# Patient Record
Sex: FEMALE | Race: BLACK OR AFRICAN AMERICAN | Employment: OTHER | URBAN - METROPOLITAN AREA
[De-identification: names, ages, dates, MRNs, and addresses within clinical notes are randomized per-mention and may not be internally consistent; named-entity substitution may affect disease eponyms.]

---

## 2017-08-03 PROBLEM — R01.1 HEART MURMUR: Status: ACTIVE | Noted: 2017-08-03

## 2017-08-03 PROBLEM — E66.3 OVER WEIGHT: Status: ACTIVE | Noted: 2017-08-03

## 2018-06-08 ENCOUNTER — HOSPITAL ENCOUNTER (OUTPATIENT)
Age: 73
Discharge: HOME | End: 2018-06-08
Payer: MEDICARE

## 2018-06-08 LAB
ANION GAP SERPL CALCULATED.3IONS-SCNC: 11 MMOL/L (ref 7–16)
BASOPHILS ABSOLUTE: 0.03 E9/L (ref 0–0.2)
BASOPHILS RELATIVE PERCENT: 0.5 % (ref 0–2)
BUN BLDV-MCNC: 18 MG/DL (ref 8–23)
CALCIUM SERPL-MCNC: 9.3 MG/DL (ref 8.6–10.2)
CHLORIDE BLD-SCNC: 102 MMOL/L (ref 98–107)
CO2: 29 MMOL/L (ref 22–29)
CREAT SERPL-MCNC: 1.2 MG/DL (ref 0.5–1)
EOSINOPHILS ABSOLUTE: 0.1 E9/L (ref 0.05–0.5)
EOSINOPHILS RELATIVE PERCENT: 1.7 % (ref 0–6)
GFR AFRICAN AMERICAN: 53
GFR NON-AFRICAN AMERICAN: 53 ML/MIN/1.73
GLUCOSE BLD-MCNC: 98 MG/DL (ref 74–109)
HCT VFR BLD CALC: 36.3 % (ref 34–48)
HEMOGLOBIN: 11.9 G/DL (ref 11.5–15.5)
IMMATURE GRANULOCYTES #: 0.02 E9/L
IMMATURE GRANULOCYTES %: 0.3 % (ref 0–5)
LYMPHOCYTES ABSOLUTE: 3.01 E9/L (ref 1.5–4)
LYMPHOCYTES RELATIVE PERCENT: 52.3 % (ref 20–42)
MCH RBC QN AUTO: 26.4 PG (ref 26–35)
MCHC RBC AUTO-ENTMCNC: 32.8 % (ref 32–34.5)
MCV RBC AUTO: 80.7 FL (ref 80–99.9)
MONOCYTES ABSOLUTE: 0.39 E9/L (ref 0.1–0.95)
MONOCYTES RELATIVE PERCENT: 6.8 % (ref 2–12)
NEUTROPHILS ABSOLUTE: 2.2 E9/L (ref 1.8–7.3)
NEUTROPHILS RELATIVE PERCENT: 38.4 % (ref 43–80)
PDW BLD-RTO: 13.5 FL (ref 11.5–15)
PHOSPHORUS: 3.4 MG/DL (ref 2.5–4.5)
PLATELET # BLD: 164 E9/L (ref 130–450)
PMV BLD AUTO: 9.6 FL (ref 7–12)
POTASSIUM SERPL-SCNC: 3.8 MMOL/L (ref 3.5–5)
RBC # BLD: 4.5 E12/L (ref 3.5–5.5)
SODIUM BLD-SCNC: 142 MMOL/L (ref 132–146)
WBC # BLD: 5.8 E9/L (ref 4.5–11.5)

## 2018-06-08 PROCEDURE — 84100 ASSAY OF PHOSPHORUS: CPT

## 2018-06-08 PROCEDURE — 36415 COLL VENOUS BLD VENIPUNCTURE: CPT

## 2018-06-08 PROCEDURE — 85025 COMPLETE CBC W/AUTO DIFF WBC: CPT

## 2018-06-08 PROCEDURE — 80048 BASIC METABOLIC PNL TOTAL CA: CPT

## 2018-06-08 PROCEDURE — 82306 VITAMIN D 25 HYDROXY: CPT

## 2018-06-08 PROCEDURE — 83970 ASSAY OF PARATHORMONE: CPT

## 2018-06-09 LAB
PARATHYROID HORMONE INTACT: 50 PG/ML (ref 15–65)
VITAMIN D 25-HYDROXY: 76 NG/ML (ref 30–100)

## 2018-11-07 ENCOUNTER — APPOINTMENT (OUTPATIENT)
Dept: GENERAL RADIOLOGY | Age: 73
End: 2018-11-07
Payer: MEDICARE

## 2018-11-07 ENCOUNTER — HOSPITAL ENCOUNTER (EMERGENCY)
Age: 73
Discharge: HOME | End: 2018-11-08
Attending: EMERGENCY MEDICINE
Payer: MEDICARE

## 2018-11-07 VITALS
RESPIRATION RATE: 16 BRPM | TEMPERATURE: 98.9 F | HEART RATE: 74 BPM | DIASTOLIC BLOOD PRESSURE: 95 MMHG | HEIGHT: 65 IN | BODY MASS INDEX: 27.66 KG/M2 | OXYGEN SATURATION: 99 % | WEIGHT: 166 LBS | SYSTOLIC BLOOD PRESSURE: 178 MMHG

## 2018-11-07 DIAGNOSIS — M87.052 AVASCULAR NECROSIS OF BONE OF LEFT HIP (HCC): ICD-10-CM

## 2018-11-07 DIAGNOSIS — M87.052 AVASCULAR NECROSIS OF BONE OF HIP, LEFT (HCC): ICD-10-CM

## 2018-11-07 DIAGNOSIS — S82.142A CLOSED FRACTURE OF LEFT TIBIAL PLATEAU, INITIAL ENCOUNTER: Primary | ICD-10-CM

## 2018-11-07 PROCEDURE — 73562 X-RAY EXAM OF KNEE 3: CPT

## 2018-11-07 PROCEDURE — 72170 X-RAY EXAM OF PELVIS: CPT

## 2018-11-07 PROCEDURE — 71045 X-RAY EXAM CHEST 1 VIEW: CPT

## 2018-11-07 PROCEDURE — 99284 EMERGENCY DEPT VISIT MOD MDM: CPT

## 2018-11-07 PROCEDURE — 73030 X-RAY EXAM OF SHOULDER: CPT

## 2018-11-07 PROCEDURE — 6360000002 HC RX W HCPCS: Performed by: EMERGENCY MEDICINE

## 2018-11-07 PROCEDURE — 6370000000 HC RX 637 (ALT 250 FOR IP): Performed by: EMERGENCY MEDICINE

## 2018-11-07 RX ORDER — OXYCODONE HYDROCHLORIDE AND ACETAMINOPHEN 5; 325 MG/1; MG/1
2 TABLET ORAL ONCE
Status: COMPLETED | OUTPATIENT
Start: 2018-11-07 | End: 2018-11-07

## 2018-11-07 RX ORDER — TRAMADOL HYDROCHLORIDE 50 MG/1
50 TABLET ORAL ONCE
Status: COMPLETED | OUTPATIENT
Start: 2018-11-07 | End: 2018-11-07

## 2018-11-07 RX ORDER — ONDANSETRON 4 MG/1
8 TABLET, ORALLY DISINTEGRATING ORAL ONCE
Status: COMPLETED | OUTPATIENT
Start: 2018-11-07 | End: 2018-11-07

## 2018-11-07 RX ADMIN — TRAMADOL HYDROCHLORIDE 50 MG: 50 TABLET, FILM COATED ORAL at 20:24

## 2018-11-07 RX ADMIN — OXYCODONE AND ACETAMINOPHEN 2 TABLET: 5; 325 TABLET ORAL at 23:37

## 2018-11-07 RX ADMIN — ONDANSETRON 8 MG: 4 TABLET, ORALLY DISINTEGRATING ORAL at 20:24

## 2018-11-07 ASSESSMENT — PAIN DESCRIPTION - LOCATION: LOCATION: BACK;SHOULDER

## 2018-11-07 ASSESSMENT — PAIN DESCRIPTION - PAIN TYPE: TYPE: ACUTE PAIN

## 2018-11-07 ASSESSMENT — PAIN SCALES - GENERAL
PAINLEVEL_OUTOF10: 5

## 2018-11-07 ASSESSMENT — PAIN DESCRIPTION - ORIENTATION: ORIENTATION: RIGHT

## 2018-11-08 ASSESSMENT — PAIN DESCRIPTION - PAIN TYPE: TYPE: ACUTE PAIN

## 2018-11-08 ASSESSMENT — PAIN SCALES - GENERAL: PAINLEVEL_OUTOF10: 3

## 2018-11-08 ASSESSMENT — PAIN DESCRIPTION - ORIENTATION: ORIENTATION: LEFT

## 2018-11-08 ASSESSMENT — PAIN DESCRIPTION - LOCATION: LOCATION: FACE
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
## 2019-02-27 ENCOUNTER — HOSPITAL ENCOUNTER (OUTPATIENT)
Age: 74
Discharge: HOME | End: 2019-02-27
Payer: MEDICARE

## 2019-02-27 LAB
ALBUMIN SERPL-MCNC: 4.1 G/DL (ref 3.5–5.2)
ALP BLD-CCNC: 83 U/L (ref 35–104)
ALT SERPL-CCNC: 13 U/L (ref 0–32)
ANION GAP SERPL CALCULATED.3IONS-SCNC: 10 MMOL/L (ref 7–16)
AST SERPL-CCNC: 15 U/L (ref 0–31)
BASOPHILS ABSOLUTE: 0.03 E9/L (ref 0–0.2)
BASOPHILS RELATIVE PERCENT: 0.6 % (ref 0–2)
BILIRUB SERPL-MCNC: 0.4 MG/DL (ref 0–1.2)
BUN BLDV-MCNC: 18 MG/DL (ref 8–23)
CALCIUM SERPL-MCNC: 9.7 MG/DL (ref 8.6–10.2)
CHLORIDE BLD-SCNC: 102 MMOL/L (ref 98–107)
CO2: 30 MMOL/L (ref 22–29)
CREAT SERPL-MCNC: 1.4 MG/DL (ref 0.5–1)
EOSINOPHILS ABSOLUTE: 0.1 E9/L (ref 0.05–0.5)
EOSINOPHILS RELATIVE PERCENT: 1.9 % (ref 0–6)
GFR AFRICAN AMERICAN: 45
GFR NON-AFRICAN AMERICAN: 45 ML/MIN/1.73
GLUCOSE BLD-MCNC: 121 MG/DL (ref 74–99)
HCT VFR BLD CALC: 38.2 % (ref 34–48)
HEMOGLOBIN: 12.1 G/DL (ref 11.5–15.5)
IMMATURE GRANULOCYTES #: 0.01 E9/L
IMMATURE GRANULOCYTES %: 0.2 % (ref 0–5)
LYMPHOCYTES ABSOLUTE: 2.85 E9/L (ref 1.5–4)
LYMPHOCYTES RELATIVE PERCENT: 54.7 % (ref 20–42)
MAGNESIUM: 2 MG/DL (ref 1.6–2.6)
MCH RBC QN AUTO: 26.2 PG (ref 26–35)
MCHC RBC AUTO-ENTMCNC: 31.7 % (ref 32–34.5)
MCV RBC AUTO: 82.9 FL (ref 80–99.9)
MONOCYTES ABSOLUTE: 0.31 E9/L (ref 0.1–0.95)
MONOCYTES RELATIVE PERCENT: 6 % (ref 2–12)
NEUTROPHILS ABSOLUTE: 1.91 E9/L (ref 1.8–7.3)
NEUTROPHILS RELATIVE PERCENT: 36.6 % (ref 43–80)
PARATHYROID HORMONE INTACT: 43 PG/ML (ref 15–65)
PDW BLD-RTO: 13.4 FL (ref 11.5–15)
PHOSPHORUS: 4.3 MG/DL (ref 2.5–4.5)
PLATELET # BLD: 168 E9/L (ref 130–450)
PMV BLD AUTO: 9.2 FL (ref 7–12)
POTASSIUM SERPL-SCNC: 3.9 MMOL/L (ref 3.5–5)
RBC # BLD: 4.61 E12/L (ref 3.5–5.5)
SODIUM BLD-SCNC: 142 MMOL/L (ref 132–146)
TOTAL PROTEIN: 6.8 G/DL (ref 6.4–8.3)
VITAMIN D 25-HYDROXY: 80 NG/ML (ref 30–100)
WBC # BLD: 5.2 E9/L (ref 4.5–11.5)

## 2019-02-27 PROCEDURE — 36415 COLL VENOUS BLD VENIPUNCTURE: CPT

## 2019-02-27 PROCEDURE — 83970 ASSAY OF PARATHORMONE: CPT

## 2019-02-27 PROCEDURE — 80053 COMPREHEN METABOLIC PANEL: CPT

## 2019-02-27 PROCEDURE — 83735 ASSAY OF MAGNESIUM: CPT

## 2019-02-27 PROCEDURE — 84100 ASSAY OF PHOSPHORUS: CPT

## 2019-02-27 PROCEDURE — 82306 VITAMIN D 25 HYDROXY: CPT

## 2019-02-27 PROCEDURE — 85025 COMPLETE CBC W/AUTO DIFF WBC: CPT

## 2019-04-03 ENCOUNTER — HOSPITAL ENCOUNTER (OUTPATIENT)
Age: 74
Discharge: HOME | End: 2019-04-03
Payer: MEDICARE

## 2019-04-03 LAB
ANION GAP SERPL CALCULATED.3IONS-SCNC: 13 MMOL/L (ref 7–16)
BASOPHILS ABSOLUTE: 0.02 E9/L (ref 0–0.2)
BASOPHILS RELATIVE PERCENT: 0.4 % (ref 0–2)
BUN BLDV-MCNC: 33 MG/DL (ref 8–23)
CALCIUM SERPL-MCNC: 9.1 MG/DL (ref 8.6–10.2)
CHLORIDE BLD-SCNC: 103 MMOL/L (ref 98–107)
CO2: 28 MMOL/L (ref 22–29)
CREAT SERPL-MCNC: 1.7 MG/DL (ref 0.5–1)
EOSINOPHILS ABSOLUTE: 0.11 E9/L (ref 0.05–0.5)
EOSINOPHILS RELATIVE PERCENT: 2.1 % (ref 0–6)
GFR AFRICAN AMERICAN: 36
GFR NON-AFRICAN AMERICAN: 36 ML/MIN/1.73
GLUCOSE BLD-MCNC: 109 MG/DL (ref 74–99)
HCT VFR BLD CALC: 36 % (ref 34–48)
HEMOGLOBIN: 11.3 G/DL (ref 11.5–15.5)
IMMATURE GRANULOCYTES #: 0.01 E9/L
IMMATURE GRANULOCYTES %: 0.2 % (ref 0–5)
LYMPHOCYTES ABSOLUTE: 2.41 E9/L (ref 1.5–4)
LYMPHOCYTES RELATIVE PERCENT: 46.9 % (ref 20–42)
MCH RBC QN AUTO: 26 PG (ref 26–35)
MCHC RBC AUTO-ENTMCNC: 31.4 % (ref 32–34.5)
MCV RBC AUTO: 82.8 FL (ref 80–99.9)
MONOCYTES ABSOLUTE: 0.37 E9/L (ref 0.1–0.95)
MONOCYTES RELATIVE PERCENT: 7.2 % (ref 2–12)
NEUTROPHILS ABSOLUTE: 2.22 E9/L (ref 1.8–7.3)
NEUTROPHILS RELATIVE PERCENT: 43.2 % (ref 43–80)
PDW BLD-RTO: 13.5 FL (ref 11.5–15)
PHOSPHORUS: 3.9 MG/DL (ref 2.5–4.5)
PLATELET # BLD: 163 E9/L (ref 130–450)
PMV BLD AUTO: 9.6 FL (ref 7–12)
POTASSIUM SERPL-SCNC: 3.9 MMOL/L (ref 3.5–5)
RBC # BLD: 4.35 E12/L (ref 3.5–5.5)
SODIUM BLD-SCNC: 144 MMOL/L (ref 132–146)
WBC # BLD: 5.1 E9/L (ref 4.5–11.5)

## 2019-04-03 PROCEDURE — 84100 ASSAY OF PHOSPHORUS: CPT

## 2019-04-03 PROCEDURE — 85025 COMPLETE CBC W/AUTO DIFF WBC: CPT

## 2019-04-03 PROCEDURE — 80048 BASIC METABOLIC PNL TOTAL CA: CPT

## 2019-04-03 PROCEDURE — 36415 COLL VENOUS BLD VENIPUNCTURE: CPT

## 2019-05-02 ENCOUNTER — HOSPITAL ENCOUNTER (OUTPATIENT)
Age: 74
Discharge: HOME | End: 2019-05-02
Payer: MEDICARE

## 2019-05-02 LAB
ANION GAP SERPL CALCULATED.3IONS-SCNC: 12 MMOL/L (ref 7–16)
BASOPHILS ABSOLUTE: 0.02 E9/L (ref 0–0.2)
BASOPHILS RELATIVE PERCENT: 0.4 % (ref 0–2)
BUN BLDV-MCNC: 19 MG/DL (ref 8–23)
CALCIUM SERPL-MCNC: 9.3 MG/DL (ref 8.6–10.2)
CHLORIDE BLD-SCNC: 105 MMOL/L (ref 98–107)
CO2: 29 MMOL/L (ref 22–29)
CREAT SERPL-MCNC: 1.5 MG/DL (ref 0.5–1)
EOSINOPHILS ABSOLUTE: 0.14 E9/L (ref 0.05–0.5)
EOSINOPHILS RELATIVE PERCENT: 2.6 % (ref 0–6)
GFR AFRICAN AMERICAN: 41
GFR NON-AFRICAN AMERICAN: 41 ML/MIN/1.73
GLUCOSE BLD-MCNC: 109 MG/DL (ref 74–99)
HCT VFR BLD CALC: 35.9 % (ref 34–48)
HEMOGLOBIN: 11.4 G/DL (ref 11.5–15.5)
IMMATURE GRANULOCYTES #: 0.01 E9/L
IMMATURE GRANULOCYTES %: 0.2 % (ref 0–5)
LYMPHOCYTES ABSOLUTE: 2.22 E9/L (ref 1.5–4)
LYMPHOCYTES RELATIVE PERCENT: 41 % (ref 20–42)
MCH RBC QN AUTO: 26.4 PG (ref 26–35)
MCHC RBC AUTO-ENTMCNC: 31.8 % (ref 32–34.5)
MCV RBC AUTO: 83.1 FL (ref 80–99.9)
MONOCYTES ABSOLUTE: 0.44 E9/L (ref 0.1–0.95)
MONOCYTES RELATIVE PERCENT: 8.1 % (ref 2–12)
NEUTROPHILS ABSOLUTE: 2.59 E9/L (ref 1.8–7.3)
NEUTROPHILS RELATIVE PERCENT: 47.7 % (ref 43–80)
PDW BLD-RTO: 13.9 FL (ref 11.5–15)
PHOSPHORUS: 3.2 MG/DL (ref 2.5–4.5)
PLATELET # BLD: 153 E9/L (ref 130–450)
PMV BLD AUTO: 9.7 FL (ref 7–12)
POTASSIUM SERPL-SCNC: 4.1 MMOL/L (ref 3.5–5)
RBC # BLD: 4.32 E12/L (ref 3.5–5.5)
SODIUM BLD-SCNC: 146 MMOL/L (ref 132–146)
WBC # BLD: 5.4 E9/L (ref 4.5–11.5)

## 2019-05-02 PROCEDURE — 80048 BASIC METABOLIC PNL TOTAL CA: CPT

## 2019-05-02 PROCEDURE — 85025 COMPLETE CBC W/AUTO DIFF WBC: CPT

## 2019-05-02 PROCEDURE — 84100 ASSAY OF PHOSPHORUS: CPT

## 2019-05-02 PROCEDURE — 36415 COLL VENOUS BLD VENIPUNCTURE: CPT

## 2019-06-10 ENCOUNTER — HOSPITAL ENCOUNTER (OUTPATIENT)
Age: 74
Discharge: HOME | End: 2019-06-10
Payer: MEDICARE

## 2019-06-10 LAB
ANION GAP SERPL CALCULATED.3IONS-SCNC: 10 MMOL/L (ref 7–16)
BASOPHILS ABSOLUTE: 0.03 E9/L (ref 0–0.2)
BASOPHILS RELATIVE PERCENT: 0.6 % (ref 0–2)
BUN BLDV-MCNC: 28 MG/DL (ref 8–23)
CALCIUM SERPL-MCNC: 9 MG/DL (ref 8.6–10.2)
CHLORIDE BLD-SCNC: 104 MMOL/L (ref 98–107)
CO2: 29 MMOL/L (ref 22–29)
CREAT SERPL-MCNC: 1.3 MG/DL (ref 0.5–1)
EOSINOPHILS ABSOLUTE: 0.13 E9/L (ref 0.05–0.5)
EOSINOPHILS RELATIVE PERCENT: 2.8 % (ref 0–6)
GFR AFRICAN AMERICAN: 49
GFR NON-AFRICAN AMERICAN: 49 ML/MIN/1.73
GLUCOSE BLD-MCNC: 139 MG/DL (ref 74–99)
HCT VFR BLD CALC: 35.8 % (ref 34–48)
HEMOGLOBIN: 11.4 G/DL (ref 11.5–15.5)
IMMATURE GRANULOCYTES #: 0.01 E9/L
IMMATURE GRANULOCYTES %: 0.2 % (ref 0–5)
LYMPHOCYTES ABSOLUTE: 2.33 E9/L (ref 1.5–4)
LYMPHOCYTES RELATIVE PERCENT: 49.5 % (ref 20–42)
MCH RBC QN AUTO: 26.2 PG (ref 26–35)
MCHC RBC AUTO-ENTMCNC: 31.8 % (ref 32–34.5)
MCV RBC AUTO: 82.3 FL (ref 80–99.9)
MONOCYTES ABSOLUTE: 0.35 E9/L (ref 0.1–0.95)
MONOCYTES RELATIVE PERCENT: 7.4 % (ref 2–12)
NEUTROPHILS ABSOLUTE: 1.86 E9/L (ref 1.8–7.3)
NEUTROPHILS RELATIVE PERCENT: 39.5 % (ref 43–80)
PDW BLD-RTO: 14.2 FL (ref 11.5–15)
PHOSPHORUS: 3 MG/DL (ref 2.5–4.5)
PLATELET # BLD: 157 E9/L (ref 130–450)
PMV BLD AUTO: 9.6 FL (ref 7–12)
POTASSIUM SERPL-SCNC: 3.9 MMOL/L (ref 3.5–5)
RBC # BLD: 4.35 E12/L (ref 3.5–5.5)
SODIUM BLD-SCNC: 143 MMOL/L (ref 132–146)
WBC # BLD: 4.7 E9/L (ref 4.5–11.5)

## 2019-06-10 PROCEDURE — 85025 COMPLETE CBC W/AUTO DIFF WBC: CPT

## 2019-06-10 PROCEDURE — 80048 BASIC METABOLIC PNL TOTAL CA: CPT

## 2019-06-10 PROCEDURE — 36415 COLL VENOUS BLD VENIPUNCTURE: CPT

## 2019-06-10 PROCEDURE — 84100 ASSAY OF PHOSPHORUS: CPT

## 2019-07-08 ENCOUNTER — HOSPITAL ENCOUNTER (OUTPATIENT)
Age: 74
Discharge: HOME | End: 2019-07-08
Payer: MEDICARE

## 2019-07-08 LAB
ALBUMIN SERPL-MCNC: 4.3 G/DL (ref 3.5–5.2)
ALP BLD-CCNC: 85 U/L (ref 35–104)
ALT SERPL-CCNC: 12 U/L (ref 0–32)
ANION GAP SERPL CALCULATED.3IONS-SCNC: 11 MMOL/L (ref 7–16)
AST SERPL-CCNC: 15 U/L (ref 0–31)
BASOPHILS ABSOLUTE: 0.02 E9/L (ref 0–0.2)
BASOPHILS RELATIVE PERCENT: 0.4 % (ref 0–2)
BILIRUB SERPL-MCNC: 0.3 MG/DL (ref 0–1.2)
BUN BLDV-MCNC: 16 MG/DL (ref 8–23)
CALCIUM SERPL-MCNC: 9.2 MG/DL (ref 8.6–10.2)
CHLORIDE BLD-SCNC: 103 MMOL/L (ref 98–107)
CHOLESTEROL, TOTAL: 192 MG/DL (ref 0–199)
CO2: 30 MMOL/L (ref 22–29)
CREAT SERPL-MCNC: 1.4 MG/DL (ref 0.5–1)
EOSINOPHILS ABSOLUTE: 0.16 E9/L (ref 0.05–0.5)
EOSINOPHILS RELATIVE PERCENT: 3.3 % (ref 0–6)
GFR AFRICAN AMERICAN: 45
GFR NON-AFRICAN AMERICAN: 45 ML/MIN/1.73
GLUCOSE BLD-MCNC: 130 MG/DL (ref 74–99)
HCT VFR BLD CALC: 36.8 % (ref 34–48)
HDLC SERPL-MCNC: 34 MG/DL
HEMOGLOBIN: 11.7 G/DL (ref 11.5–15.5)
IMMATURE GRANULOCYTES #: 0.02 E9/L
IMMATURE GRANULOCYTES %: 0.4 % (ref 0–5)
LDL CHOLESTEROL CALCULATED: 124 MG/DL (ref 0–99)
LYMPHOCYTES ABSOLUTE: 1.8 E9/L (ref 1.5–4)
LYMPHOCYTES RELATIVE PERCENT: 37 % (ref 20–42)
MCH RBC QN AUTO: 26.5 PG (ref 26–35)
MCHC RBC AUTO-ENTMCNC: 31.8 % (ref 32–34.5)
MCV RBC AUTO: 83.3 FL (ref 80–99.9)
MONOCYTES ABSOLUTE: 0.34 E9/L (ref 0.1–0.95)
MONOCYTES RELATIVE PERCENT: 7 % (ref 2–12)
NEUTROPHILS ABSOLUTE: 2.53 E9/L (ref 1.8–7.3)
NEUTROPHILS RELATIVE PERCENT: 51.9 % (ref 43–80)
PDW BLD-RTO: 13.5 FL (ref 11.5–15)
PHOSPHORUS: 3.3 MG/DL (ref 2.5–4.5)
PLATELET # BLD: 157 E9/L (ref 130–450)
PMV BLD AUTO: 9.2 FL (ref 7–12)
POTASSIUM SERPL-SCNC: 4.1 MMOL/L (ref 3.5–5)
RBC # BLD: 4.42 E12/L (ref 3.5–5.5)
SODIUM BLD-SCNC: 144 MMOL/L (ref 132–146)
TOTAL PROTEIN: 6.9 G/DL (ref 6.4–8.3)
TRIGL SERPL-MCNC: 168 MG/DL (ref 0–149)
VLDLC SERPL CALC-MCNC: 34 MG/DL
WBC # BLD: 4.9 E9/L (ref 4.5–11.5)

## 2019-07-08 PROCEDURE — 85025 COMPLETE CBC W/AUTO DIFF WBC: CPT

## 2019-07-08 PROCEDURE — 36415 COLL VENOUS BLD VENIPUNCTURE: CPT

## 2019-07-08 PROCEDURE — 84100 ASSAY OF PHOSPHORUS: CPT

## 2019-07-08 PROCEDURE — 80061 LIPID PANEL: CPT

## 2019-07-08 PROCEDURE — 80053 COMPREHEN METABOLIC PANEL: CPT

## 2019-09-05 ENCOUNTER — HOSPITAL ENCOUNTER (OUTPATIENT)
Age: 74
Discharge: HOME | End: 2019-09-05
Payer: MEDICARE

## 2019-09-05 LAB
ANION GAP SERPL CALCULATED.3IONS-SCNC: 12 MMOL/L (ref 7–16)
BASOPHILS ABSOLUTE: 0.03 E9/L (ref 0–0.2)
BASOPHILS RELATIVE PERCENT: 0.5 % (ref 0–2)
BUN BLDV-MCNC: 25 MG/DL (ref 8–23)
CALCIUM SERPL-MCNC: 9.8 MG/DL (ref 8.6–10.2)
CHLORIDE BLD-SCNC: 102 MMOL/L (ref 98–107)
CO2: 30 MMOL/L (ref 22–29)
CREAT SERPL-MCNC: 1.5 MG/DL (ref 0.5–1)
EOSINOPHILS ABSOLUTE: 0.15 E9/L (ref 0.05–0.5)
EOSINOPHILS RELATIVE PERCENT: 2.6 % (ref 0–6)
GFR AFRICAN AMERICAN: 41
GFR NON-AFRICAN AMERICAN: 41 ML/MIN/1.73
GLUCOSE BLD-MCNC: 117 MG/DL (ref 74–99)
HCT VFR BLD CALC: 40.1 % (ref 34–48)
HEMOGLOBIN: 12.7 G/DL (ref 11.5–15.5)
IMMATURE GRANULOCYTES #: 0.01 E9/L
IMMATURE GRANULOCYTES %: 0.2 % (ref 0–5)
LYMPHOCYTES ABSOLUTE: 2.69 E9/L (ref 1.5–4)
LYMPHOCYTES RELATIVE PERCENT: 47.3 % (ref 20–42)
MCH RBC QN AUTO: 26.1 PG (ref 26–35)
MCHC RBC AUTO-ENTMCNC: 31.7 % (ref 32–34.5)
MCV RBC AUTO: 82.5 FL (ref 80–99.9)
MONOCYTES ABSOLUTE: 0.39 E9/L (ref 0.1–0.95)
MONOCYTES RELATIVE PERCENT: 6.9 % (ref 2–12)
NEUTROPHILS ABSOLUTE: 2.42 E9/L (ref 1.8–7.3)
NEUTROPHILS RELATIVE PERCENT: 42.5 % (ref 43–80)
PDW BLD-RTO: 13.3 FL (ref 11.5–15)
PHOSPHORUS: 3.3 MG/DL (ref 2.5–4.5)
PLATELET # BLD: 164 E9/L (ref 130–450)
PMV BLD AUTO: 9.3 FL (ref 7–12)
POTASSIUM SERPL-SCNC: 3.7 MMOL/L (ref 3.5–5)
RBC # BLD: 4.86 E12/L (ref 3.5–5.5)
SODIUM BLD-SCNC: 144 MMOL/L (ref 132–146)
WBC # BLD: 5.7 E9/L (ref 4.5–11.5)

## 2019-09-05 PROCEDURE — 80048 BASIC METABOLIC PNL TOTAL CA: CPT

## 2019-09-05 PROCEDURE — 84100 ASSAY OF PHOSPHORUS: CPT

## 2019-09-05 PROCEDURE — 36415 COLL VENOUS BLD VENIPUNCTURE: CPT

## 2019-09-05 PROCEDURE — 85025 COMPLETE CBC W/AUTO DIFF WBC: CPT

## 2019-11-15 ENCOUNTER — HOSPITAL ENCOUNTER (OUTPATIENT)
Age: 74
Discharge: HOME | End: 2019-11-15
Payer: MEDICARE

## 2019-11-15 LAB
ANION GAP SERPL CALCULATED.3IONS-SCNC: 8 MMOL/L (ref 7–16)
BASOPHILS ABSOLUTE: 0.02 E9/L (ref 0–0.2)
BASOPHILS RELATIVE PERCENT: 0.3 % (ref 0–2)
BUN BLDV-MCNC: 20 MG/DL (ref 8–23)
CALCIUM SERPL-MCNC: 9.2 MG/DL (ref 8.6–10.2)
CHLORIDE BLD-SCNC: 102 MMOL/L (ref 98–107)
CO2: 31 MMOL/L (ref 22–29)
CREAT SERPL-MCNC: 1.4 MG/DL (ref 0.5–1)
EOSINOPHILS ABSOLUTE: 0.16 E9/L (ref 0.05–0.5)
EOSINOPHILS RELATIVE PERCENT: 2.5 % (ref 0–6)
GFR AFRICAN AMERICAN: 44
GFR NON-AFRICAN AMERICAN: 44 ML/MIN/1.73
GLUCOSE BLD-MCNC: 106 MG/DL (ref 74–99)
HCT VFR BLD CALC: 38.6 % (ref 34–48)
HEMOGLOBIN: 12.2 G/DL (ref 11.5–15.5)
IMMATURE GRANULOCYTES #: 0.02 E9/L
IMMATURE GRANULOCYTES %: 0.3 % (ref 0–5)
LYMPHOCYTES ABSOLUTE: 2.83 E9/L (ref 1.5–4)
LYMPHOCYTES RELATIVE PERCENT: 45.1 % (ref 20–42)
MCH RBC QN AUTO: 26.2 PG (ref 26–35)
MCHC RBC AUTO-ENTMCNC: 31.6 % (ref 32–34.5)
MCV RBC AUTO: 82.8 FL (ref 80–99.9)
MONOCYTES ABSOLUTE: 0.42 E9/L (ref 0.1–0.95)
MONOCYTES RELATIVE PERCENT: 6.7 % (ref 2–12)
NEUTROPHILS ABSOLUTE: 2.83 E9/L (ref 1.8–7.3)
NEUTROPHILS RELATIVE PERCENT: 45.1 % (ref 43–80)
PDW BLD-RTO: 14.8 FL (ref 11.5–15)
PHOSPHORUS: 3 MG/DL (ref 2.5–4.5)
PLATELET # BLD: 187 E9/L (ref 130–450)
PMV BLD AUTO: 9.5 FL (ref 7–12)
POTASSIUM SERPL-SCNC: 4.4 MMOL/L (ref 3.5–5)
RBC # BLD: 4.66 E12/L (ref 3.5–5.5)
SODIUM BLD-SCNC: 141 MMOL/L (ref 132–146)
WBC # BLD: 6.3 E9/L (ref 4.5–11.5)

## 2019-11-15 PROCEDURE — 36415 COLL VENOUS BLD VENIPUNCTURE: CPT

## 2019-11-15 PROCEDURE — 80048 BASIC METABOLIC PNL TOTAL CA: CPT

## 2019-11-15 PROCEDURE — 84100 ASSAY OF PHOSPHORUS: CPT

## 2019-11-15 PROCEDURE — 85025 COMPLETE CBC W/AUTO DIFF WBC: CPT

## 2020-01-30 ENCOUNTER — HOSPITAL ENCOUNTER (OUTPATIENT)
Age: 75
Discharge: HOME | End: 2020-01-30
Payer: MEDICARE

## 2020-01-30 LAB
ANION GAP SERPL CALCULATED.3IONS-SCNC: 12 MMOL/L (ref 7–16)
BASOPHILS ABSOLUTE: 0.03 E9/L (ref 0–0.2)
BASOPHILS RELATIVE PERCENT: 0.6 % (ref 0–2)
BUN BLDV-MCNC: 23 MG/DL (ref 8–23)
CALCIUM SERPL-MCNC: 9.5 MG/DL (ref 8.6–10.2)
CHLORIDE BLD-SCNC: 104 MMOL/L (ref 98–107)
CO2: 29 MMOL/L (ref 22–29)
CREAT SERPL-MCNC: 1.4 MG/DL (ref 0.5–1)
EOSINOPHILS ABSOLUTE: 0.13 E9/L (ref 0.05–0.5)
EOSINOPHILS RELATIVE PERCENT: 2.7 % (ref 0–6)
GFR AFRICAN AMERICAN: 44
GFR NON-AFRICAN AMERICAN: 44 ML/MIN/1.73
GLUCOSE BLD-MCNC: 123 MG/DL (ref 74–99)
HCT VFR BLD CALC: 38.7 % (ref 34–48)
HEMOGLOBIN: 12.4 G/DL (ref 11.5–15.5)
IMMATURE GRANULOCYTES #: 0.01 E9/L
IMMATURE GRANULOCYTES %: 0.2 % (ref 0–5)
LYMPHOCYTES ABSOLUTE: 2.48 E9/L (ref 1.5–4)
LYMPHOCYTES RELATIVE PERCENT: 51.5 % (ref 20–42)
MCH RBC QN AUTO: 26.6 PG (ref 26–35)
MCHC RBC AUTO-ENTMCNC: 32 % (ref 32–34.5)
MCV RBC AUTO: 82.9 FL (ref 80–99.9)
MONOCYTES ABSOLUTE: 0.37 E9/L (ref 0.1–0.95)
MONOCYTES RELATIVE PERCENT: 7.7 % (ref 2–12)
NEUTROPHILS ABSOLUTE: 1.8 E9/L (ref 1.8–7.3)
NEUTROPHILS RELATIVE PERCENT: 37.3 % (ref 43–80)
PDW BLD-RTO: 13.7 FL (ref 11.5–15)
PHOSPHORUS: 2.7 MG/DL (ref 2.5–4.5)
PLATELET # BLD: 166 E9/L (ref 130–450)
PMV BLD AUTO: 9.4 FL (ref 7–12)
POTASSIUM SERPL-SCNC: 3.7 MMOL/L (ref 3.5–5)
RBC # BLD: 4.67 E12/L (ref 3.5–5.5)
SODIUM BLD-SCNC: 145 MMOL/L (ref 132–146)
WBC # BLD: 4.8 E9/L (ref 4.5–11.5)

## 2020-01-30 PROCEDURE — 84100 ASSAY OF PHOSPHORUS: CPT

## 2020-01-30 PROCEDURE — 36415 COLL VENOUS BLD VENIPUNCTURE: CPT

## 2020-01-30 PROCEDURE — 80048 BASIC METABOLIC PNL TOTAL CA: CPT

## 2020-01-30 PROCEDURE — 85025 COMPLETE CBC W/AUTO DIFF WBC: CPT

## 2020-06-09 ENCOUNTER — HOSPITAL ENCOUNTER (OUTPATIENT)
Age: 75
Discharge: HOME | End: 2020-06-09
Payer: MEDICARE

## 2020-06-09 LAB
ANION GAP SERPL CALCULATED.3IONS-SCNC: 14 MMOL/L (ref 7–16)
BASOPHILS ABSOLUTE: 0.03 E9/L (ref 0–0.2)
BASOPHILS RELATIVE PERCENT: 0.4 % (ref 0–2)
BUN BLDV-MCNC: 31 MG/DL (ref 8–23)
CALCIUM SERPL-MCNC: 9.6 MG/DL (ref 8.6–10.2)
CHLORIDE BLD-SCNC: 101 MMOL/L (ref 98–107)
CHOLESTEROL, TOTAL: 199 MG/DL (ref 0–199)
CO2: 27 MMOL/L (ref 22–29)
CREAT SERPL-MCNC: 1.5 MG/DL (ref 0.5–1)
EOSINOPHILS ABSOLUTE: 0.07 E9/L (ref 0.05–0.5)
EOSINOPHILS RELATIVE PERCENT: 0.8 % (ref 0–6)
GFR AFRICAN AMERICAN: 41
GFR NON-AFRICAN AMERICAN: 41 ML/MIN/1.73
GLUCOSE BLD-MCNC: 134 MG/DL (ref 74–99)
HCT VFR BLD CALC: 40.6 % (ref 34–48)
HDLC SERPL-MCNC: 45 MG/DL
HEMOGLOBIN: 13.4 G/DL (ref 11.5–15.5)
IMMATURE GRANULOCYTES #: 0.05 E9/L
IMMATURE GRANULOCYTES %: 0.6 % (ref 0–5)
LDL CHOLESTEROL CALCULATED: 134 MG/DL (ref 0–99)
LYMPHOCYTES ABSOLUTE: 2.5 E9/L (ref 1.5–4)
LYMPHOCYTES RELATIVE PERCENT: 29.4 % (ref 20–42)
MCH RBC QN AUTO: 27 PG (ref 26–35)
MCHC RBC AUTO-ENTMCNC: 33 % (ref 32–34.5)
MCV RBC AUTO: 81.7 FL (ref 80–99.9)
MONOCYTES ABSOLUTE: 0.64 E9/L (ref 0.1–0.95)
MONOCYTES RELATIVE PERCENT: 7.5 % (ref 2–12)
NEUTROPHILS ABSOLUTE: 5.2 E9/L (ref 1.8–7.3)
NEUTROPHILS RELATIVE PERCENT: 61.3 % (ref 43–80)
PDW BLD-RTO: 14.4 FL (ref 11.5–15)
PHOSPHORUS: 3.4 MG/DL (ref 2.5–4.5)
PLATELET # BLD: 184 E9/L (ref 130–450)
PMV BLD AUTO: 9.9 FL (ref 7–12)
POTASSIUM SERPL-SCNC: 4 MMOL/L (ref 3.5–5)
RBC # BLD: 4.97 E12/L (ref 3.5–5.5)
SODIUM BLD-SCNC: 142 MMOL/L (ref 132–146)
TRIGL SERPL-MCNC: 98 MG/DL (ref 0–149)
VLDLC SERPL CALC-MCNC: 20 MG/DL
WBC # BLD: 8.5 E9/L (ref 4.5–11.5)

## 2020-06-09 PROCEDURE — 83970 ASSAY OF PARATHORMONE: CPT

## 2020-06-09 PROCEDURE — 85025 COMPLETE CBC W/AUTO DIFF WBC: CPT

## 2020-06-09 PROCEDURE — 82306 VITAMIN D 25 HYDROXY: CPT

## 2020-06-09 PROCEDURE — 80048 BASIC METABOLIC PNL TOTAL CA: CPT

## 2020-06-09 PROCEDURE — 36415 COLL VENOUS BLD VENIPUNCTURE: CPT

## 2020-06-09 PROCEDURE — 80061 LIPID PANEL: CPT

## 2020-06-09 PROCEDURE — 84100 ASSAY OF PHOSPHORUS: CPT

## 2020-06-10 LAB
PARATHYROID HORMONE INTACT: 75 PG/ML (ref 15–65)
VITAMIN D 25-HYDROXY: 87 NG/ML (ref 30–100)

## 2020-07-16 ENCOUNTER — HOSPITAL ENCOUNTER (OUTPATIENT)
Age: 75
Discharge: HOME | End: 2020-07-16
Payer: MEDICARE

## 2020-07-16 LAB — HBA1C MFR BLD: 6.4 % (ref 4–5.6)

## 2020-07-16 PROCEDURE — 83036 HEMOGLOBIN GLYCOSYLATED A1C: CPT

## 2020-08-03 ENCOUNTER — APPOINTMENT (OUTPATIENT)
Dept: CT IMAGING | Age: 75
End: 2020-08-03
Payer: MEDICARE

## 2020-08-03 ENCOUNTER — HOSPITAL ENCOUNTER (EMERGENCY)
Age: 75
Discharge: ANOTHER HOSPITAL | End: 2020-08-03
Attending: EMERGENCY MEDICINE
Payer: MEDICARE

## 2020-08-03 VITALS
DIASTOLIC BLOOD PRESSURE: 64 MMHG | OXYGEN SATURATION: 97 % | HEIGHT: 65 IN | SYSTOLIC BLOOD PRESSURE: 114 MMHG | WEIGHT: 162 LBS | TEMPERATURE: 97.5 F | RESPIRATION RATE: 16 BRPM | HEART RATE: 66 BPM | BODY MASS INDEX: 26.99 KG/M2

## 2020-08-03 PROBLEM — N17.9 ACUTE RENAL FAILURE (HCC): Status: ACTIVE | Noted: 2020-08-03

## 2020-08-03 LAB
ALBUMIN SERPL-MCNC: 3.9 G/DL (ref 3.5–5.2)
ALP BLD-CCNC: 60 U/L (ref 35–104)
ALT SERPL-CCNC: 29 U/L (ref 0–32)
ANION GAP SERPL CALCULATED.3IONS-SCNC: 21 MMOL/L (ref 7–16)
AST SERPL-CCNC: 50 U/L (ref 0–31)
BACTERIA: NORMAL /HPF
BASOPHILS ABSOLUTE: 0.01 E9/L (ref 0–0.2)
BASOPHILS RELATIVE PERCENT: 0.2 % (ref 0–2)
BILIRUB SERPL-MCNC: 0.7 MG/DL (ref 0–1.2)
BILIRUBIN URINE: NEGATIVE
BLOOD, URINE: NEGATIVE
BUN BLDV-MCNC: 49 MG/DL (ref 8–23)
CALCIUM SERPL-MCNC: 8.8 MG/DL (ref 8.6–10.2)
CHLORIDE BLD-SCNC: 93 MMOL/L (ref 98–107)
CLARITY: CLEAR
CO2: 26 MMOL/L (ref 22–29)
COLOR: YELLOW
CREAT SERPL-MCNC: 5.4 MG/DL (ref 0.5–1)
EKG ATRIAL RATE: 59 BPM
EKG P AXIS: 38 DEGREES
EKG P-R INTERVAL: 158 MS
EKG Q-T INTERVAL: 452 MS
EKG QRS DURATION: 90 MS
EKG QTC CALCULATION (BAZETT): 447 MS
EKG R AXIS: 11 DEGREES
EKG T AXIS: 79 DEGREES
EKG VENTRICULAR RATE: 59 BPM
EOSINOPHILS ABSOLUTE: 0.07 E9/L (ref 0.05–0.5)
EOSINOPHILS RELATIVE PERCENT: 1.3 % (ref 0–6)
GFR AFRICAN AMERICAN: 9
GFR NON-AFRICAN AMERICAN: 9 ML/MIN/1.73
GLUCOSE BLD-MCNC: 126 MG/DL (ref 74–99)
GLUCOSE URINE: NEGATIVE MG/DL
HCT VFR BLD CALC: 37.4 % (ref 34–48)
HEMOGLOBIN: 12.4 G/DL (ref 11.5–15.5)
IMMATURE GRANULOCYTES #: 0.05 E9/L
IMMATURE GRANULOCYTES %: 1 % (ref 0–5)
INR BLD: 1.1
KETONES, URINE: NEGATIVE MG/DL
LACTIC ACID: 2.1 MMOL/L (ref 0.5–2.2)
LEUKOCYTE ESTERASE, URINE: NEGATIVE
LIPASE: 44 U/L (ref 13–60)
LYMPHOCYTES ABSOLUTE: 1.24 E9/L (ref 1.5–4)
LYMPHOCYTES RELATIVE PERCENT: 23.7 % (ref 20–42)
MAGNESIUM: 1.8 MG/DL (ref 1.6–2.6)
MCH RBC QN AUTO: 26.6 PG (ref 26–35)
MCHC RBC AUTO-ENTMCNC: 33.2 % (ref 32–34.5)
MCV RBC AUTO: 80.3 FL (ref 80–99.9)
MONOCYTES ABSOLUTE: 0.23 E9/L (ref 0.1–0.95)
MONOCYTES RELATIVE PERCENT: 4.4 % (ref 2–12)
NEUTROPHILS ABSOLUTE: 3.64 E9/L (ref 1.8–7.3)
NEUTROPHILS RELATIVE PERCENT: 69.4 % (ref 43–80)
NITRITE, URINE: NEGATIVE
PDW BLD-RTO: 13.7 FL (ref 11.5–15)
PH UA: 5 (ref 5–9)
PLATELET # BLD: 214 E9/L (ref 130–450)
PMV BLD AUTO: 10.7 FL (ref 7–12)
POTASSIUM REFLEX MAGNESIUM: 2.9 MMOL/L (ref 3.5–5)
PROTEIN UA: NORMAL MG/DL
PROTHROMBIN TIME: 11.7 SEC (ref 9.3–12.4)
RBC # BLD: 4.66 E12/L (ref 3.5–5.5)
RBC UA: NORMAL /HPF (ref 0–2)
SODIUM BLD-SCNC: 140 MMOL/L (ref 132–146)
SPECIFIC GRAVITY UA: 1.02 (ref 1–1.03)
TOTAL PROTEIN: 7.5 G/DL (ref 6.4–8.3)
UROBILINOGEN, URINE: 0.2 E.U./DL
WBC # BLD: 5.2 E9/L (ref 4.5–11.5)
WBC UA: NORMAL /HPF (ref 0–5)

## 2020-08-03 PROCEDURE — 96366 THER/PROPH/DIAG IV INF ADDON: CPT

## 2020-08-03 PROCEDURE — 74176 CT ABD & PELVIS W/O CONTRAST: CPT

## 2020-08-03 PROCEDURE — 80053 COMPREHEN METABOLIC PANEL: CPT

## 2020-08-03 PROCEDURE — 96368 THER/DIAG CONCURRENT INF: CPT

## 2020-08-03 PROCEDURE — 6360000002 HC RX W HCPCS: Performed by: EMERGENCY MEDICINE

## 2020-08-03 PROCEDURE — 84156 ASSAY OF PROTEIN URINE: CPT

## 2020-08-03 PROCEDURE — 99285 EMERGENCY DEPT VISIT HI MDM: CPT

## 2020-08-03 PROCEDURE — 96365 THER/PROPH/DIAG IV INF INIT: CPT

## 2020-08-03 PROCEDURE — 81001 URINALYSIS AUTO W/SCOPE: CPT

## 2020-08-03 PROCEDURE — 82570 ASSAY OF URINE CREATININE: CPT

## 2020-08-03 PROCEDURE — 83605 ASSAY OF LACTIC ACID: CPT

## 2020-08-03 PROCEDURE — 83690 ASSAY OF LIPASE: CPT

## 2020-08-03 PROCEDURE — 2500000003 HC RX 250 WO HCPCS: Performed by: EMERGENCY MEDICINE

## 2020-08-03 PROCEDURE — 93005 ELECTROCARDIOGRAM TRACING: CPT | Performed by: EMERGENCY MEDICINE

## 2020-08-03 PROCEDURE — 84300 ASSAY OF URINE SODIUM: CPT

## 2020-08-03 PROCEDURE — 2580000003 HC RX 258: Performed by: EMERGENCY MEDICINE

## 2020-08-03 PROCEDURE — 36415 COLL VENOUS BLD VENIPUNCTURE: CPT

## 2020-08-03 PROCEDURE — 82436 ASSAY OF URINE CHLORIDE: CPT

## 2020-08-03 PROCEDURE — 96361 HYDRATE IV INFUSION ADD-ON: CPT

## 2020-08-03 PROCEDURE — 83735 ASSAY OF MAGNESIUM: CPT

## 2020-08-03 PROCEDURE — 85610 PROTHROMBIN TIME: CPT

## 2020-08-03 PROCEDURE — 84133 ASSAY OF URINE POTASSIUM: CPT

## 2020-08-03 PROCEDURE — 85025 COMPLETE CBC W/AUTO DIFF WBC: CPT

## 2020-08-03 RX ORDER — 0.9 % SODIUM CHLORIDE 0.9 %
500 INTRAVENOUS SOLUTION INTRAVENOUS ONCE
Status: COMPLETED | OUTPATIENT
Start: 2020-08-03 | End: 2020-08-03

## 2020-08-03 RX ORDER — LATANOPROST 50 UG/ML
1 SOLUTION/ DROPS OPHTHALMIC NIGHTLY
COMMUNITY

## 2020-08-03 RX ORDER — POTASSIUM CHLORIDE 7.45 MG/ML
10 INJECTION INTRAVENOUS ONCE
Status: COMPLETED | OUTPATIENT
Start: 2020-08-03 | End: 2020-08-03

## 2020-08-03 RX ORDER — SODIUM CHLORIDE 9 MG/ML
1000 INJECTION, SOLUTION INTRAVENOUS CONTINUOUS
Status: DISCONTINUED | OUTPATIENT
Start: 2020-08-03 | End: 2020-08-04 | Stop reason: HOSPADM

## 2020-08-03 RX ADMIN — POTASSIUM CHLORIDE 10 MEQ: 10 INJECTION, SOLUTION INTRAVENOUS at 18:34

## 2020-08-03 RX ADMIN — POTASSIUM CHLORIDE 10 MEQ: 10 INJECTION, SOLUTION INTRAVENOUS at 17:02

## 2020-08-03 RX ADMIN — SODIUM CHLORIDE 1000 ML: 9 INJECTION, SOLUTION INTRAVENOUS at 17:02

## 2020-08-03 RX ADMIN — WATER 1 G: 1 INJECTION INTRAMUSCULAR; INTRAVENOUS; SUBCUTANEOUS at 17:35

## 2020-08-03 RX ADMIN — METRONIDAZOLE 500 MG: 500 INJECTION, SOLUTION INTRAVENOUS at 18:05

## 2020-08-03 RX ADMIN — SODIUM CHLORIDE 500 ML: 9 INJECTION, SOLUTION INTRAVENOUS at 15:17

## 2020-08-03 ASSESSMENT — PAIN SCALES - GENERAL: PAINLEVEL_OUTOF10: 0

## 2020-08-03 ASSESSMENT — ENCOUNTER SYMPTOMS
WHEEZING: 0
SHORTNESS OF BREATH: 0
RHINORRHEA: 0
PHOTOPHOBIA: 0
COLOR CHANGE: 0
BACK PAIN: 0
SINUS PAIN: 0
NAUSEA: 0
DIARRHEA: 1
ABDOMINAL PAIN: 0

## 2020-08-03 NOTE — ED PROVIDER NOTES
Shadia Mares is a 76 y.o. female presenting to the ED for diarrhea, beginning 3-4 days ago. The complaint has been intermittent, moderate in severity, and worsened by nothing. Pt is 77 yo f who notes over the psat 3-4 days has had diarrhea, body aches and just hasnt felt well, she notes decreased appatite. She denies fever or chills, she denies abdominal pain or cramping. She denies cough or cold sx, cp, sob, focal weakness, headache, falls. Pcp: dr Meli Gomes. Review of Systems:   Review of Systems   Constitutional: Negative for chills and fever. HENT: Negative for congestion, rhinorrhea and sinus pain. Eyes: Negative for photophobia and visual disturbance. Respiratory: Negative for shortness of breath and wheezing. Cardiovascular: Negative for chest pain and palpitations. Gastrointestinal: Positive for diarrhea. Negative for abdominal pain and nausea. Endocrine: Negative for polyuria. Genitourinary: Negative for difficulty urinating, dysuria and flank pain. Musculoskeletal: Negative for back pain and neck pain. Skin: Negative for color change and pallor. Allergic/Immunologic: Negative for immunocompromised state. Neurological: Negative for dizziness and headaches. Hematological: Negative for adenopathy. Does not bruise/bleed easily. Psychiatric/Behavioral: Negative for agitation.               --------------------------------------------- PAST HISTORY ---------------------------------------------  Past Medical History:  has a past medical history of Chronic renal disease, stage II, Hypertension, and Rhabdomyolysis. Past Surgical History:  has a past surgical history that includes Hysterectomy; Tubal ligation;  section; back surgery; Rotator cuff repair; knee surgery; and eye surgery. Social History:  reports that she has quit smoking. Her smoking use included cigarettes. She quit after 5.00 years of use.  She has never used smokeless tobacco. She reports current alcohol use. She reports that she does not use drugs. Family History: family history includes Cancer in her mother; Heart Disease in her mother. The patients home medications have been reviewed.     Allergies: Nalbuphine and Vistaril [hydroxyzine hcl]    -------------------------------------------------- RESULTS -------------------------------------------------  All laboratory and radiology results have been personally reviewed by myself   LABS:  Results for orders placed or performed during the hospital encounter of 08/03/20   CBC Auto Differential   Result Value Ref Range    WBC 5.2 4.5 - 11.5 E9/L    RBC 4.66 3.50 - 5.50 E12/L    Hemoglobin 12.4 11.5 - 15.5 g/dL    Hematocrit 37.4 34.0 - 48.0 %    MCV 80.3 80.0 - 99.9 fL    MCH 26.6 26.0 - 35.0 pg    MCHC 33.2 32.0 - 34.5 %    RDW 13.7 11.5 - 15.0 fL    Platelets 475 979 - 344 E9/L    MPV 10.7 7.0 - 12.0 fL    Neutrophils % 69.4 43.0 - 80.0 %    Immature Granulocytes % 1.0 0.0 - 5.0 %    Lymphocytes % 23.7 20.0 - 42.0 %    Monocytes % 4.4 2.0 - 12.0 %    Eosinophils % 1.3 0.0 - 6.0 %    Basophils % 0.2 0.0 - 2.0 %    Neutrophils Absolute 3.64 1.80 - 7.30 E9/L    Immature Granulocytes # 0.05 E9/L    Lymphocytes Absolute 1.24 (L) 1.50 - 4.00 E9/L    Monocytes Absolute 0.23 0.10 - 0.95 E9/L    Eosinophils Absolute 0.07 0.05 - 0.50 E9/L    Basophils Absolute 0.01 0.00 - 0.20 E9/L   Comprehensive Metabolic Panel w/ Reflex to MG   Result Value Ref Range    Sodium 140 132 - 146 mmol/L    Potassium reflex Magnesium 2.9 (L) 3.5 - 5.0 mmol/L    Chloride 93 (L) 98 - 107 mmol/L    CO2 26 22 - 29 mmol/L    Anion Gap 21 (H) 7 - 16 mmol/L    Glucose 126 (H) 74 - 99 mg/dL    BUN 49 (H) 8 - 23 mg/dL    CREATININE 5.4 (H) 0.5 - 1.0 mg/dL    GFR Non-African American 9 >=60 mL/min/1.73    GFR African American 9     Calcium 8.8 8.6 - 10.2 mg/dL    Total Protein 7.5 6.4 - 8.3 g/dL    Alb 3.9 3.5 - 5.2 g/dL    Total Bilirubin 0.7 0.0 - 1.2 mg/dL    Alkaline Phosphatase 60 35 - 104 U/L    ALT 29 0 - 32 U/L    AST 50 (H) 0 - 31 U/L   Lipase   Result Value Ref Range    Lipase 44 13 - 60 U/L   Lactic Acid, Plasma   Result Value Ref Range    Lactic Acid 2.1 0.5 - 2.2 mmol/L   Protime-INR   Result Value Ref Range    Protime 11.7 9.3 - 12.4 sec    INR 1.1    Magnesium   Result Value Ref Range    Magnesium 1.8 1.6 - 2.6 mg/dL   EKG 12 Lead   Result Value Ref Range    Ventricular Rate 59 BPM    Atrial Rate 59 BPM    P-R Interval 158 ms    QRS Duration 90 ms    Q-T Interval 452 ms    QTc Calculation (Bazett) 447 ms    P Axis 38 degrees    R Axis 11 degrees    T Axis 79 degrees       RADIOLOGY:  Interpreted by Radiologist.  CT ABDOMEN PELVIS WO CONTRAST   Final Result         1. No acute abnormality seen in the abdomen or the pelvis. 2. Bilateral renal lesions, including a 1.6 cm hyperdense lesion on   the left. 3. 1.6 and a hypodense lesion in the uncinate process of the pancreas. 4.. Postcontrast enhanced MRI of the abdomen is recommended to   evaluate both the renal and pancreatic lesions.                ------------------------- NURSING NOTES AND VITALS REVIEWED ---------------------------   The nursing notes within the ED encounter and vital signs as below have been reviewed. /68   Pulse 62   Temp 97.8 °F (36.6 °C) (Temporal)   Resp 14   Ht 5' 5\" (1.651 m)   Wt 162 lb (73.5 kg)   SpO2 98%   BMI 26.96 kg/m²   Oxygen Saturation Interpretation: Normal      ---------------------------------------------------PHYSICAL EXAM--------------------------------------    Physical Exam  Vitals signs reviewed. Constitutional:       General: She is not in acute distress. Appearance: Normal appearance. HENT:      Head: Normocephalic and atraumatic. Nose: Nose normal. No congestion. Mouth/Throat:      Mouth: Mucous membranes are moist.      Pharynx: Oropharynx is clear. Eyes:      Extraocular Movements: Extraocular movements intact.       Pupils: Pupils are equal, round, and reactive to light. Neck:      Musculoskeletal: No neck rigidity. Cardiovascular:      Rate and Rhythm: Normal rate and regular rhythm. Pulses: Normal pulses. Heart sounds: No murmur. Pulmonary:      Effort: Pulmonary effort is normal. No respiratory distress. Breath sounds: Normal breath sounds. No stridor. No wheezing, rhonchi or rales. Chest:      Chest wall: No tenderness. Abdominal:      General: Abdomen is flat. Bowel sounds are normal.      Tenderness: There is no abdominal tenderness. Musculoskeletal: Normal range of motion. General: No swelling. Skin:     General: Skin is warm. Capillary Refill: Capillary refill takes less than 2 seconds. Neurological:      General: No focal deficit present. Mental Status: She is alert and oriented to person, place, and time. Motor: No weakness. Psychiatric:         Mood and Affect: Mood normal.                 ------------------------------ ED COURSE/MEDICAL DECISION MAKING----------------------  Medications   0.9 % sodium chloride infusion (1,000 mLs Intravenous New Bag 8/3/20 1702)   potassium chloride 10 mEq/100 mL IVPB (Peripheral Line) (has no administration in time range)   cefTRIAXone (ROCEPHIN) 1 g in sterile water 10 mL IV syringe (has no administration in time range)   metronidazole (FLAGYL) 500 mg in NaCl 100 mL IVPB premix (has no administration in time range)   0.9 % sodium chloride bolus (0 mLs Intravenous Stopped 8/3/20 1613)   potassium chloride 10 mEq/100 mL IVPB (Peripheral Line) (10 mEq Intravenous New Bag 8/3/20 1702)     EKG: This EKG is signed and interpreted by me. Time:1505  Rate: 59  Rhythm: Sinus  Interpretation: nonspecific changes  Comparison: stable as compared to patient's most recent EKG 08/03/17      ED COURSE:       Medical Decision Making:    Pt presents with diarrhea found to have krystyna, renal and pancreatic masses, pt wants to be admitted to [facility].  Pt will be admitted there will need renal and hepatobiliary evals. Pt given ivf, iv kcl and abx  Risks and benefits were discussed with patient for All medications dispensed and given in department as well prescriptions prescribed for home, . The patient elected to take the medicine. Pt instructed on warning signs and precautions for medication side effects. The patient was given warning signs for when to seek medical attention. Counseled regarding todays diagnosis, including possible risks and complications,  especially if left uncontrolled. Counseling: The emergency provider has spoken with the patient and discussed todays results, in addition to providing specific details for the plan of care and counseling regarding the diagnosis and prognosis. Questions are answered at this time and they are agreeable with the plan.      --------------------------------- IMPRESSION AND DISPOSITION ---------------------------------    IMPRESSION  1. SAW (acute kidney injury) ([facility] [address]    2. Dehydration    3. Diarrhea, unspecified type    4. Kidney mass    5. Pancreatic mass        DISPOSITION  Disposition: Transfer to [facility] to [facility]  Patient condition is fair      NOTE: This report was transcribed using voice recognition software.  Every effort was made to ensure accuracy; however, inadvertent computerized transcription errors may be present       Ever DO Luis M  08/03/20 4751

## 2020-08-04 ENCOUNTER — HOSPITAL ENCOUNTER (INPATIENT)
Age: 75
LOS: 7 days | Discharge: HOME | DRG: 683 | End: 2020-08-11
Attending: INTERNAL MEDICINE | Admitting: INTERNAL MEDICINE
Payer: MEDICARE

## 2020-08-04 ENCOUNTER — APPOINTMENT (OUTPATIENT)
Dept: GENERAL RADIOLOGY | Age: 75
DRG: 683 | End: 2020-08-04
Attending: INTERNAL MEDICINE
Payer: MEDICARE

## 2020-08-04 ENCOUNTER — APPOINTMENT (OUTPATIENT)
Dept: ULTRASOUND IMAGING | Age: 75
DRG: 683 | End: 2020-08-04
Attending: INTERNAL MEDICINE
Payer: MEDICARE

## 2020-08-04 PROBLEM — R01.1 HEART MURMUR: Status: RESOLVED | Noted: 2017-08-03 | Resolved: 2020-08-04

## 2020-08-04 PROBLEM — E66.3 OVER WEIGHT: Status: RESOLVED | Noted: 2017-08-03 | Resolved: 2020-08-04

## 2020-08-04 PROBLEM — K52.9 ENTERITIS: Status: ACTIVE | Noted: 2020-08-04

## 2020-08-04 PROBLEM — E86.0 DEHYDRATION: Status: ACTIVE | Noted: 2020-08-04

## 2020-08-04 PROBLEM — M87.052 AVASCULAR NECROSIS OF FEMORAL HEAD, LEFT (HCC): Status: ACTIVE | Noted: 2020-08-04

## 2020-08-04 PROBLEM — N17.9 ACUTE RENAL FAILURE (HCC): Status: RESOLVED | Noted: 2020-08-03 | Resolved: 2020-08-04

## 2020-08-04 PROBLEM — K86.9 PANCREATIC LESION: Status: ACTIVE | Noted: 2020-08-04

## 2020-08-04 PROBLEM — R19.7 DIARRHEA: Status: ACTIVE | Noted: 2020-08-04

## 2020-08-04 LAB
ANISOCYTOSIS: ABNORMAL
APTT: 26.5 SEC (ref 24.5–35.1)
BASOPHILS ABSOLUTE: 0 E9/L (ref 0–0.2)
BASOPHILS RELATIVE PERCENT: 0 % (ref 0–2)
CEA: 2.4 NG/ML (ref 0–5.2)
CHLORIDE URINE RANDOM: 35 MMOL/L
CHOLESTEROL, TOTAL: 193 MG/DL (ref 0–199)
CREATININE URINE: 177 MG/DL (ref 29–226)
EOSINOPHILS ABSOLUTE: 0.04 E9/L (ref 0.05–0.5)
EOSINOPHILS RELATIVE PERCENT: 0.9 % (ref 0–6)
HCT VFR BLD CALC: 32 % (ref 34–48)
HDLC SERPL-MCNC: 16 MG/DL
HEMOGLOBIN: 10.6 G/DL (ref 11.5–15.5)
INR BLD: 1
LDL CHOLESTEROL CALCULATED: 114 MG/DL (ref 0–99)
LYMPHOCYTES ABSOLUTE: 1.23 E9/L (ref 1.5–4)
LYMPHOCYTES RELATIVE PERCENT: 29.5 % (ref 20–42)
MCH RBC QN AUTO: 26.5 PG (ref 26–35)
MCHC RBC AUTO-ENTMCNC: 33.1 % (ref 32–34.5)
MCV RBC AUTO: 80 FL (ref 80–99.9)
MONOCYTES ABSOLUTE: 0.2 E9/L (ref 0.1–0.95)
MONOCYTES RELATIVE PERCENT: 5.3 % (ref 2–12)
NEUTROPHILS ABSOLUTE: 2.62 E9/L (ref 1.8–7.3)
NEUTROPHILS RELATIVE PERCENT: 64.3 % (ref 43–80)
NUCLEATED RED BLOOD CELLS: 0 /100 WBC
OVALOCYTES: ABNORMAL
PDW BLD-RTO: 14 FL (ref 11.5–15)
PLATELET # BLD: 217 E9/L (ref 130–450)
PMV BLD AUTO: 11.1 FL (ref 7–12)
POIKILOCYTES: ABNORMAL
POLYCHROMASIA: ABNORMAL
POTASSIUM, UR: 13.7 MMOL/L
PROTEIN PROTEIN: 25 MG/DL (ref 0–12)
PROTHROMBIN TIME: 11.7 SEC (ref 9.3–12.4)
RBC # BLD: 4 E12/L (ref 3.5–5.5)
SODIUM URINE: 37 MMOL/L
TOXIC GRANULATION: ABNORMAL
TRIGL SERPL-MCNC: 314 MG/DL (ref 0–149)
VLDLC SERPL CALC-MCNC: 63 MG/DL
WBC # BLD: 4.1 E9/L (ref 4.5–11.5)

## 2020-08-04 PROCEDURE — 76770 US EXAM ABDO BACK WALL COMP: CPT

## 2020-08-04 PROCEDURE — 82272 OCCULT BLD FECES 1-3 TESTS: CPT

## 2020-08-04 PROCEDURE — 83520 IMMUNOASSAY QUANT NOS NONAB: CPT

## 2020-08-04 PROCEDURE — 6370000000 HC RX 637 (ALT 250 FOR IP): Performed by: NURSE PRACTITIONER

## 2020-08-04 PROCEDURE — 87045 FECES CULTURE AEROBIC BACT: CPT

## 2020-08-04 PROCEDURE — 85730 THROMBOPLASTIN TIME PARTIAL: CPT

## 2020-08-04 PROCEDURE — 2500000003 HC RX 250 WO HCPCS: Performed by: HOSPITALIST

## 2020-08-04 PROCEDURE — 71045 X-RAY EXAM CHEST 1 VIEW: CPT

## 2020-08-04 PROCEDURE — 87077 CULTURE AEROBIC IDENTIFY: CPT

## 2020-08-04 PROCEDURE — 6360000002 HC RX W HCPCS: Performed by: HOSPITALIST

## 2020-08-04 PROCEDURE — 82378 CARCINOEMBRYONIC ANTIGEN: CPT

## 2020-08-04 PROCEDURE — 6360000002 HC RX W HCPCS: Performed by: NURSE PRACTITIONER

## 2020-08-04 PROCEDURE — 87425 ROTAVIRUS AG IA: CPT

## 2020-08-04 PROCEDURE — 36415 COLL VENOUS BLD VENIPUNCTURE: CPT

## 2020-08-04 PROCEDURE — 82105 ALPHA-FETOPROTEIN SERUM: CPT

## 2020-08-04 PROCEDURE — 1200000000 HC SEMI PRIVATE

## 2020-08-04 PROCEDURE — 83993 ASSAY FOR CALPROTECTIN FECAL: CPT

## 2020-08-04 PROCEDURE — 2580000003 HC RX 258: Performed by: HOSPITALIST

## 2020-08-04 PROCEDURE — 85610 PROTHROMBIN TIME: CPT

## 2020-08-04 PROCEDURE — 80061 LIPID PANEL: CPT

## 2020-08-04 PROCEDURE — 86301 IMMUNOASSAY TUMOR CA 19-9: CPT

## 2020-08-04 PROCEDURE — 87328 CRYPTOSPORIDIUM AG IA: CPT

## 2020-08-04 PROCEDURE — 2580000003 HC RX 258: Performed by: NURSE PRACTITIONER

## 2020-08-04 PROCEDURE — 85025 COMPLETE CBC W/AUTO DIFF WBC: CPT

## 2020-08-04 PROCEDURE — 6360000002 HC RX W HCPCS: Performed by: INTERNAL MEDICINE

## 2020-08-04 PROCEDURE — 87329 GIARDIA AG IA: CPT

## 2020-08-04 PROCEDURE — 6370000000 HC RX 637 (ALT 250 FOR IP): Performed by: INTERNAL MEDICINE

## 2020-08-04 RX ORDER — ATORVASTATIN CALCIUM 10 MG/1
10 TABLET, FILM COATED ORAL NIGHTLY
Status: DISCONTINUED | OUTPATIENT
Start: 2020-08-04 | End: 2020-08-11 | Stop reason: HOSPADM

## 2020-08-04 RX ORDER — BRIMONIDINE TARTRATE 2 MG/ML
1 SOLUTION/ DROPS OPHTHALMIC 2 TIMES DAILY
Status: DISCONTINUED | OUTPATIENT
Start: 2020-08-04 | End: 2020-08-11 | Stop reason: HOSPADM

## 2020-08-04 RX ORDER — M-VIT,TX,IRON,MINS/CALC/FOLIC 27MG-0.4MG
1 TABLET ORAL DAILY
Status: DISCONTINUED | OUTPATIENT
Start: 2020-08-04 | End: 2020-08-11 | Stop reason: HOSPADM

## 2020-08-04 RX ORDER — ONDANSETRON 2 MG/ML
4 INJECTION INTRAMUSCULAR; INTRAVENOUS EVERY 6 HOURS PRN
Status: DISCONTINUED | OUTPATIENT
Start: 2020-08-04 | End: 2020-08-11 | Stop reason: HOSPADM

## 2020-08-04 RX ORDER — VITAMIN B COMPLEX
1000 TABLET ORAL EVERY OTHER DAY
Status: DISCONTINUED | OUTPATIENT
Start: 2020-08-04 | End: 2020-08-11 | Stop reason: HOSPADM

## 2020-08-04 RX ORDER — HEPARIN SODIUM 10000 [USP'U]/ML
5000 INJECTION, SOLUTION INTRAVENOUS; SUBCUTANEOUS EVERY 8 HOURS SCHEDULED
Status: DISCONTINUED | OUTPATIENT
Start: 2020-08-04 | End: 2020-08-11 | Stop reason: HOSPADM

## 2020-08-04 RX ORDER — SODIUM CHLORIDE 0.9 % (FLUSH) 0.9 %
10 SYRINGE (ML) INJECTION PRN
Status: DISCONTINUED | OUTPATIENT
Start: 2020-08-04 | End: 2020-08-11 | Stop reason: HOSPADM

## 2020-08-04 RX ORDER — SACCHAROMYCES BOULARDII 250 MG
250 CAPSULE ORAL 2 TIMES DAILY
Status: DISCONTINUED | OUTPATIENT
Start: 2020-08-04 | End: 2020-08-04 | Stop reason: CLARIF

## 2020-08-04 RX ORDER — SODIUM CHLORIDE 0.9 % (FLUSH) 0.9 %
10 SYRINGE (ML) INJECTION EVERY 12 HOURS SCHEDULED
Status: DISCONTINUED | OUTPATIENT
Start: 2020-08-04 | End: 2020-08-11 | Stop reason: HOSPADM

## 2020-08-04 RX ORDER — PROMETHAZINE HYDROCHLORIDE 25 MG/1
12.5 TABLET ORAL EVERY 6 HOURS PRN
Status: DISCONTINUED | OUTPATIENT
Start: 2020-08-04 | End: 2020-08-11 | Stop reason: HOSPADM

## 2020-08-04 RX ORDER — ACETAMINOPHEN 325 MG/1
650 TABLET ORAL EVERY 6 HOURS PRN
Status: DISCONTINUED | OUTPATIENT
Start: 2020-08-04 | End: 2020-08-11 | Stop reason: HOSPADM

## 2020-08-04 RX ORDER — SODIUM CHLORIDE 9 MG/ML
INJECTION, SOLUTION INTRAVENOUS CONTINUOUS
Status: DISCONTINUED | OUTPATIENT
Start: 2020-08-04 | End: 2020-08-09

## 2020-08-04 RX ORDER — POLYETHYLENE GLYCOL 3350 17 G/17G
17 POWDER, FOR SOLUTION ORAL DAILY PRN
Status: DISCONTINUED | OUTPATIENT
Start: 2020-08-04 | End: 2020-08-11 | Stop reason: HOSPADM

## 2020-08-04 RX ORDER — BRIMONIDINE TARTRATE, TIMOLOL MALEATE 2; 5 MG/ML; MG/ML
1 SOLUTION/ DROPS OPHTHALMIC EVERY 12 HOURS
Status: DISCONTINUED | OUTPATIENT
Start: 2020-08-04 | End: 2020-08-04 | Stop reason: CLARIF

## 2020-08-04 RX ORDER — TIMOLOL MALEATE 5 MG/ML
1 SOLUTION/ DROPS OPHTHALMIC 2 TIMES DAILY
Status: DISCONTINUED | OUTPATIENT
Start: 2020-08-04 | End: 2020-08-11 | Stop reason: HOSPADM

## 2020-08-04 RX ORDER — CLOPIDOGREL BISULFATE 75 MG/1
75 TABLET ORAL DAILY
Status: DISCONTINUED | OUTPATIENT
Start: 2020-08-04 | End: 2020-08-11 | Stop reason: HOSPADM

## 2020-08-04 RX ORDER — ATORVASTATIN CALCIUM 10 MG/1
10 TABLET, FILM COATED ORAL DAILY
Status: DISCONTINUED | OUTPATIENT
Start: 2020-08-05 | End: 2020-08-04

## 2020-08-04 RX ORDER — LACTOBACILLUS RHAMNOSUS GG 10B CELL
1 CAPSULE ORAL DAILY
Status: DISCONTINUED | OUTPATIENT
Start: 2020-08-04 | End: 2020-08-11 | Stop reason: HOSPADM

## 2020-08-04 RX ORDER — ALPRAZOLAM 1 MG/1
1 TABLET ORAL 3 TIMES DAILY PRN
Status: DISCONTINUED | OUTPATIENT
Start: 2020-08-04 | End: 2020-08-11 | Stop reason: HOSPADM

## 2020-08-04 RX ORDER — POTASSIUM CHLORIDE 20 MEQ/1
40 TABLET, EXTENDED RELEASE ORAL
Status: DISPENSED | OUTPATIENT
Start: 2020-08-04 | End: 2020-08-04

## 2020-08-04 RX ORDER — ASCORBIC ACID 500 MG
500 TABLET ORAL DAILY
Status: DISCONTINUED | OUTPATIENT
Start: 2020-08-04 | End: 2020-08-11 | Stop reason: HOSPADM

## 2020-08-04 RX ORDER — ACETAMINOPHEN 650 MG/1
650 SUPPOSITORY RECTAL EVERY 6 HOURS PRN
Status: DISCONTINUED | OUTPATIENT
Start: 2020-08-04 | End: 2020-08-11 | Stop reason: HOSPADM

## 2020-08-04 RX ORDER — LATANOPROST 50 UG/ML
1 SOLUTION/ DROPS OPHTHALMIC NIGHTLY
Status: DISCONTINUED | OUTPATIENT
Start: 2020-08-04 | End: 2020-08-11 | Stop reason: HOSPADM

## 2020-08-04 RX ORDER — MAGNESIUM SULFATE IN WATER 40 MG/ML
2 INJECTION, SOLUTION INTRAVENOUS ONCE
Status: COMPLETED | OUTPATIENT
Start: 2020-08-04 | End: 2020-08-04

## 2020-08-04 RX ORDER — OXYCODONE HCL 10 MG/1
10 TABLET, FILM COATED, EXTENDED RELEASE ORAL EVERY 12 HOURS SCHEDULED
Status: DISCONTINUED | OUTPATIENT
Start: 2020-08-04 | End: 2020-08-11 | Stop reason: HOSPADM

## 2020-08-04 RX ADMIN — TIMOLOL MALEATE 1 DROP: 5 SOLUTION/ DROPS OPHTHALMIC at 01:37

## 2020-08-04 RX ADMIN — MULTIPLE VITAMINS W/ MINERALS TAB 1 TABLET: TAB at 08:26

## 2020-08-04 RX ADMIN — MAGNESIUM SULFATE HEPTAHYDRATE 2 G: 40 INJECTION, SOLUTION INTRAVENOUS at 11:11

## 2020-08-04 RX ADMIN — ATORVASTATIN CALCIUM 10 MG: 10 TABLET, FILM COATED ORAL at 01:37

## 2020-08-04 RX ADMIN — VITAMIN D, TAB 1000IU (100/BT) 1000 UNITS: 25 TAB at 08:26

## 2020-08-04 RX ADMIN — BRIMONIDINE TARTRATE 1 DROP: 2 SOLUTION OPHTHALMIC at 01:37

## 2020-08-04 RX ADMIN — LATANOPROST 1 DROP: 50 SOLUTION OPHTHALMIC at 21:19

## 2020-08-04 RX ADMIN — TIMOLOL MALEATE 1 DROP: 5 SOLUTION/ DROPS OPHTHALMIC at 21:19

## 2020-08-04 RX ADMIN — HEPARIN SODIUM 5000 UNITS: 10000 INJECTION INTRAVENOUS; SUBCUTANEOUS at 05:29

## 2020-08-04 RX ADMIN — HEPARIN SODIUM 5000 UNITS: 10000 INJECTION INTRAVENOUS; SUBCUTANEOUS at 13:08

## 2020-08-04 RX ADMIN — Medication 10 ML: at 08:27

## 2020-08-04 RX ADMIN — Medication 10 ML: at 21:23

## 2020-08-04 RX ADMIN — BRIMONIDINE TARTRATE 1 DROP: 2 SOLUTION OPHTHALMIC at 21:19

## 2020-08-04 RX ADMIN — POTASSIUM CHLORIDE 40 MEQ: 20 TABLET, EXTENDED RELEASE ORAL at 11:11

## 2020-08-04 RX ADMIN — POTASSIUM CHLORIDE 40 MEQ: 20 TABLET, EXTENDED RELEASE ORAL at 13:52

## 2020-08-04 RX ADMIN — TIMOLOL MALEATE 1 DROP: 5 SOLUTION/ DROPS OPHTHALMIC at 08:26

## 2020-08-04 RX ADMIN — HEPARIN SODIUM 5000 UNITS: 10000 INJECTION INTRAVENOUS; SUBCUTANEOUS at 21:23

## 2020-08-04 RX ADMIN — Medication 1 CAPSULE: at 08:26

## 2020-08-04 RX ADMIN — OXYCODONE HYDROCHLORIDE 10 MG: 10 TABLET, FILM COATED, EXTENDED RELEASE ORAL at 13:08

## 2020-08-04 RX ADMIN — Medication 500 MG: at 08:29

## 2020-08-04 RX ADMIN — CLOPIDOGREL 75 MG: 75 TABLET, FILM COATED ORAL at 08:26

## 2020-08-04 RX ADMIN — OXYCODONE HYDROCHLORIDE 10 MG: 10 TABLET, FILM COATED, EXTENDED RELEASE ORAL at 01:37

## 2020-08-04 RX ADMIN — BRIMONIDINE TARTRATE 1 DROP: 2 SOLUTION OPHTHALMIC at 08:26

## 2020-08-04 RX ADMIN — ATORVASTATIN CALCIUM 10 MG: 10 TABLET, FILM COATED ORAL at 21:19

## 2020-08-04 RX ADMIN — ALPRAZOLAM 1 MG: 1 TABLET ORAL at 22:06

## 2020-08-04 RX ADMIN — CEFTRIAXONE 1 G: 1 INJECTION, POWDER, FOR SOLUTION INTRAMUSCULAR; INTRAVENOUS at 21:20

## 2020-08-04 RX ADMIN — LATANOPROST 1 DROP: 50 SOLUTION OPHTHALMIC at 01:37

## 2020-08-04 RX ADMIN — SODIUM CHLORIDE: 9 INJECTION, SOLUTION INTRAVENOUS at 01:37

## 2020-08-04 RX ADMIN — METRONIDAZOLE 500 MG: 500 INJECTION, SOLUTION INTRAVENOUS at 18:12

## 2020-08-04 ASSESSMENT — PAIN DESCRIPTION - ORIENTATION: ORIENTATION: LOWER

## 2020-08-04 ASSESSMENT — PAIN DESCRIPTION - DESCRIPTORS: DESCRIPTORS: DISCOMFORT;CONSTANT

## 2020-08-04 ASSESSMENT — PAIN SCALES - GENERAL
PAINLEVEL_OUTOF10: 0
PAINLEVEL_OUTOF10: 6
PAINLEVEL_OUTOF10: 5

## 2020-08-04 ASSESSMENT — PAIN DESCRIPTION - PAIN TYPE: TYPE: CHRONIC PAIN

## 2020-08-04 ASSESSMENT — PAIN DESCRIPTION - FREQUENCY: FREQUENCY: CONTINUOUS

## 2020-08-04 ASSESSMENT — PAIN DESCRIPTION - PROGRESSION: CLINICAL_PROGRESSION: NOT CHANGED

## 2020-08-04 ASSESSMENT — PAIN - FUNCTIONAL ASSESSMENT: PAIN_FUNCTIONAL_ASSESSMENT: PREVENTS OR INTERFERES SOME ACTIVE ACTIVITIES AND ADLS

## 2020-08-04 ASSESSMENT — PAIN DESCRIPTION - ONSET: ONSET: ON-GOING

## 2020-08-04 ASSESSMENT — PAIN DESCRIPTION - LOCATION: LOCATION: BACK

## 2020-08-04 NOTE — ED NOTES
Pt states she is feeling somewhat stronger than when she came in.      Rommel Mckeon RN  08/03/20 [id number]

## 2020-08-04 NOTE — CONSULTS
[id number] [address]                                  CONSULTATION    PATIENT NAME: Milagro Bell                      :        1945  MED REC NO:   [id number]                            ROOM:       8467  ACCOUNT NO:   [de-identified]                           ADMIT DATE: 2020  PROVIDER:     Andrei Horse    CONSULT DATE:  2020    REASON FOR CONSULTATION:  Right hip pain. CHIEF COMPLAINT:  Right hip pain. REQUESTING PROVIDER:  Dr. Miguel Angel Duncan. PAIN SCORE:  5/10. HISTORY OF PRESENT ILLNESS:  This is a 80-year-old female admitted to  Medicine for diarrhea. She has had longstanding bilateral hip pain. Today, her right side is worse than her left side. She has pain with  activities. She has less pain at rest.  She has some pain with  ambulation. Her pain is the same. Her pain is an ache. She has pain  in her right groin area. PAST MEDICAL HISTORY:  Kidney disease, hypertension, rhabdomyolysis. PAST SURGICAL HISTORY:  Back surgery, , eye surgery, right  shoulder surgery, left knee surgery. MEDICATIONS:  See list.    ALLERGIES:  NALBUPHINE AND VISTARIL. SOCIAL HISTORY:  Quit smoking. Admits to alcohol use. Denies drugs. FAMILY HISTORY:  Cancer in mother. Heart disease in mother. REVIEW OF SYSTEMS:  Denies any chest pain, shortness of breath,  abdominal pain, nausea, vomiting, fever, chills, diaphoresis. PHYSICAL EXAMINATION:  GENERAL:  In no acute distress. Awake, alert, responds to commands. HEAD:  Normocephalic, atraumatic. EYES:  Extraocular movements intact. Sclerae clear. NECK:  Supple. Midline. CARDIOVASCULAR:  No apparent abnormalities. LUNGS:  No acute distress. ABDOMINAL:  Nontender to palpation. EXTREMITIES:  Right lower extremity:  Able to lift the leg up the bed. She has minimal right hip pain.   She has good active and passive range  of

## 2020-08-04 NOTE — CARE COORDINATION
CM/ANDREW met with pt; introduced ourselves as the discharge planning team. Pt states she lives alone, uses cane , but has outside support from son and grandchildren. No ESTELLA or [facility] HX. PCP is ; pt aware her kidney values are abnormal, cites no prior issues with her kidneys. also aware she is in isolation for diarrhea, pening c-diff results. Plan at discharge is for home, no needs. Family can transport. Will monitor and follow for any potential needs should they arise. Rg Russo.

## 2020-08-04 NOTE — ED NOTES
I called [facility] and was told they are sending [facility] and they should be here in about 25\"     Orlando Cruz, RN  08/03/20 2854

## 2020-08-04 NOTE — CONSULTS
[id number] [address]                                  CONSULTATION    PATIENT NAME: Luisa Mullen                      :        1945  MED REC NO:   [id number]                            ROOM:       8507  ACCOUNT NO:   [de-identified]                           ADMIT DATE: 2020  PROVIDER:     Maximino Stovall MD    CONSULT DATE:  2020    CHIEF COMPLAINT:  She is in room 535 with a chief complaint of  questionable kidney lesions. PRESENT ILLNESS:  This 51-year-old female, who denies any hematuria or  difficulty with bladder control, was admitted to the hospital for  evaluation of diarrhea. A CAT scan was performed demonstrating what  appeared to be bilateral lesions. The one lesion was not able to be  clearly identified as a cyst.    PAST MEDICAL HISTORY:  The patient has been bothered with hip pain and  has been seen by Orthopedics who feels she has arthritis. ALLERGIES:  NALBUPHINE AND VISTARIL. SOCIAL HISTORY:  The patient has quit smoking cigarettes. FAMILY HISTORY:  Cancer in mother as well as heart disease. PHYSICAL EXAMINATION:  The patient is alert, oriented, appears to be in  good health. Her abdomen is soft. There are no palpable organs or  masses present. Her urinalysis was negative. Review of her CAT scan: The lesions  appeared to be probably cystic. Ultrasonography will be asked for.   If  this can clearly identify cyst, no further evaluation is necessary;  however, if this does not show a definite diagnosis, then unenhanced  ultrasound will be ordered as an outpatient        Alivia Romero MD    D: 2020 15:06:44       T: 2020 15:12:23     [id number]  Job#: [id number]     Doc#: [id number]    CC:

## 2020-08-04 NOTE — CONSULTS
32 with previous hemoglobin between 12 and 13 this year and in July of last year with previously noted episodes of mild anemia with hemoglobin around 11 g/dL.   Patient reports most recent colonoscopy approximately 3 years ago performed by Dr. Clifford Crawley and she reports that it was unremarkable but she felt very sick after the procedure    Information sources:   -Patient  -medical record  -health care team    PMHx:  Past Medical History:   Diagnosis Date    Chronic renal disease, stage II 2012    Hypertension     Rhabdomyolysis 1/15/2013       PSHx:  Past Surgical History:   Procedure Laterality Date    BACK SURGERY       SECTION      X2    EYE SURGERY      left eye-4 or 5 surgeries, detatched retina x2, has a buckle in her left eye    HYSTERECTOMY      KNEE SURGERY      left knee    ROTATOR CUFF REPAIR      2017 right shoulder    TUBAL LIGATION         Meds:  Current Facility-Administered Medications   Medication Dose Route Frequency Provider Last Rate Last Dose    ALPRAZolam (XANAX) tablet 1 mg  1 mg Oral TID PRN Sol Chester, APRN - CNP        ascorbic acid (VITAMIN C) tablet 500 mg  500 mg Oral Daily Sol Chester, APRN - CNP   500 mg at 20 2340    clopidogrel (PLAVIX) tablet 75 mg  75 mg Oral Daily Sol Chester, APRN - CNP   75 mg at 20 0826    latanoprost (XALATAN) 0.005 % ophthalmic solution 1 drop  1 drop Both Eyes Nightly Sol Chester, APRN - CNP   1 drop at 20 4726    therapeutic multivitamin-minerals 1 tablet  1 tablet Oral Daily Sol Chester, APRN - CNP   1 tablet at 20 0376    vitamin D (CHOLECALCIFEROL) tablet 1,000 Units  1,000 Units Oral Every Other Day Sol Chester, APRN - CNP   1,000 Units at 20 1872    oxyCODONE (OXYCONTIN) extended release tablet 10 mg  10 mg Oral 2 times per day Layne Barber APRN - CNP   10 mg at 20 1308    0.9 % sodium chloride infusion   Intravenous Continuous Eulogio D Masters, APRN -  mL/hr at 08/04/20 2456      sodium chloride flush 0.9 % injection 10 mL  10 mL Intravenous 2 times per day SORIN Sarkar CNP   10 mL at 08/04/20 0827    sodium chloride flush 0.9 % injection 10 mL  10 mL Intravenous PRN [facility] Henrik, APRN - AVA        acetaminophen (TYLENOL) tablet 650 mg  650 mg Oral Q6H PRN [facility] Henrik, SORIN Rolon CNP        Or   [place] acetaminophen (TYLENOL) suppository 650 mg  650 mg Rectal Q6H PRN [facility] Henrik, APRN - AVA        polyethylene glycol (GLYCOLAX) packet 17 g  17 g Oral Daily PRN [facility] Henrik, SORIN - AVA        promethazine (PHENERGAN) tablet 12.5 mg  12.5 mg Oral Q6H PRN [facility] Henrik, SORIN Rolon CNP        Or    ondansetron (ZOFRAN) injection 4 mg  4 mg Intravenous Q6H PRN [facility] Henrik, SORIN - CNP        heparin (porcine) injection 5,000 Units  5,000 Units Subcutaneous 3 times per day SORIN Sarkar CNP   5,000 Units at 08/04/20 1308    atorvastatin (LIPITOR) tablet 10 mg  10 mg Oral Nightly [facility] SORIN Chester - CNP   10 mg at 08/04/20 8114    lactobacillus (CULTURELLE) capsule 1 capsule  1 capsule Oral Daily [facility] SORIN Chester - CNP   1 capsule at 08/04/20 0826    brimonidine (ALPHAGAN) 0.2 % ophthalmic solution 1 drop  1 drop Both Eyes BID [facility] Henrik, APRN - CNP   1 drop at 08/04/20 5375    And    timolol (TIMOPTIC) 0.5 % ophthalmic solution 1 drop  1 drop Both Eyes BID [facility] Henrik, APRN - CNP   1 drop at 08/04/20 8042    potassium chloride (KLOR-CON M) extended release tablet 40 mEq  40 mEq Oral Q2H Adonis Thakkar MD   40 mEq at 08/04/20 1352       SocHx:  Social History     Socioeconomic History    Marital status:       Spouse name: Not on file    Number of children: Not on file    Years of education: Not on file    Highest education level: Not on file   Occupational History    Not on file   Social Needs    Financial resource strain: Not on midline  Chest: CTA B  Cor: Regular/S1-S2; no gallop  Abd.: Soft and obese. Nontender. BS +/4  Extr.:  No significant peripheral edema  Muscles: Decreased tone and bulk, consistent with age and condition  Skin: Warm and dry. Anicteric:      DATA:     Lab Results   Component Value Date    WBC 4.1 08/04/2020    RBC 4.00 08/04/2020    HGB 10.6 08/04/2020    HCT 32.0 08/04/2020    MCV 80.0 08/04/2020    MCH 26.5 08/04/2020    MCHC 33.1 08/04/2020    RDW 14.0 08/04/2020     08/04/2020    MPV 11.1 08/04/2020     Lab Results   Component Value Date     08/03/2020    K 2.9 08/03/2020    CL 93 08/03/2020    CO2 26 08/03/2020    BUN 49 08/03/2020    CREATININE 5.4 08/03/2020    CALCIUM 8.8 08/03/2020    PROT 7.5 08/03/2020    LABALBU 3.9 08/03/2020    LABALBU 3.8 02/20/2012    BILITOT 0.7 08/03/2020    ALKPHOS 60 08/03/2020    AST 50 08/03/2020    ALT 29 08/03/2020     Lab Results   Component Value Date    LIPASE 44 08/03/2020     Lab Results   Component Value Date    AMYLASE 96 10/28/2014         ASSESSMENT/PLAN:  1. Pancreatic mass  -Further evaluation with contrasted MRI however if kidney function does not allow consider outpatient endoscopic ultrasound    2. Diarrhea  -Infectious etiology cannot be excluded  -Stool studies pending  -Start empiric antibiotics  -Plan for nonemergent; likely outpatient colonoscopy    3. Anemia  -Acute diarrhea without evidence of overt bleed  -Suspect component of hemodilution  -Colonoscopy as above  -Pending iron studies; obtain FOBT  -In case of precipitous drop in H&H/overt bleed, may consider further evaluation with upper endoscopy in the hospital  -PPI    4. Acute on chronic renal failure  -SAW on CKD likely related to dehydration secondary to poor oral intake/diarrhea  -Nephrology input appreciated    Above has been discussed with the patient and all questions answered to her satisfaction. She is agreeable with the plan as delineated.     Thank you for the

## 2020-08-04 NOTE — H&P
daily as needed. vitamin D (CHOLECALCIFEROL) 1000 units TABS tablet, Take 1,000 Units by mouth every other day Last taken Monday 8/3    Allergies:    Nalbuphine and Vistaril [hydroxyzine hcl]    Social History:    reports that she has quit smoking. Her smoking use included cigarettes. She quit after 5.00 years of use. She has never used smokeless tobacco. She reports current alcohol use. She reports that she does not use drugs. Family History:   family history includes Cancer in her mother; Heart Disease in her mother. REVIEW OF SYSTEMS:  As above in the HPI, otherwise negative    PHYSICAL EXAM:    Vitals:  /67   Pulse 76   Temp 98.5 °F (36.9 °C) (Oral)   Resp 16   Ht 5' 5\" (1.651 m)   Wt 154 lb 8 oz (70.1 kg)   SpO2 98%   BMI 25.71 kg/m²     General:  Awake, alert, oriented X 3. Well developed, well nourished, well groomed. No apparent distress. HEENT:  Normocephalic, atraumatic. Pupils equal, round, reactive to light. No scleral icterus. No conjunctival injection. Normal lips, teeth, and gums. No nasal discharge. Neck:  Supple  Heart:  RRR, no murmurs, gallops, rubs  Lungs:  CTA bilaterally, bilat symmetrical expansion, no wheeze, rales, or rhonchi  Abdomen:   Bowel sounds present, soft, nontender, no masses, no organomegaly, no peritoneal signs  Extremities:  No clubbing, cyanosis, or edema  Skin:  Warm and dry, no open lesions or rash  Neuro:  Cranial nerves 2-12 intact, no focal deficits  Breast: deferred  Rectal: deferred  Genitalia:  deferred    LABS:    CBC with Differential:    Lab Results   Component Value Date    WBC 4.1 08/04/2020    RBC 4.00 08/04/2020    HGB 10.6 08/04/2020    HCT 32.0 08/04/2020     08/04/2020    MCV 80.0 08/04/2020    MCH 26.5 08/04/2020    MCHC 33.1 08/04/2020    RDW 14.0 08/04/2020    SEGSPCT 38 02/20/2014    LYMPHOPCT 23.7 08/03/2020    MONOPCT 4.4 08/03/2020    EOSPCT 2 10/08/2010    BASOPCT 0.2 08/03/2020    MONOSABS 0.23 08/03/2020 LYMPHSABS 1.24 08/03/2020    EOSABS 0.07 08/03/2020    BASOSABS 0.01 08/03/2020     CMP:    Lab Results   Component Value Date     08/03/2020    K 2.9 08/03/2020    CL 93 08/03/2020    CO2 26 08/03/2020    BUN 49 08/03/2020    CREATININE 5.4 08/03/2020    GFRAA 9 08/03/2020    LABGLOM 9 08/03/2020    GLUCOSE 126 08/03/2020    GLUCOSE 120 02/20/2012    PROT 7.5 08/03/2020    LABALBU 3.9 08/03/2020    LABALBU 3.8 02/20/2012    CALCIUM 8.8 08/03/2020    BILITOT 0.7 08/03/2020    ALKPHOS 60 08/03/2020    AST 50 08/03/2020    ALT 29 08/03/2020     BMP:    Lab Results   Component Value Date     08/03/2020    K 2.9 08/03/2020    CL 93 08/03/2020    CO2 26 08/03/2020    BUN 49 08/03/2020    LABALBU 3.9 08/03/2020    LABALBU 3.8 02/20/2012    CREATININE 5.4 08/03/2020    CALCIUM 8.8 08/03/2020    GFRAA 9 08/03/2020    LABGLOM 9 08/03/2020    GLUCOSE 126 08/03/2020    GLUCOSE 120 02/20/2012     Magnesium:    Lab Results   Component Value Date    MG 1.8 08/03/2020     Phosphorus:    Lab Results   Component Value Date    PHOS 3.4 06/09/2020     PT/INR:    Lab Results   Component Value Date    PROTIME 11.7 08/04/2020    INR 1.0 08/04/2020     PTT:    Lab Results   Component Value Date    APTT 26.5 08/04/2020   [APTT}  Troponin:    Lab Results   Component Value Date    TROPONINI 0.02 01/15/2013    TROPONINI <0.01 02/15/2012     Last 3 Troponin:    Lab Results   Component Value Date    TROPONINI 0.02 01/15/2013    TROPONINI <0.01 02/15/2012     U/A:    Lab Results   Component Value Date    COLORU Yellow 08/03/2020    PROTEINU TRACE 08/03/2020    PHUR 5.0 08/03/2020    LABCAST FEW  01/15/2013    WBCUA NONE 08/03/2020    RBCUA NONE 08/03/2020    RBCUA NONE 01/15/2013    BACTERIA NONE SEEN 08/03/2020    CLARITYU Clear 08/03/2020    SPECGRAV 1.020 08/03/2020    LEUKOCYTESUR Negative 08/03/2020    UROBILINOGEN 0.2 08/03/2020    BILIRUBINUR Negative 08/03/2020    BLOODU Negative 08/03/2020    GLUCOSEU Negative 08/03/2020 AMORPHOUS FEW 01/15/2013     HgBA1c:    Lab Results   Component Value Date    LABA1C 6.4 07/16/2020     FLP:    Lab Results   Component Value Date    TRIG 314 08/04/2020    HDL 16 08/04/2020    LDLCALC 114 08/04/2020    LABVLDL 63 08/04/2020     TSH:    Lab Results   Component Value Date    TSH 4.420 02/20/2014       ASSESSMENT:      Patient Active Problem List   Diagnosis    Essential hypertension    Acute kidney injury ([facility] [address]    Pancreatic lesion    Avascular necrosis of femoral head, left (HCC)    Diarrhea    Enteritis    Dehydration         PLAN:    Blood pressure ok, continue current medications  Secondary to dehydration. IV fluids. Encourage orals. GI evaluation. Ortho evaluation. Secondary to enteritis. Continue conservative therapy. As above. IV fluids.   Pt/Ot evaluations for discharge planning    Jeferson Rosario MD  11:22 AM  8/4/2020

## 2020-08-04 NOTE — CONSULTS
nd  Ext: (-) bilat  lower extremity edema  Psychiatric: mood and affect appropriate, cr nr 2-12 grossly intact  Musculoskeletal:  Rom, muscular strength intact    Data:   Labs:  CBC:   Lab Results   Component Value Date    WBC 5.2 08/03/2020    RBC 4.66 08/03/2020    HGB 12.4 08/03/2020    HCT 37.4 08/03/2020    MCV 80.3 08/03/2020    MCH 26.6 08/03/2020    MCHC 33.2 08/03/2020    RDW 13.7 08/03/2020     08/03/2020    MPV 10.7 08/03/2020     CBC with Differential:    Lab Results   Component Value Date    WBC 5.2 08/03/2020    RBC 4.66 08/03/2020    HGB 12.4 08/03/2020    HCT 37.4 08/03/2020     08/03/2020    MCV 80.3 08/03/2020    MCH 26.6 08/03/2020    MCHC 33.2 08/03/2020    RDW 13.7 08/03/2020    SEGSPCT 38 02/20/2014    LYMPHOPCT 23.7 08/03/2020    MONOPCT 4.4 08/03/2020    EOSPCT 2 10/08/2010    BASOPCT 0.2 08/03/2020    MONOSABS 0.23 08/03/2020    LYMPHSABS 1.24 08/03/2020    EOSABS 0.07 08/03/2020    BASOSABS 0.01 08/03/2020     Hemoglobin/Hematocrit:    Lab Results   Component Value Date    HGB 12.4 08/03/2020    HCT 37.4 08/03/2020     CMP:    Lab Results   Component Value Date     08/03/2020    K 2.9 08/03/2020    CL 93 08/03/2020    CO2 26 08/03/2020    BUN 49 08/03/2020    CREATININE 5.4 08/03/2020    GFRAA 9 08/03/2020    LABGLOM 9 08/03/2020    GLUCOSE 126 08/03/2020    GLUCOSE 120 02/20/2012    PROT 7.5 08/03/2020    LABALBU 3.9 08/03/2020    LABALBU 3.8 02/20/2012    CALCIUM 8.8 08/03/2020    BILITOT 0.7 08/03/2020    ALKPHOS 60 08/03/2020    AST 50 08/03/2020    ALT 29 08/03/2020     BMP:    Lab Results   Component Value Date     08/03/2020    K 2.9 08/03/2020    CL 93 08/03/2020    CO2 26 08/03/2020    BUN 49 08/03/2020    LABALBU 3.9 08/03/2020    LABALBU 3.8 02/20/2012    CREATININE 5.4 08/03/2020    CALCIUM 8.8 08/03/2020    GFRAA 9 08/03/2020    LABGLOM 9 08/03/2020    GLUCOSE 126 08/03/2020    GLUCOSE 120 02/20/2012     BUN/Creatinine:    Lab Results   Component Value Date    BUN 49 08/03/2020    CREATININE 5.4 08/03/2020     Hepatic Function Panel:    Lab Results   Component Value Date    ALKPHOS 60 08/03/2020    ALT 29 08/03/2020    AST 50 08/03/2020    PROT 7.5 08/03/2020    BILITOT 0.7 08/03/2020    BILIDIR 0.2 01/15/2013    LABALBU 3.9 08/03/2020    LABALBU 3.8 02/20/2012     Albumin:    Lab Results   Component Value Date    LABALBU 3.9 08/03/2020    LABALBU 3.8 02/20/2012     Calcium:    Lab Results   Component Value Date    CALCIUM 8.8 08/03/2020     Ionized Calcium:  No results found for: IONCA  Magnesium:    Lab Results   Component Value Date    MG 1.8 08/03/2020     Phosphorus:    Lab Results   Component Value Date    PHOS 3.4 06/09/2020     LDH:  No results found for: LDH  Uric Acid:  No results found for: Levi Stephens  PT/INR:    Lab Results   Component Value Date    PROTIME 11.7 08/03/2020    INR 1.1 08/03/2020     PTT:  No results found for: APTT, PTT[APTT}  Troponin:    Lab Results   Component Value Date    TROPONINI 0.02 01/15/2013    TROPONINI <0.01 02/15/2012     U/A:    Lab Results   Component Value Date    COLORU Yellow 08/03/2020    PROTEINU TRACE 08/03/2020    PHUR 5.0 08/03/2020    LABCAST FEW  01/15/2013    WBCUA NONE 08/03/2020    RBCUA NONE 08/03/2020    RBCUA NONE 01/15/2013    BACTERIA NONE SEEN 08/03/2020    CLARITYU Clear 08/03/2020    SPECGRAV 1.020 08/03/2020    LEUKOCYTESUR Negative 08/03/2020    UROBILINOGEN 0.2 08/03/2020    BILIRUBINUR Negative 08/03/2020    BLOODU Negative 08/03/2020    GLUCOSEU Negative 08/03/2020    AMORPHOUS FEW 01/15/2013     ABG:  No results found for: PH, PCO2, PO2, HCO3, BE, THGB, TCO2, O2SAT  HgBA1c:    Lab Results   Component Value Date    LABA1C 6.4 07/16/2020     Microalbumen/Creatinine ratio:  No components found for: RUCREAT  FLP:    Lab Results   Component Value Date    TRIG 98 06/09/2020    HDL 45 06/09/2020    LDLCALC 134 06/09/2020    LABVLDL 20 06/09/2020     TSH:    Lab Results   Component Value Date TSH 4.420 2014     VITAMIN B12: No components found for: B12  FOLATE:  No results found for: FOLATE  Iron Saturation:  No components found for: PERCENTFE  FERRITIN:  No results found for: FERRITIN  AMYLASE:    Lab Results   Component Value Date    AMYLASE 96 10/28/2014     LIPASE:    Lab Results   Component Value Date    LIPASE 44 2020     Fibrinogen Level:  No components found for: FIB  24 Hour Urine for Protein:  No components found for: RAWUPRO, UHRS3, DCEG62MV, UTV3  24 Hour Urine for Creatinine Clearance:  No components found for: Bill Drummond, UTV10     Imaging:  Patient MRN: [id number]    : 1945    Age:  74 years    Gender: Female    Order Date: 2020 6:35 AM    Exam: XR CHEST PORTABLE    Number of Images: 1 view    Indication:   Cough    Cough    Comparison: 2018         FINDINGS:         Heart and pulmonary vascularity normal. Lungs clear. Costophrenic    angles sharp. Normal aorta.                   Impression    No acute cardiopulmonary findings. Patient MRN:  [id number]    : 1945    Age: 76 years    Gender: Female    Order Date:  8/3/2020 4:36 PM    EXAM: CT ABDOMEN PELVIS WO CONTRAST    INDICATION:  acute kidney injury    acute kidney injury      COMPARISON: CT the abdomen and pelvis, 2010         Technique: Low-dose CT  acquisition technique included one of    following options; 1 . Automated exposure control, 2. Adjustment of MA    and or KV according to patient's size or 3. Use of iterative    reconstruction.         Multiple computerized tomography sections of the abdomen and pelvis    with sagittal and coronal MPR reconstructions were obtained from the    top of the diaphragm to the pelvis.                FINDINGS:         LOWER THORAX: Unremarkable. LIVER: Unremarkable. GALLBLADDER:Unremarkable    SPLEEN:Unremarkable. ADRENALS:Unremarkable. KIDNEYS: The kidneys are normal in size. No stone or hydronephrosis is    seen.  Multiple small lesions are seen in the kidneys, which may    represent cysts. A 1.5 cm exophytic hyperdense lesion along the upper    pole of the left kidney may represent a solid lesion. Evaluation with sonography is recommended. PANCREAS: 1.6 cm hypodense lesion is seen in the region of the    uncinate process. It was not visualized on the prior CT from 5/12/2010    BOWEL: No bowel wall thickening or obstruction. APPENDIX:Unremarkable. BLADDER:Unremarkable. REPRODUCTIVE ORGANS: Status post hysterectomy. VASCULATURE:Mild calcified atherosclerosis seen in the aorta. No    aneurysm. LYMPH NODES:Unremarkable. BONES:Evaluation of the bones reveals no fracture or joint    dislocation. Prominent sclerosis along the left femoral head likely    due to avascular necrosis. Severe loss of disc height at L5-S1. Celestino Thomas MISCELLANEOUS:No additional finding.              Impression              1. No acute abnormality seen in the abdomen or the pelvis. 2. Bilateral renal lesions, including a 1.6 cm hyperdense lesion on    the left.      3. 1.6 and a hypodense lesion in the uncinate process of the pancreas. 4.. Postcontrast enhanced MRI of the abdomen is recommended to    evaluate both the renal and pancreatic lesions. Assessment  1-Stage III SAW sec to decreased effective renal perfusion  as evidenced by the FeNa <1%-due to the diarrhea with the decreased po intake as well as the ARB+HCTZ  UA (-) except for 25mg/dl protein (-) blood  PLAN:1. Continue to monitor on IVF  2. Keep off the ARB/HCTZ    2-Complex L Renal Cyst  PLAN:1. Once the cr returns to baseline will then need a contrast based study    3-Anemia in CKD  PLAN:1. Check Fe++, B12, folate, SPEP and UPEP    4- Sec HPTH of Renal Origin  PLAN:1.  Check PO4, PTH, Vit D    5- HTN with CKD I-IV  BP at goal <130/80  PLAN:1. Observe off the ARB+HCTZ    6- CKD G3 B baseline serum cr 1.4-1.5mg/dl with an e-GFR=41-44ml/min presumed sec to microvascular disease      Thank you  for allowing us to participate in care of Burak Nelson Jordi  10:41 AM  8/4/2020

## 2020-08-04 NOTE — ED NOTES
Pt up to bedside commode with minimal assitance and voided 300cc yellow urine, has not had any diarrhea while here. Urine to lab for testing.      Bryce Bell RN  08/03/20 2004

## 2020-08-05 PROBLEM — N18.30 CKD (CHRONIC KIDNEY DISEASE) STAGE 3, GFR 30-59 ML/MIN (HCC): Chronic | Status: ACTIVE | Noted: 2020-08-05

## 2020-08-05 LAB
ALBUMIN SERPL-MCNC: 3 G/DL (ref 3.5–5.2)
ALP BLD-CCNC: 51 U/L (ref 35–104)
ALT SERPL-CCNC: 15 U/L (ref 0–32)
ANION GAP SERPL CALCULATED.3IONS-SCNC: 15 MMOL/L (ref 7–16)
ANISOCYTOSIS: ABNORMAL
AST SERPL-CCNC: 19 U/L (ref 0–31)
BASOPHILS ABSOLUTE: 0.02 E9/L (ref 0–0.2)
BASOPHILS RELATIVE PERCENT: 0.5 % (ref 0–2)
BILIRUB SERPL-MCNC: 0.4 MG/DL (ref 0–1.2)
BUN BLDV-MCNC: 31 MG/DL (ref 8–23)
CALCIUM IONIZED: 1.19 MMOL/L (ref 1.15–1.33)
CALCIUM SERPL-MCNC: 8.3 MG/DL (ref 8.6–10.2)
CHLORIDE BLD-SCNC: 106 MMOL/L (ref 98–107)
CO2: 23 MMOL/L (ref 22–29)
CREAT SERPL-MCNC: 2.2 MG/DL (ref 0.5–1)
CRYPTOSPORIDIUM ANTIGEN STOOL: NORMAL
EOSINOPHILS ABSOLUTE: 0.08 E9/L (ref 0.05–0.5)
EOSINOPHILS RELATIVE PERCENT: 2.2 % (ref 0–6)
FERRITIN: 627 NG/ML
FOLATE: 12 NG/ML (ref 4.8–24.2)
GFR AFRICAN AMERICAN: 26
GFR NON-AFRICAN AMERICAN: 26 ML/MIN/1.73
GIARDIA ANTIGEN STOOL: NORMAL
GLUCOSE BLD-MCNC: 109 MG/DL (ref 74–99)
HCT VFR BLD CALC: 33.4 % (ref 34–48)
HEMOGLOBIN: 10.7 G/DL (ref 11.5–15.5)
IMMATURE GRANULOCYTES #: 0.03 E9/L
IMMATURE GRANULOCYTES %: 0.8 % (ref 0–5)
IRON SATURATION: 43 % (ref 15–50)
IRON: 55 MCG/DL (ref 37–145)
LYMPHOCYTES ABSOLUTE: 1.39 E9/L (ref 1.5–4)
LYMPHOCYTES RELATIVE PERCENT: 37.9 % (ref 20–42)
MAGNESIUM: 1.8 MG/DL (ref 1.6–2.6)
MCH RBC QN AUTO: 26.1 PG (ref 26–35)
MCHC RBC AUTO-ENTMCNC: 32 % (ref 32–34.5)
MCV RBC AUTO: 81.5 FL (ref 80–99.9)
MONOCYTES ABSOLUTE: 0.28 E9/L (ref 0.1–0.95)
MONOCYTES RELATIVE PERCENT: 7.6 % (ref 2–12)
NEUTROPHILS ABSOLUTE: 1.87 E9/L (ref 1.8–7.3)
NEUTROPHILS RELATIVE PERCENT: 51 % (ref 43–80)
OVALOCYTES: ABNORMAL
PARATHYROID HORMONE INTACT: 102 PG/ML (ref 15–65)
PDW BLD-RTO: 14 FL (ref 11.5–15)
PHOSPHORUS: 2.9 MG/DL (ref 2.5–4.5)
PLATELET # BLD: 220 E9/L (ref 130–450)
PMV BLD AUTO: 10.7 FL (ref 7–12)
POIKILOCYTES: ABNORMAL
POLYCHROMASIA: ABNORMAL
POTASSIUM SERPL-SCNC: 4.1 MMOL/L (ref 3.5–5)
RBC # BLD: 4.1 E12/L (ref 3.5–5.5)
ROTAVIRUS ANTIGEN: NORMAL
SODIUM BLD-SCNC: 144 MMOL/L (ref 132–146)
TOTAL IRON BINDING CAPACITY: 127 MCG/DL (ref 250–450)
TOTAL PROTEIN: 5.8 G/DL (ref 6.4–8.3)
VITAMIN B-12: 711 PG/ML (ref 211–946)
VITAMIN D 25-HYDROXY: 56 NG/ML (ref 30–100)
WBC # BLD: 3.7 E9/L (ref 4.5–11.5)

## 2020-08-05 PROCEDURE — 6360000002 HC RX W HCPCS: Performed by: NURSE PRACTITIONER

## 2020-08-05 PROCEDURE — 36415 COLL VENOUS BLD VENIPUNCTURE: CPT

## 2020-08-05 PROCEDURE — 80074 ACUTE HEPATITIS PANEL: CPT

## 2020-08-05 PROCEDURE — 83735 ASSAY OF MAGNESIUM: CPT

## 2020-08-05 PROCEDURE — 84100 ASSAY OF PHOSPHORUS: CPT

## 2020-08-05 PROCEDURE — 86334 IMMUNOFIX E-PHORESIS SERUM: CPT

## 2020-08-05 PROCEDURE — 84166 PROTEIN E-PHORESIS/URINE/CSF: CPT

## 2020-08-05 PROCEDURE — 82746 ASSAY OF FOLIC ACID SERUM: CPT

## 2020-08-05 PROCEDURE — 80053 COMPREHEN METABOLIC PANEL: CPT

## 2020-08-05 PROCEDURE — 86301 IMMUNOASSAY TUMOR CA 19-9: CPT

## 2020-08-05 PROCEDURE — 84165 PROTEIN E-PHORESIS SERUM: CPT

## 2020-08-05 PROCEDURE — 2500000003 HC RX 250 WO HCPCS: Performed by: HOSPITALIST

## 2020-08-05 PROCEDURE — 6370000000 HC RX 637 (ALT 250 FOR IP): Performed by: NURSE PRACTITIONER

## 2020-08-05 PROCEDURE — 82728 ASSAY OF FERRITIN: CPT

## 2020-08-05 PROCEDURE — 82607 VITAMIN B-12: CPT

## 2020-08-05 PROCEDURE — 83970 ASSAY OF PARATHORMONE: CPT

## 2020-08-05 PROCEDURE — 1200000000 HC SEMI PRIVATE

## 2020-08-05 PROCEDURE — 6370000000 HC RX 637 (ALT 250 FOR IP): Performed by: INTERNAL MEDICINE

## 2020-08-05 PROCEDURE — 82306 VITAMIN D 25 HYDROXY: CPT

## 2020-08-05 PROCEDURE — 83540 ASSAY OF IRON: CPT

## 2020-08-05 PROCEDURE — 85025 COMPLETE CBC W/AUTO DIFF WBC: CPT

## 2020-08-05 PROCEDURE — 83550 IRON BINDING TEST: CPT

## 2020-08-05 PROCEDURE — 82330 ASSAY OF CALCIUM: CPT

## 2020-08-05 PROCEDURE — 2580000003 HC RX 258: Performed by: NURSE PRACTITIONER

## 2020-08-05 RX ORDER — METRONIDAZOLE 500 MG/1
500 TABLET ORAL EVERY 8 HOURS SCHEDULED
Status: DISCONTINUED | OUTPATIENT
Start: 2020-08-05 | End: 2020-08-11 | Stop reason: HOSPADM

## 2020-08-05 RX ADMIN — METRONIDAZOLE 500 MG: 500 TABLET, FILM COATED ORAL at 18:40

## 2020-08-05 RX ADMIN — TIMOLOL MALEATE 1 DROP: 5 SOLUTION/ DROPS OPHTHALMIC at 08:50

## 2020-08-05 RX ADMIN — BRIMONIDINE TARTRATE 1 DROP: 2 SOLUTION OPHTHALMIC at 08:49

## 2020-08-05 RX ADMIN — OXYCODONE HYDROCHLORIDE 10 MG: 10 TABLET, FILM COATED, EXTENDED RELEASE ORAL at 00:01

## 2020-08-05 RX ADMIN — SODIUM CHLORIDE: 9 INJECTION, SOLUTION INTRAVENOUS at 03:10

## 2020-08-05 RX ADMIN — SODIUM CHLORIDE: 9 INJECTION, SOLUTION INTRAVENOUS at 15:28

## 2020-08-05 RX ADMIN — MULTIPLE VITAMINS W/ MINERALS TAB 1 TABLET: TAB at 08:49

## 2020-08-05 RX ADMIN — ONDANSETRON 4 MG: 2 INJECTION INTRAMUSCULAR; INTRAVENOUS at 12:02

## 2020-08-05 RX ADMIN — ATORVASTATIN CALCIUM 10 MG: 10 TABLET, FILM COATED ORAL at 21:35

## 2020-08-05 RX ADMIN — TIMOLOL MALEATE 1 DROP: 5 SOLUTION/ DROPS OPHTHALMIC at 21:35

## 2020-08-05 RX ADMIN — PROMETHAZINE HYDROCHLORIDE 12.5 MG: 25 TABLET ORAL at 21:43

## 2020-08-05 RX ADMIN — HEPARIN SODIUM 5000 UNITS: 10000 INJECTION INTRAVENOUS; SUBCUTANEOUS at 13:33

## 2020-08-05 RX ADMIN — HEPARIN SODIUM 5000 UNITS: 10000 INJECTION INTRAVENOUS; SUBCUTANEOUS at 05:10

## 2020-08-05 RX ADMIN — OXYCODONE HYDROCHLORIDE 10 MG: 10 TABLET, FILM COATED, EXTENDED RELEASE ORAL at 12:02

## 2020-08-05 RX ADMIN — METRONIDAZOLE 500 MG: 500 INJECTION, SOLUTION INTRAVENOUS at 03:02

## 2020-08-05 RX ADMIN — LATANOPROST 1 DROP: 50 SOLUTION OPHTHALMIC at 21:35

## 2020-08-05 RX ADMIN — ALPRAZOLAM 1 MG: 1 TABLET ORAL at 21:35

## 2020-08-05 RX ADMIN — CLOPIDOGREL 75 MG: 75 TABLET, FILM COATED ORAL at 08:49

## 2020-08-05 RX ADMIN — Medication 10 ML: at 21:36

## 2020-08-05 RX ADMIN — Medication 500 MG: at 08:49

## 2020-08-05 RX ADMIN — Medication 1 CAPSULE: at 08:49

## 2020-08-05 RX ADMIN — METRONIDAZOLE 500 MG: 500 INJECTION, SOLUTION INTRAVENOUS at 10:04

## 2020-08-05 RX ADMIN — HEPARIN SODIUM 5000 UNITS: 10000 INJECTION INTRAVENOUS; SUBCUTANEOUS at 21:36

## 2020-08-05 RX ADMIN — BRIMONIDINE TARTRATE 1 DROP: 2 SOLUTION OPHTHALMIC at 21:35

## 2020-08-05 ASSESSMENT — PAIN SCALES - GENERAL
PAINLEVEL_OUTOF10: 0
PAINLEVEL_OUTOF10: 0
PAINLEVEL_OUTOF10: 7
PAINLEVEL_OUTOF10: 0
PAINLEVEL_OUTOF10: 7
PAINLEVEL_OUTOF10: 0

## 2020-08-05 NOTE — PROGRESS NOTES
Nephrology Progress Note  Patient's Name: Kaiden Jauregui  2:16 PM  2020    Nephrologist: Genesis Cavanaugh    Reason for Consult: SAW  Requesting Physician:  Cory De Souza MD    Chief Complaint:  Diarrhea    History Obtained From:  patient and past medical records    History of Present Ilness:    Kaiden Jauregui is a 76 y.o. female with prior history of CKD G3 B baseline serum cr 1.4-1.5mg/dl with an e-GFR=41-44ml/min  who follows longitudinally in the office with Dr. Zully Wood. Pt presented to the ED with the c/o diarrhea for the last 3-4 days with associated malaise and body aches. She also noted dizziness and lightheadedness. She had a decreased appetite as well. In the ED the cr 5.4mg/dl with a K+ 2.9. She is maintained on losartan HCTZ as an outptShe had a Ct Scan which showed a 1.5cm exophytic lesion in the L kidney which was hyperdense and a 1.6cm lesion in the pancreas    20: Pt states she was in the BR earlier this AM and felt \"woozy\". States lying in bed she feels better    Past Medical History:   Diagnosis Date    Chronic renal disease, stage II 2012    Hypertension     Rhabdomyolysis 1/15/2013       Past Surgical History:   Procedure Laterality Date    BACK SURGERY       SECTION      X2    EYE SURGERY      left eye-4 or 5 surgeries, detatched retina x2, has a buckle in her left eye    HYSTERECTOMY      KNEE SURGERY      left knee    ROTATOR CUFF REPAIR      2017 right shoulder    TUBAL LIGATION         Family History   Problem Relation Age of Onset    Heart Disease Mother     Cancer Mother         reports that she has quit smoking. Her smoking use included cigarettes. She quit after 5.00 years of use. She has never used smokeless tobacco. She reports current alcohol use. She reports that she does not use drugs.     Allergies:  Nalbuphine and Vistaril [hydroxyzine hcl]    Current Medications:    ALPRAZolam (XANAX) tablet 1 mg, TID PRN  ascorbic acid (VITAMIN C) tablet 500 mg, rub  Respiratory: CTA B without w/r/r  Abdomen:  +bs, soft, nt, nd  Ext: (-) bilat  lower extremity edema  Psychiatric: mood and affect appropriate, cr nr 2-12 grossly intact  Musculoskeletal:  Rom, muscular strength intact    Data:   Labs:  CBC:   Lab Results   Component Value Date    WBC 3.7 08/05/2020    RBC 4.10 08/05/2020    HGB 10.7 08/05/2020    HCT 33.4 08/05/2020    MCV 81.5 08/05/2020    MCH 26.1 08/05/2020    MCHC 32.0 08/05/2020    RDW 14.0 08/05/2020     08/05/2020    MPV 10.7 08/05/2020     CBC with Differential:    Lab Results   Component Value Date    WBC 3.7 08/05/2020    RBC 4.10 08/05/2020    HGB 10.7 08/05/2020    HCT 33.4 08/05/2020     08/05/2020    MCV 81.5 08/05/2020    MCH 26.1 08/05/2020    MCHC 32.0 08/05/2020    RDW 14.0 08/05/2020    NRBC 0.0 08/04/2020    SEGSPCT 38 02/20/2014    LYMPHOPCT 37.9 08/05/2020    MONOPCT 7.6 08/05/2020    EOSPCT 2 10/08/2010    BASOPCT 0.5 08/05/2020    MONOSABS 0.28 08/05/2020    LYMPHSABS 1.39 08/05/2020    EOSABS 0.08 08/05/2020    BASOSABS 0.02 08/05/2020     Hemoglobin/Hematocrit:    Lab Results   Component Value Date    HGB 10.7 08/05/2020    HCT 33.4 08/05/2020     CMP:    Lab Results   Component Value Date     08/05/2020    K 4.1 08/05/2020    K 2.9 08/03/2020     08/05/2020    CO2 23 08/05/2020    BUN 31 08/05/2020    CREATININE 2.2 08/05/2020    GFRAA 26 08/05/2020    LABGLOM 26 08/05/2020    GLUCOSE 109 08/05/2020    GLUCOSE 120 02/20/2012    PROT 5.8 08/05/2020    LABALBU 3.0 08/05/2020    LABALBU 3.8 02/20/2012    CALCIUM 8.3 08/05/2020    BILITOT 0.4 08/05/2020    ALKPHOS 51 08/05/2020    AST 19 08/05/2020    ALT 15 08/05/2020     BMP:    Lab Results   Component Value Date     08/05/2020    K 4.1 08/05/2020    K 2.9 08/03/2020     08/05/2020    CO2 23 08/05/2020    BUN 31 08/05/2020    LABALBU 3.0 08/05/2020    LABALBU 3.8 02/20/2012    CREATININE 2.2 08/05/2020    CALCIUM 8.3 08/05/2020    GFRAA 26 08/05/2020    LABGLOM 26 08/05/2020    GLUCOSE 109 08/05/2020    GLUCOSE 120 02/20/2012     BUN/Creatinine:    Lab Results   Component Value Date    BUN 31 08/05/2020    CREATININE 2.2 08/05/2020     Hepatic Function Panel:    Lab Results   Component Value Date    ALKPHOS 51 08/05/2020    ALT 15 08/05/2020    AST 19 08/05/2020    PROT 5.8 08/05/2020    BILITOT 0.4 08/05/2020    BILIDIR 0.2 01/15/2013    LABALBU 3.0 08/05/2020    LABALBU 3.8 02/20/2012     Albumin:    Lab Results   Component Value Date    LABALBU 3.0 08/05/2020    LABALBU 3.8 02/20/2012     Calcium:    Lab Results   Component Value Date    CALCIUM 8.3 08/05/2020     Ionized Calcium:  No results found for: IONCA  Magnesium:    Lab Results   Component Value Date    MG 1.8 08/05/2020     Phosphorus:    Lab Results   Component Value Date    PHOS 2.9 08/05/2020     LDH:  No results found for: LDH  Uric Acid:  No results found for: LABURIC, URICACID  PT/INR:    Lab Results   Component Value Date    PROTIME 11.7 08/04/2020    INR 1.0 08/04/2020     PTT:    Lab Results   Component Value Date    APTT 26.5 08/04/2020   [APTT}  Troponin:    Lab Results   Component Value Date    TROPONINI 0.02 01/15/2013    TROPONINI <0.01 02/15/2012     U/A:    Lab Results   Component Value Date    COLORU Yellow 08/03/2020    PROTEINU TRACE 08/03/2020    PHUR 5.0 08/03/2020    LABCAST FEW  01/15/2013    WBCUA NONE 08/03/2020    RBCUA NONE 08/03/2020    RBCUA NONE 01/15/2013    BACTERIA NONE SEEN 08/03/2020    CLARITYU Clear 08/03/2020    SPECGRAV 1.020 08/03/2020    LEUKOCYTESUR Negative 08/03/2020    UROBILINOGEN 0.2 08/03/2020    BILIRUBINUR Negative 08/03/2020    BLOODU Negative 08/03/2020    GLUCOSEU Negative 08/03/2020    AMORPHOUS FEW 01/15/2013     ABG:  No results found for: PH, PCO2, PO2, HCO3, BE, THGB, TCO2, O2SAT  HgBA1c:    Lab Results   Component Value Date    LABA1C 6.4 07/16/2020     Microalbumen/Creatinine ratio:  No components found for: RUCREAT  FLP:    Lab FINDINGS:         LOWER THORAX: Unremarkable. LIVER: Unremarkable. GALLBLADDER:Unremarkable    SPLEEN:Unremarkable. ADRENALS:Unremarkable. KIDNEYS: The kidneys are normal in size. No stone or hydronephrosis is    seen. Multiple small lesions are seen in the kidneys, which may    represent cysts. A 1.5 cm exophytic hyperdense lesion along the upper    pole of the left kidney may represent a solid lesion. Evaluation with sonography is recommended. PANCREAS: 1.6 cm hypodense lesion is seen in the region of the    uncinate process. It was not visualized on the prior CT from 2010    BOWEL: No bowel wall thickening or obstruction. APPENDIX:Unremarkable. BLADDER:Unremarkable. REPRODUCTIVE ORGANS: Status post hysterectomy. VASCULATURE:Mild calcified atherosclerosis seen in the aorta. No    aneurysm. LYMPH NODES:Unremarkable. BONES:Evaluation of the bones reveals no fracture or joint    dislocation. Prominent sclerosis along the left femoral head likely    due to avascular necrosis. Severe loss of disc height at L5-S1. Philbert Virgilio MISCELLANEOUS:No additional finding.              Impression              1. No acute abnormality seen in the abdomen or the pelvis. 2. Bilateral renal lesions, including a 1.6 cm hyperdense lesion on    the left.      3. 1.6 and a hypodense lesion in the uncinate process of the pancreas. 4.. Postcontrast enhanced MRI of the abdomen is recommended to    evaluate both the renal and pancreatic lesions.       US RETROPERITONEAL COMPLETE [[id number]]      Resulted: 20     Updated: 20     Narrative:      Patient Mrn: [id number]   : 1945   Age:  68 years   Gender: Female   Order Date: 2020 4:21 PM     Exam: US RETROPERITONEAL COMPLETE     Number Of Images: 46 Views     Indication:  Bilateral renal probable cysts reported by CT Abdomen and   Pelvis noncontrast imaging August 3, 2020     Comparison: Noncontrast CT abdomen and pelvis study August 3, 2020     TECHNIQUE: 2-D grayscale and color Doppler imaging were utilized for   evaluation of the kidneys and bladder. Findings: The right kidney [id number] x 4.3 x 3.7 cm, and the left   kidney measures 9.3 x 4.5 x 5.0 cm. There is no evidence of collecting system calcification, obstructive   dilation, or perinephric inflammatory change. Cortical echogenicity is   increased bilaterally, with some cortical thinning on the right,   suggesting the spectrum of medical renal disease. Cysts in the upper   pole and mid right kidney measured 2.2 and 1.2 cm diameter,   respectively. . An upper pole cyst of the left kidney measures 1.8 cm   maximum diameter. Central perfusion is intact in both kidneys by color   Doppler imaging. Images of the bladder show an estimated volume of 314 mL. The post   void residual was not measured. Bladder mural contours appear normal,   and no intraluminal abnormalities are evident. Impression:      Both kidneys show some increased cortical echogenicity suggesting the   spectrum of medical renal disease. Multifocal bilateral cysts, with   smaller bilateral cysts not enumerated above, right common finding in   patients with medical renal disease. Retrospective review of the CT   study from August 3, 2020 suggests that the posterior mid left renal   echogenic finding is likely a small hemorrhagic cyst. On this   examination, several of the anechoic cystic structures do not show   good through transmission/shadowing, which may be artifact related to   habitus and depth of the cysts from the skin surface. If further   evaluation is felt to be clinically necessary on the basis of   hematuria or other clinical concerns, MRI can identify and   differentiate solid from cystic structures. There is no evidence of obstructive dilation, collecting system   calcification, or perinephric inflammatory change on either side. Perfusion appears intact bilaterally.  The bladder is normal in   appearance. Assessment  1-Stage III SAW sec to decreased effective renal perfusion  as evidenced by the FeNa <1%-due to the diarrhea with the decreased po intake as well as the ARB+HCTZ  UA (-) except for 25mg/dl protein (-) blood  Cr trending down 5.4-->2.2mg/dl  PLAN:1. Continue to monitor on IVF  2. Keep off the ARB/HCTZ    2-Complex L Renal Cyst  PLAN:1. Once the cr returns to baseline will then need a contrast based study    3-Anemia in CKD  Ferritin 627, Iron Sat 43%  Folate 12, Vit B12 711  PLAN:1. Await SPEP and UPEP    4- Sec HPTH of Renal Origin  PO4 WNL  , Vit D 56  PLAN:1. Follow Ca++ and PO4    5- HTN with CKD I-IV  BP above goal <130/80  PLAN:1. Continue to hold the ARB and HCTZ    6- CKD G3 B baseline serum cr 1.4-1.5mg/dl with an e-GFR=41-44ml/min presumed sec to microvascular disease    6.  Diarrhea-Stool Culture with Gram (-) salma      Thank you  for allowing us to participate in care of Jesse Ulrich Jordi  2:16 PM  8/5/2020

## 2020-08-05 NOTE — PROGRESS NOTES
8/5/2020 10:13 AM  Service: Urology  Group: [facility] ([facility])    Trena Barrientos  [id number]    Subjective: Patient is being assessed for gastrointestinal related symptoms  Talked to her about her renal ultrasound which is still shows a small cyst on the left kidney that is not fully delineated by ultrasound    Review of Systems  Respiratory: negative  Cardiovascular: negative  Gastrointestinal: negative  Hematologic/lymphatic: negative  Musculoskeletal:negative  Neurological: negative  Endocrine: negative    Scheduled Meds:   vitamin C  500 mg Oral Daily    clopidogrel  75 mg Oral Daily    latanoprost  1 drop Both Eyes Nightly    therapeutic multivitamin-minerals  1 tablet Oral Daily    Vitamin D  1,000 Units Oral Every Other Day    oxyCODONE  10 mg Oral 2 times per day    sodium chloride flush  10 mL Intravenous 2 times per day    heparin (porcine)  5,000 Units Subcutaneous 3 times per day    atorvastatin  10 mg Oral Nightly    lactobacillus  1 capsule Oral Daily    brimonidine  1 drop Both Eyes BID    And    timolol  1 drop Both Eyes BID    cefTRIAXone (ROCEPHIN) IV  1 g Intravenous Q24H    metroNIDAZOLE  500 mg Intravenous Q8H       Objective:  Vitals:    08/05/20 0745   BP: (!) 160/78   Pulse: 70   Resp: 16   Temp: 97.8 °F (36.6 °C)   SpO2: 96%         Allergies: Nalbuphine and Vistaril [hydroxyzine hcl]    General Appearance: She was awake and alert and somewhat frustrated by all the consultations that she must endure   skin: no rash or erythema  Head: normocephalic and atraumatic  Pulmonary/Chest: clear to auscultation bilaterally- no wheezes, rales or rhonchi, normal air movement, no respiratory distress and no chest wall tenderness  Abdomen: soft, non-tender, non-distended, normal bowel sounds, no masses or organomegaly and no inguinal adenopathy  Genitourinary: genitals normal without hernia or inguinal adenopathy  Extremities: no cyanosis, clubbing or edema and Delfin's sign negative bilaterally  Musculoskeletal: no swollen joints and no trigger point or muscular tenderness      Labs:     Recent Labs     08/05/20  0510      K 4.1      CO2 23   BUN 31*   CREATININE 2.2*   GLUCOSE 109*   CALCIUM 8.3*       Lab Results   Component Value Date    HGB 10.7 08/05/2020    HCT 33.4 08/05/2020         Assessment:  Rachel Naval 76 y.o. female     Principal Problem:    Enteritis  Active Problems:    Essential hypertension    Acute kidney injury ([facility] [address]    Pancreatic lesion    Avascular necrosis of femoral head, left (HCC)    Diarrhea    Dehydration    CKD (chronic kidney disease) stage 3, GFR 30-59 ml/min ([facility])  Resolved Problems:    Chronic renal disease, stage II    Acute renal failure (HCC)      Azotemia resolving  Ultrasound report reviewed  Still small left renal lesion not clarified  However this is most likely a cyst  Will need contrast enhanced ultrasound as an outpatient    Plan:  As above no endoscopic plans are made at this time    Colt Killian MD   [facility]  Urology

## 2020-08-05 NOTE — PROGRESS NOTES
Subjective: The patient is awake and alert. No problems overnight. Denies chest pain, angina, and dyspnea. Denies abdominal pain. Tolerating diet. No nausea or vomiting. No diarrhea. Objective:    BP (!) 160/78   Pulse 70   Temp 97.8 °F (36.6 °C) (Oral)   Resp 16   Ht 5' 5\" (1.651 m)   Wt 156 lb (70.8 kg)   SpO2 96%   BMI 25.96 kg/m²     Current medications that patient is taking have been reviewed. Heart:  RRR, no murmurs, gallops, or rubs.   Lungs:  CTA bilaterally, no wheeze, rales or rhonchi  Abd: bowel sounds present, soft, nontender, nondistended, no masses  Extrem:  No cyanosis or edema    CBC with Differential:    Lab Results   Component Value Date    WBC 3.7 08/05/2020    RBC 4.10 08/05/2020    HGB 10.7 08/05/2020    HCT 33.4 08/05/2020     08/05/2020    MCV 81.5 08/05/2020    MCH 26.1 08/05/2020    MCHC 32.0 08/05/2020    RDW 14.0 08/05/2020    NRBC 0.0 08/04/2020    SEGSPCT 38 02/20/2014    LYMPHOPCT 37.9 08/05/2020    MONOPCT 7.6 08/05/2020    EOSPCT 2 10/08/2010    BASOPCT 0.5 08/05/2020    MONOSABS 0.28 08/05/2020    LYMPHSABS 1.39 08/05/2020    EOSABS 0.08 08/05/2020    BASOSABS 0.02 08/05/2020     CMP:    Lab Results   Component Value Date     08/05/2020    K 4.1 08/05/2020    K 2.9 08/03/2020     08/05/2020    CO2 23 08/05/2020    BUN 31 08/05/2020    CREATININE 2.2 08/05/2020    GFRAA 26 08/05/2020    LABGLOM 26 08/05/2020    GLUCOSE 109 08/05/2020    GLUCOSE 120 02/20/2012    PROT 5.8 08/05/2020    LABALBU 3.0 08/05/2020    LABALBU 3.8 02/20/2012    CALCIUM 8.3 08/05/2020    BILITOT 0.4 08/05/2020    ALKPHOS 51 08/05/2020    AST 19 08/05/2020    ALT 15 08/05/2020     BMP:    Lab Results   Component Value Date     08/05/2020    K 4.1 08/05/2020    K 2.9 08/03/2020     08/05/2020    CO2 23 08/05/2020    BUN 31 08/05/2020    LABALBU 3.0 08/05/2020    LABALBU 3.8 02/20/2012    CREATININE 2.2 08/05/2020    CALCIUM 8.3 08/05/2020    GFRAA 26 08/05/2020    LABGLOM 26 08/05/2020    GLUCOSE 109 08/05/2020    GLUCOSE 120 02/20/2012     Magnesium:    Lab Results   Component Value Date    MG 1.8 08/05/2020     Phosphorus:    Lab Results   Component Value Date    PHOS 2.9 08/05/2020     PT/INR:    Lab Results   Component Value Date    PROTIME 11.7 08/04/2020    INR 1.0 08/04/2020     PTT:    Lab Results   Component Value Date    APTT 26.5 08/04/2020   [APTT}     Assessment:    Patient Active Problem List   Diagnosis    Essential hypertension    Acute kidney injury ([facility] [address]    Pancreatic lesion    Avascular necrosis of femoral head, left (HCC)    Diarrhea    Enteritis    Dehydration    CKD (chronic kidney disease) stage 3, GFR 30-59 ml/min ([facility])       Plan:    Blood pressure ok, continue current medications  Secondary to dehydration. Cr improved with IV fluids. Appreciate urology evaluation of renal cysts. Appreciate GI evaluation. Appreciate ortho evaluation. Continue conservative therapy for OA. Most likely viral.  Improving. As above. IV fluids. Encourage orals. As above  Pt/Ot evaluations for discharge planning. Discharge soon.         Gio Solis    10:48 AM  8/5/2020

## 2020-08-05 NOTE — PROGRESS NOTES
PROGRESS NOTE  By Shirin Nascimento M.D. [facility]  Dr. Earl Phillips M.D.,  Dr. Chante Parker M.D.,   Dr. Cindi Granger DEFLICE.,  Dr. Phillip Cody M.D.,  Dr Montanez , Sabiha Baez  76 y.o.  female    SUBJECTIVE:  Denies abdominal pain. Diarrhea is resolved without any further diarrhea or bowel movements today. Denies nausea vomiting    OBJECTIVE:    BP (!) 160/78   Pulse 70   Temp 97.8 °F (36.6 °C) (Oral)   Resp 16   Ht 5' 5\" (1.651 m)   Wt 156 lb (70.8 kg)   SpO2 96%   BMI 25.96 kg/m²     General: NAD/-American female. HEENT: Anicteric sclera/moist oral mucosa  Neck: Supple/trachea midline  Chest: CTA B  Cor: Regular  Abd.: Soft and obese. Nontender  Extr.:  No significant peripheral edema  Skin: Warm and dry      DATA:    Monitor data reviewed - sinus rhythm noted. Lab Results   Component Value Date    WBC 3.7 08/05/2020    RBC 4.10 08/05/2020    HGB 10.7 08/05/2020    HCT 33.4 08/05/2020    MCV 81.5 08/05/2020    MCH 26.1 08/05/2020    MCHC 32.0 08/05/2020    RDW 14.0 08/05/2020     08/05/2020    MPV 10.7 08/05/2020     Lab Results   Component Value Date     08/05/2020    K 4.1 08/05/2020    K 2.9 08/03/2020     08/05/2020    CO2 23 08/05/2020    BUN 31 08/05/2020    CREATININE 2.2 08/05/2020    CALCIUM 8.3 08/05/2020    PROT 5.8 08/05/2020    LABALBU 3.0 08/05/2020    LABALBU 3.8 02/20/2012    BILITOT 0.4 08/05/2020    ALKPHOS 51 08/05/2020    AST 19 08/05/2020    ALT 15 08/05/2020     Lab Results   Component Value Date    LIPASE 44 08/03/2020     Lab Results   Component Value Date    AMYLASE 96 10/28/2014         ASSESSMENT/PLAN:    1. Pancreatic mass  -Further evaluation with contrasted MRI however if kidney function does not allow consider outpatient endoscopic ultrasound     2.   Diarrhea  -Infectious etiology cannot be excluded  -Abnormal stool culture with pending identification of GNR  -Continue empiric antibiotics  -Plan for nonemergent; likely outpatient colonoscopy     3. Anemia  -Acute diarrhea without evidence of overt bleed  -Suspect component of hemodilution  -Colonoscopy as above  -No iron deficiency, pending FOBT  -In case of precipitous drop in H&H/overt bleed, may consider further evaluation with upper endoscopy in the hospital  -PPI     4.  Acute on chronic renal failure  -SAW on CKD likely related to dehydration secondary to poor oral intake/diarrhea  -Significantly improved creatinine  -Nephrology input appreciated     NOTE:  This report was transcribed using voice recognition software. Every effort was made to ensure accuracy; however, inadvertent computerized transcription errors may be present.     Gita Alfaro MD  8/5/2020  4:58 PM

## 2020-08-06 LAB
AFP-TUMOR MARKER: 4 NG/ML (ref 0–9)
ALBUMIN SERPL-MCNC: 3 G/DL (ref 3.5–5.2)
ALP BLD-CCNC: 54 U/L (ref 35–104)
ALT SERPL-CCNC: 16 U/L (ref 0–32)
ANION GAP SERPL CALCULATED.3IONS-SCNC: 13 MMOL/L (ref 7–16)
AST SERPL-CCNC: 22 U/L (ref 0–31)
BASOPHILS ABSOLUTE: 0.02 E9/L (ref 0–0.2)
BASOPHILS RELATIVE PERCENT: 0.5 % (ref 0–2)
BILIRUB SERPL-MCNC: 0.6 MG/DL (ref 0–1.2)
BUN BLDV-MCNC: 19 MG/DL (ref 8–23)
CA 19-9: 6 U/ML (ref 0–37)
CALCIUM SERPL-MCNC: 8.2 MG/DL (ref 8.6–10.2)
CHLORIDE BLD-SCNC: 109 MMOL/L (ref 98–107)
CO2: 24 MMOL/L (ref 22–29)
CREAT SERPL-MCNC: 1.7 MG/DL (ref 0.5–1)
CULTURE, STOOL: NORMAL
EOSINOPHILS ABSOLUTE: 0.12 E9/L (ref 0.05–0.5)
EOSINOPHILS RELATIVE PERCENT: 2.9 % (ref 0–6)
GFR AFRICAN AMERICAN: 35
GFR NON-AFRICAN AMERICAN: 35 ML/MIN/1.73
GLUCOSE BLD-MCNC: 105 MG/DL (ref 74–99)
HAV IGM SER IA-ACNC: NORMAL
HCT VFR BLD CALC: 34.8 % (ref 34–48)
HEMOGLOBIN: 11.4 G/DL (ref 11.5–15.5)
HEPATITIS B CORE IGM ANTIBODY: NORMAL
HEPATITIS B SURFACE ANTIGEN INTERPRETATION: NORMAL
HEPATITIS C ANTIBODY INTERPRETATION: NORMAL
IMMATURE GRANULOCYTES #: 0.04 E9/L
IMMATURE GRANULOCYTES %: 1 % (ref 0–5)
LYMPHOCYTES ABSOLUTE: 1.81 E9/L (ref 1.5–4)
LYMPHOCYTES RELATIVE PERCENT: 44.5 % (ref 20–42)
MAGNESIUM: 1.5 MG/DL (ref 1.6–2.6)
MCH RBC QN AUTO: 26.9 PG (ref 26–35)
MCHC RBC AUTO-ENTMCNC: 32.8 % (ref 32–34.5)
MCV RBC AUTO: 82.1 FL (ref 80–99.9)
MONOCYTES ABSOLUTE: 0.27 E9/L (ref 0.1–0.95)
MONOCYTES RELATIVE PERCENT: 6.6 % (ref 2–12)
NEUTROPHILS ABSOLUTE: 1.81 E9/L (ref 1.8–7.3)
NEUTROPHILS RELATIVE PERCENT: 44.5 % (ref 43–80)
PDW BLD-RTO: 14.2 FL (ref 11.5–15)
PHOSPHORUS: 2.5 MG/DL (ref 2.5–4.5)
PLATELET # BLD: 253 E9/L (ref 130–450)
PMV BLD AUTO: 10.7 FL (ref 7–12)
POTASSIUM SERPL-SCNC: 3.6 MMOL/L (ref 3.5–5)
RBC # BLD: 4.24 E12/L (ref 3.5–5.5)
SODIUM BLD-SCNC: 146 MMOL/L (ref 132–146)
TOTAL PROTEIN: 6 G/DL (ref 6.4–8.3)
WBC # BLD: 4.1 E9/L (ref 4.5–11.5)

## 2020-08-06 PROCEDURE — 6370000000 HC RX 637 (ALT 250 FOR IP): Performed by: NURSE PRACTITIONER

## 2020-08-06 PROCEDURE — 83735 ASSAY OF MAGNESIUM: CPT

## 2020-08-06 PROCEDURE — 2580000003 HC RX 258: Performed by: INTERNAL MEDICINE

## 2020-08-06 PROCEDURE — 6360000002 HC RX W HCPCS: Performed by: INTERNAL MEDICINE

## 2020-08-06 PROCEDURE — 80053 COMPREHEN METABOLIC PANEL: CPT

## 2020-08-06 PROCEDURE — 36415 COLL VENOUS BLD VENIPUNCTURE: CPT

## 2020-08-06 PROCEDURE — 84100 ASSAY OF PHOSPHORUS: CPT

## 2020-08-06 PROCEDURE — 6360000002 HC RX W HCPCS: Performed by: NURSE PRACTITIONER

## 2020-08-06 PROCEDURE — 1200000000 HC SEMI PRIVATE

## 2020-08-06 PROCEDURE — 2580000003 HC RX 258: Performed by: NURSE PRACTITIONER

## 2020-08-06 PROCEDURE — 6370000000 HC RX 637 (ALT 250 FOR IP): Performed by: INTERNAL MEDICINE

## 2020-08-06 PROCEDURE — 85025 COMPLETE CBC W/AUTO DIFF WBC: CPT

## 2020-08-06 RX ORDER — CHOLESTYRAMINE 4 G/9G
1 POWDER, FOR SUSPENSION ORAL DAILY PRN
Status: DISCONTINUED | OUTPATIENT
Start: 2020-08-06 | End: 2020-08-07

## 2020-08-06 RX ORDER — HYDRALAZINE HYDROCHLORIDE 25 MG/1
25 TABLET, FILM COATED ORAL EVERY 8 HOURS SCHEDULED
Status: DISCONTINUED | OUTPATIENT
Start: 2020-08-06 | End: 2020-08-08

## 2020-08-06 RX ORDER — MAGNESIUM SULFATE IN WATER 40 MG/ML
2 INJECTION, SOLUTION INTRAVENOUS ONCE
Status: COMPLETED | OUTPATIENT
Start: 2020-08-06 | End: 2020-08-06

## 2020-08-06 RX ADMIN — OXYCODONE HYDROCHLORIDE 10 MG: 10 TABLET, FILM COATED, EXTENDED RELEASE ORAL at 11:14

## 2020-08-06 RX ADMIN — Medication 250 MG: at 17:16

## 2020-08-06 RX ADMIN — METRONIDAZOLE 500 MG: 500 TABLET, FILM COATED ORAL at 21:42

## 2020-08-06 RX ADMIN — Medication 10 ML: at 09:02

## 2020-08-06 RX ADMIN — Medication 250 MG: at 13:22

## 2020-08-06 RX ADMIN — VITAMIN D, TAB 1000IU (100/BT) 1000 UNITS: 25 TAB at 08:59

## 2020-08-06 RX ADMIN — HYDRALAZINE HYDROCHLORIDE 25 MG: 25 TABLET, FILM COATED ORAL at 15:57

## 2020-08-06 RX ADMIN — METRONIDAZOLE 500 MG: 500 TABLET, FILM COATED ORAL at 12:46

## 2020-08-06 RX ADMIN — ATORVASTATIN CALCIUM 10 MG: 10 TABLET, FILM COATED ORAL at 21:42

## 2020-08-06 RX ADMIN — CEFTRIAXONE 1 G: 1 INJECTION, POWDER, FOR SOLUTION INTRAMUSCULAR; INTRAVENOUS at 13:27

## 2020-08-06 RX ADMIN — HEPARIN SODIUM 5000 UNITS: 10000 INJECTION INTRAVENOUS; SUBCUTANEOUS at 15:58

## 2020-08-06 RX ADMIN — Medication 500 MG: at 08:59

## 2020-08-06 RX ADMIN — Medication 1 CAPSULE: at 08:59

## 2020-08-06 RX ADMIN — HEPARIN SODIUM 5000 UNITS: 10000 INJECTION INTRAVENOUS; SUBCUTANEOUS at 21:44

## 2020-08-06 RX ADMIN — LATANOPROST 1 DROP: 50 SOLUTION OPHTHALMIC at 21:44

## 2020-08-06 RX ADMIN — BRIMONIDINE TARTRATE 1 DROP: 2 SOLUTION OPHTHALMIC at 21:43

## 2020-08-06 RX ADMIN — CLOPIDOGREL 75 MG: 75 TABLET, FILM COATED ORAL at 09:01

## 2020-08-06 RX ADMIN — MULTIPLE VITAMINS W/ MINERALS TAB 1 TABLET: TAB at 08:59

## 2020-08-06 RX ADMIN — Medication 10 ML: at 21:45

## 2020-08-06 RX ADMIN — SODIUM CHLORIDE: 9 INJECTION, SOLUTION INTRAVENOUS at 04:00

## 2020-08-06 RX ADMIN — TIMOLOL MALEATE 1 DROP: 5 SOLUTION/ DROPS OPHTHALMIC at 21:43

## 2020-08-06 RX ADMIN — OXYCODONE HYDROCHLORIDE 10 MG: 10 TABLET, FILM COATED, EXTENDED RELEASE ORAL at 00:55

## 2020-08-06 RX ADMIN — HEPARIN SODIUM 5000 UNITS: 10000 INJECTION INTRAVENOUS; SUBCUTANEOUS at 06:23

## 2020-08-06 RX ADMIN — SODIUM CHLORIDE: 9 INJECTION, SOLUTION INTRAVENOUS at 17:18

## 2020-08-06 RX ADMIN — METRONIDAZOLE 500 MG: 500 TABLET, FILM COATED ORAL at 06:23

## 2020-08-06 RX ADMIN — BRIMONIDINE TARTRATE 1 DROP: 2 SOLUTION OPHTHALMIC at 09:00

## 2020-08-06 RX ADMIN — MAGNESIUM SULFATE HEPTAHYDRATE 2 G: 40 INJECTION, SOLUTION INTRAVENOUS at 11:15

## 2020-08-06 RX ADMIN — HYDRALAZINE HYDROCHLORIDE 25 MG: 25 TABLET, FILM COATED ORAL at 21:42

## 2020-08-06 RX ADMIN — TIMOLOL MALEATE 1 DROP: 5 SOLUTION/ DROPS OPHTHALMIC at 09:01

## 2020-08-06 ASSESSMENT — PAIN SCALES - GENERAL
PAINLEVEL_OUTOF10: 0
PAINLEVEL_OUTOF10: 6
PAINLEVEL_OUTOF10: 7
PAINLEVEL_OUTOF10: 0

## 2020-08-06 ASSESSMENT — PAIN DESCRIPTION - PROGRESSION: CLINICAL_PROGRESSION: NOT CHANGED

## 2020-08-06 NOTE — PROGRESS NOTES
CALCIUM 8.2 08/06/2020    GFRAA 35 08/06/2020    LABGLOM 35 08/06/2020    GLUCOSE 105 08/06/2020    GLUCOSE 120 02/20/2012     Magnesium:    Lab Results   Component Value Date    MG 1.5 08/06/2020     Phosphorus:    Lab Results   Component Value Date    PHOS 2.5 08/06/2020     PT/INR:    Lab Results   Component Value Date    PROTIME 11.7 08/04/2020    INR 1.0 08/04/2020     PTT:    Lab Results   Component Value Date    APTT 26.5 08/04/2020   [APTT}     Assessment:    Patient Active Problem List   Diagnosis    Essential hypertension    Acute kidney injury ([facility] [address]    Pancreatic lesion    Avascular necrosis of femoral head, left (HCC)    Diarrhea    Enteritis    Dehydration    CKD (chronic kidney disease) stage 3, GFR 30-59 ml/min ([facility])       Plan:    Blood pressure ok, continue current medications  Secondary to dehydration. Cr down to 1.7. Appreciate urology evaluation of renal cysts. Appreciate GI evaluation. Appreciate ortho evaluation. Continue conservative therapy for OA. Most likely viral. Back off diet. As above. IV fluids. Encourage orals. As above  Pt/Ot evaluations for discharge planning. Discharge soon.         Franca Velasquez    9:42 AM  8/6/2020

## 2020-08-06 NOTE — CARE COORDINATION
Pt showing improved renal function; pt/ot ordered; orhto saw pt, no intervention. HCTZ on hold. GI following for pancreatic mass; urology for renal mass. Plan for now is home, no needs, per pt. Will continue to monitor. Britney Rueda.

## 2020-08-06 NOTE — PLAN OF CARE
Problem: Falls - Risk of:  Goal: Will remain free from falls  Description: Will remain free from falls  8/6/2020 1721 by Annalee Yoder RN  Outcome: Met This Shift  8/6/2020 0512 by Hiren Diaz RN  Outcome: Met This Shift  8/6/2020 0509 by Hiren Diaz RN  Outcome: Met This Shift  Goal: Absence of physical injury  Description: Absence of physical injury  8/6/2020 0512 by Hiren Diaz RN  Outcome: Met This Shift  8/6/2020 0509 by Hiren Diaz RN  Outcome: Met This Shift

## 2020-08-06 NOTE — PROGRESS NOTES
Pt lexi she has had 2 loose bowel movements since she started on general. No further b.m. since states pt

## 2020-08-06 NOTE — PLAN OF CARE
Problem: Falls - Risk of:  Goal: Will remain free from falls  Description: Will remain free from falls  Outcome: Met This Shift  Goal: Absence of physical injury  Description: Absence of physical injury  Outcome: Met This Shift     Problem: Falls - Risk of:  Goal: Will remain free from falls  Description: Will remain free from falls  8/6/2020 0512 by Lori Babin RN  Outcome: Met This Shift  8/6/2020 0509 by Lori Babin RN  Outcome: Met This Shift  Goal: Absence of physical injury  Description: Absence of physical injury  8/6/2020 3174 by Lori Babin RN  Outcome: Met This Shift  8/6/2020 0509 by Lori Babin RN  Outcome: Met This Shift

## 2020-08-06 NOTE — PROGRESS NOTES
Nephrology Progress Note  Patient's Name: Jese Jordan  12:59 PM  2020    Nephrologist: Bita Cortés    Reason for Consult: SAW  Requesting Physician:  Deya Gutierrez MD    Chief Complaint:  Diarrhea    History Obtained From:  patient and past medical records    History of Present Ilness:    Jese Jordan is a 76 y.o. female with prior history of CKD G3 B baseline serum cr 1.4-1.5mg/dl with an e-GFR=41-44ml/min  who follows longitudinally in the office with Dr. Andry Caraballo. Pt presented to the ED with the c/o diarrhea for the last 3-4 days with associated malaise and body aches. She also noted dizziness and lightheadedness. She had a decreased appetite as well. In the ED the cr 5.4mg/dl with a K+ 2.9. She is maintained on losartan HCTZ as an outptShe had a Ct Scan which showed a 1.5cm exophytic lesion in the L kidney which was hyperdense and a 1.6cm lesion in the pancreas    20: Pt started a general diet yesterday and notes this brought back the diarrhea. She had 3 watery BM today    Past Medical History:   Diagnosis Date    Chronic renal disease, stage II 2012    Hypertension     Rhabdomyolysis 1/15/2013       Past Surgical History:   Procedure Laterality Date    BACK SURGERY       SECTION      X2    EYE SURGERY      left eye-4 or 5 surgeries, detatched retina x2, has a buckle in her left eye    HYSTERECTOMY      KNEE SURGERY      left knee    ROTATOR CUFF REPAIR      2017 right shoulder    TUBAL LIGATION         Family History   Problem Relation Age of Onset    Heart Disease Mother     Cancer Mother         reports that she has quit smoking. Her smoking use included cigarettes. She quit after 5.00 years of use. She has never used smokeless tobacco. She reports current alcohol use. She reports that she does not use drugs.     Allergies:  Nalbuphine and Vistaril [hydroxyzine hcl]    Current Medications:    magnesium sulfate 2 g in 50 mL IVPB premix, Once  cefTRIAXone (ROCEPHIN) 1 g in 18  SpO2: 100 %    Skin: no rash, turgor wnl  Heent:  eomi, mmm  Neck: no bruits or jvd noted  Cardiovascular: PMI not lat displaced   S1, S2 without S3 or a rub  Respiratory: CTA B without w/r/r  Abdomen:  +bs, soft, nt, nd  Ext: (-) bilat  lower extremity edema  Psychiatric: mood and affect appropriate, cr nr 2-12 grossly intact  Musculoskeletal:  Rom, muscular strength intact    Data:   Labs:  CBC:   Lab Results   Component Value Date    WBC 4.1 08/06/2020    RBC 4.24 08/06/2020    HGB 11.4 08/06/2020    HCT 34.8 08/06/2020    MCV 82.1 08/06/2020    MCH 26.9 08/06/2020    MCHC 32.8 08/06/2020    RDW 14.2 08/06/2020     08/06/2020    MPV 10.7 08/06/2020     CBC with Differential:    Lab Results   Component Value Date    WBC 4.1 08/06/2020    RBC 4.24 08/06/2020    HGB 11.4 08/06/2020    HCT 34.8 08/06/2020     08/06/2020    MCV 82.1 08/06/2020    MCH 26.9 08/06/2020    MCHC 32.8 08/06/2020    RDW 14.2 08/06/2020    NRBC 0.0 08/04/2020    SEGSPCT 38 02/20/2014    LYMPHOPCT 44.5 08/06/2020    MONOPCT 6.6 08/06/2020    EOSPCT 2 10/08/2010    BASOPCT 0.5 08/06/2020    MONOSABS 0.27 08/06/2020    LYMPHSABS 1.81 08/06/2020    EOSABS 0.12 08/06/2020    BASOSABS 0.02 08/06/2020     Hemoglobin/Hematocrit:    Lab Results   Component Value Date    HGB 11.4 08/06/2020    HCT 34.8 08/06/2020     CMP:    Lab Results   Component Value Date     08/06/2020    K 3.6 08/06/2020    K 2.9 08/03/2020     08/06/2020    CO2 24 08/06/2020    BUN 19 08/06/2020    CREATININE 1.7 08/06/2020    GFRAA 35 08/06/2020    LABGLOM 35 08/06/2020    GLUCOSE 105 08/06/2020    GLUCOSE 120 02/20/2012    PROT 6.0 08/06/2020    LABALBU 3.0 08/06/2020    LABALBU 3.8 02/20/2012    CALCIUM 8.2 08/06/2020    BILITOT 0.6 08/06/2020    ALKPHOS 54 08/06/2020    AST 22 08/06/2020    ALT 16 08/06/2020     BMP:    Lab Results   Component Value Date     08/06/2020    K 3.6 08/06/2020    K 2.9 08/03/2020     08/06/2020    CO2 24 O2SAT  HgBA1c:    Lab Results   Component Value Date    LABA1C 6.4 2020     Microalbumen/Creatinine ratio:  No components found for: RUCREAT  FLP:    Lab Results   Component Value Date    TRIG 314 2020    HDL 16 2020    LDLCALC 114 2020    LABVLDL 63 2020     TSH:    Lab Results   Component Value Date    TSH 4.420 2014     VITAMIN B12: No components found for: B12  FOLATE:    Lab Results   Component Value Date    FOLATE 12.0 2020     Iron Saturation:  No components found for: PERCENTFE  FERRITIN:    Lab Results   Component Value Date    FERRITIN 627 2020     AMYLASE:    Lab Results   Component Value Date    AMYLASE 96 10/28/2014     LIPASE:    Lab Results   Component Value Date    LIPASE 44 2020     Fibrinogen Level:  No components found for: FIB  24 Hour Urine for Protein:  No components found for: RAWUPRO, UHRS3, ASVP85IN, UTV3  24 Hour Urine for Creatinine Clearance:  No components found for: Nessa Screws, UTV10     Imaging:  Patient MRN: [id number]    : 1945    Age:  74 years    Gender: Female    Order Date: 2020 6:35 AM    Exam: XR CHEST PORTABLE    Number of Images: 1 view    Indication:   Cough    Cough    Comparison: 2018         FINDINGS:         Heart and pulmonary vascularity normal. Lungs clear. Costophrenic    angles sharp. Normal aorta.                   Impression    No acute cardiopulmonary findings. Patient MRN:  [id number]    : 1945    Age: 76 years    Gender: Female    Order Date:  8/3/2020 4:36 PM    EXAM: CT ABDOMEN PELVIS WO CONTRAST    INDICATION:  acute kidney injury    acute kidney injury      COMPARISON: CT the abdomen and pelvis, 2010         Technique: Low-dose CT  acquisition technique included one of    following options; 1 . Automated exposure control, 2. Adjustment of MA    and or KV according to patient's size or 3.  Use of iterative    reconstruction.         Multiple computerized tomography sections of the abdomen and pelvis    with sagittal and coronal MPR reconstructions were obtained from the    top of the diaphragm to the pelvis.                FINDINGS:         LOWER THORAX: Unremarkable. LIVER: Unremarkable. GALLBLADDER:Unremarkable    SPLEEN:Unremarkable. ADRENALS:Unremarkable. KIDNEYS: The kidneys are normal in size. No stone or hydronephrosis is    seen. Multiple small lesions are seen in the kidneys, which may    represent cysts. A 1.5 cm exophytic hyperdense lesion along the upper    pole of the left kidney may represent a solid lesion. Evaluation with sonography is recommended. PANCREAS: 1.6 cm hypodense lesion is seen in the region of the    uncinate process. It was not visualized on the prior CT from 2010    BOWEL: No bowel wall thickening or obstruction. APPENDIX:Unremarkable. BLADDER:Unremarkable. REPRODUCTIVE ORGANS: Status post hysterectomy. VASCULATURE:Mild calcified atherosclerosis seen in the aorta. No    aneurysm. LYMPH NODES:Unremarkable. BONES:Evaluation of the bones reveals no fracture or joint    dislocation. Prominent sclerosis along the left femoral head likely    due to avascular necrosis. Severe loss of disc height at L5-S1. Jonathon Joellen MISCELLANEOUS:No additional finding.              Impression              1. No acute abnormality seen in the abdomen or the pelvis. 2. Bilateral renal lesions, including a 1.6 cm hyperdense lesion on    the left.      3. 1.6 and a hypodense lesion in the uncinate process of the pancreas. 4.. Postcontrast enhanced MRI of the abdomen is recommended to    evaluate both the renal and pancreatic lesions.       US RETROPERITONEAL COMPLETE [[id number]]      Resulted: 20     Updated: 20     Narrative:      Patient Mrn: [id number]   : 1945   Age:  68 years   Gender: Female   Order Date: 2020 4:21 PM     Exam: US RETROPERITONEAL COMPLETE     Number Of Images: 46 Views Indication:  Bilateral renal probable cysts reported by CT Abdomen and   Pelvis noncontrast imaging August 3, 2020     Comparison: Noncontrast CT abdomen and pelvis study August 3, 2020     TECHNIQUE: 2-D grayscale and color Doppler imaging were utilized for   evaluation of the kidneys and bladder. Findings: The right kidney [id number] x 4.3 x 3.7 cm, and the left   kidney measures 9.3 x 4.5 x 5.0 cm. There is no evidence of collecting system calcification, obstructive   dilation, or perinephric inflammatory change. Cortical echogenicity is   increased bilaterally, with some cortical thinning on the right,   suggesting the spectrum of medical renal disease. Cysts in the upper   pole and mid right kidney measured 2.2 and 1.2 cm diameter,   respectively. . An upper pole cyst of the left kidney measures 1.8 cm   maximum diameter. Central perfusion is intact in both kidneys by color   Doppler imaging. Images of the bladder show an estimated volume of 314 mL. The post   void residual was not measured. Bladder mural contours appear normal,   and no intraluminal abnormalities are evident. Impression:      Both kidneys show some increased cortical echogenicity suggesting the   spectrum of medical renal disease. Multifocal bilateral cysts, with   smaller bilateral cysts not enumerated above, right common finding in   patients with medical renal disease. Retrospective review of the CT   study from August 3, 2020 suggests that the posterior mid left renal   echogenic finding is likely a small hemorrhagic cyst. On this   examination, several of the anechoic cystic structures do not show   good through transmission/shadowing, which may be artifact related to   habitus and depth of the cysts from the skin surface. If further   evaluation is felt to be clinically necessary on the basis of   hematuria or other clinical concerns, MRI can identify and   differentiate solid from cystic structures.      There is no evidence of obstructive dilation, collecting system   calcification, or perinephric inflammatory change on either side. Perfusion appears intact bilaterally. The bladder is normal in   appearance. Assessment  1-Stage III SAW sec to decreased effective renal perfusion  as evidenced by the FeNa <1%-due to the diarrhea with the decreased po intake as well as the ARB+HCTZ  UA (-) except for 25mg/dl protein (-) blood  Cr trending down 5.4-->2.2-->1.7mg/dl  PLAN:1. Continue to monitor on IVF  2. Keep off the ARB/HCTZ    2-Complex L Renal Cyst  PLAN:1. 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 Await SPEP and UPEP    4- Sec HPTH of Renal Origin  PO4 WNL  , Vit D 56  PLAN:1. Follow Ca++ and PO4    5- HTN with CKD I-IV  BP above goal <130/80  PLAN:1. Continue to hold the ARB and HCTZ  2. Will use po hydralazine until cr back to baseline    6- CKD G3 B baseline serum cr 1.4-1.5mg/dl with an e-GFR=41-44ml/min presumed sec to microvascular disease    6.  Diarrhea-Stool Culture with Gram (-) salma      Thank you  for allowing us to participate in care of Armand Bacon  12:59 PM  8/6/2020

## 2020-08-06 NOTE — PROGRESS NOTES
PROGRESS NOTE  By Obdulio Day M.D. [facility]  Dr. Rivas Olson M.D.,  Dr. Angelo Edwards M.D.,   Dr. Kirstin Saenz D.O.,  Dr. Vickie Walker M.D.,  Dr Yessi Romo, Tl Patrick  76 y.o.  female    SUBJECTIVE:  Overall reports not feeling well but denies any significant abdominal pain per se. Patient reports 3-4 bowel movements which are watery. Denies noticing any blood in the stool or melena. OBJECTIVE:    BP (!) 170/72   Pulse 64   Temp 98 °F (36.7 °C) (Oral)   Resp 18   Ht 5' 5\" (1.651 m)   Wt 160 lb (72.6 kg)   SpO2 100%   BMI 26.63 kg/m²     General: NAD/-American female  HEENT: Anicteric sclera/moist oral mucosa  Neck: Supple with trachea midline  Chest: Symmetric excursion/nonlabored respirations  Abd.: Soft and mildly obese. Nontender.   BS +  Extr.:  No peripheral edema  Skin: Warm and dry        DATA:         Lab Results   Component Value Date    WBC 4.1 08/06/2020    RBC 4.24 08/06/2020    HGB 11.4 08/06/2020    HCT 34.8 08/06/2020    MCV 82.1 08/06/2020    MCH 26.9 08/06/2020    MCHC 32.8 08/06/2020    RDW 14.2 08/06/2020     08/06/2020    MPV 10.7 08/06/2020     Lab Results   Component Value Date     08/06/2020    K 3.6 08/06/2020    K 2.9 08/03/2020     08/06/2020    CO2 24 08/06/2020    BUN 19 08/06/2020    CREATININE 1.7 08/06/2020    CALCIUM 8.2 08/06/2020    PROT 6.0 08/06/2020    LABALBU 3.0 08/06/2020    LABALBU 3.8 02/20/2012    BILITOT 0.6 08/06/2020    ALKPHOS 54 08/06/2020    AST 22 08/06/2020    ALT 16 08/06/2020     Lab Results   Component Value Date    LIPASE 44 08/03/2020     Lab Results   Component Value Date    AMYLASE 96 10/28/2014         ASSESSMENT/PLAN:  1.  Pancreatic mass  -Further evaluation with contrasted MRI however if kidney function does not allow consider outpatient endoscopic ultrasound  -Nonelevated CA 19-9     2.  Diarrhea  -Return of diarrhea with general diet  -Stool culture negative  -Stool

## 2020-08-07 ENCOUNTER — APPOINTMENT (OUTPATIENT)
Dept: GENERAL RADIOLOGY | Age: 75
DRG: 683 | End: 2020-08-07
Attending: INTERNAL MEDICINE
Payer: MEDICARE

## 2020-08-07 LAB
ADDENDUM ELECTROPHORESIS URINE RANDOM: NORMAL
ALBUMIN SERPL-MCNC: 2.5 G/DL (ref 3.5–4.7)
ALBUMIN SERPL-MCNC: 3.2 G/DL (ref 3.5–5.2)
ALP BLD-CCNC: 56 U/L (ref 35–104)
ALPHA-1-GLOBULIN: 0.3 G/DL (ref 0.2–0.4)
ALPHA-2-GLOBULIN: 1 G/FL (ref 0.5–1)
ALT SERPL-CCNC: 27 U/L (ref 0–32)
ANION GAP SERPL CALCULATED.3IONS-SCNC: 15 MMOL/L (ref 7–16)
AST SERPL-CCNC: 71 U/L (ref 0–31)
BASOPHILS ABSOLUTE: 0.02 E9/L (ref 0–0.2)
BASOPHILS RELATIVE PERCENT: 0.4 % (ref 0–2)
BETA GLOBULIN: 0.9 G/DL (ref 0.8–1.3)
BILIRUB SERPL-MCNC: 0.4 MG/DL (ref 0–1.2)
BUN BLDV-MCNC: 9 MG/DL (ref 8–23)
CA 19-9: 6 U/ML (ref 0–37)
CALCIUM SERPL-MCNC: 8.4 MG/DL (ref 8.6–10.2)
CALPROTECTIN: 44 UG/G
CHLORIDE BLD-SCNC: 107 MMOL/L (ref 98–107)
CO2: 24 MMOL/L (ref 22–29)
CREAT SERPL-MCNC: 1.4 MG/DL (ref 0.5–1)
ELECTROPHORESIS: ABNORMAL
EOSINOPHILS ABSOLUTE: 0.12 E9/L (ref 0.05–0.5)
EOSINOPHILS RELATIVE PERCENT: 2.3 % (ref 0–6)
GAMMA GLOBULIN: 0.8 G/DL (ref 0.7–1.6)
GFR AFRICAN AMERICAN: 44
GFR NON-AFRICAN AMERICAN: 44 ML/MIN/1.73
GLUCOSE BLD-MCNC: 115 MG/DL (ref 74–99)
HCT VFR BLD CALC: 34.3 % (ref 34–48)
HEMOGLOBIN: 11.3 G/DL (ref 11.5–15.5)
IMMATURE GRANULOCYTES #: 0.04 E9/L
IMMATURE GRANULOCYTES %: 0.8 % (ref 0–5)
IMMUNOFIXATION URINE: NORMAL
LYMPHOCYTES ABSOLUTE: 1.85 E9/L (ref 1.5–4)
LYMPHOCYTES RELATIVE PERCENT: 35.9 % (ref 20–42)
MAGNESIUM: 1.4 MG/DL (ref 1.6–2.6)
MCH RBC QN AUTO: 26.3 PG (ref 26–35)
MCHC RBC AUTO-ENTMCNC: 32.9 % (ref 32–34.5)
MCV RBC AUTO: 80 FL (ref 80–99.9)
MONOCYTES ABSOLUTE: 0.47 E9/L (ref 0.1–0.95)
MONOCYTES RELATIVE PERCENT: 9.1 % (ref 2–12)
NEUTROPHILS ABSOLUTE: 2.65 E9/L (ref 1.8–7.3)
NEUTROPHILS RELATIVE PERCENT: 51.5 % (ref 43–80)
OCCULT BLOOD DIAGNOSTIC: NORMAL
PDW BLD-RTO: 13.9 FL (ref 11.5–15)
PHOSPHORUS: 2.1 MG/DL (ref 2.5–4.5)
PLATELET # BLD: 279 E9/L (ref 130–450)
PMV BLD AUTO: 10.6 FL (ref 7–12)
POTASSIUM SERPL-SCNC: 3.5 MMOL/L (ref 3.5–5)
RBC # BLD: 4.29 E12/L (ref 3.5–5.5)
SODIUM BLD-SCNC: 146 MMOL/L (ref 132–146)
TOTAL PROTEIN: 6 G/DL (ref 6.4–8.3)
WBC # BLD: 5.2 E9/L (ref 4.5–11.5)

## 2020-08-07 PROCEDURE — 6360000002 HC RX W HCPCS: Performed by: INTERNAL MEDICINE

## 2020-08-07 PROCEDURE — 83735 ASSAY OF MAGNESIUM: CPT

## 2020-08-07 PROCEDURE — 80053 COMPREHEN METABOLIC PANEL: CPT

## 2020-08-07 PROCEDURE — 6370000000 HC RX 637 (ALT 250 FOR IP): Performed by: HOSPITALIST

## 2020-08-07 PROCEDURE — 85025 COMPLETE CBC W/AUTO DIFF WBC: CPT

## 2020-08-07 PROCEDURE — 2580000003 HC RX 258: Performed by: INTERNAL MEDICINE

## 2020-08-07 PROCEDURE — 2500000003 HC RX 250 WO HCPCS: Performed by: INTERNAL MEDICINE

## 2020-08-07 PROCEDURE — 1200000000 HC SEMI PRIVATE

## 2020-08-07 PROCEDURE — 84100 ASSAY OF PHOSPHORUS: CPT

## 2020-08-07 PROCEDURE — 74018 RADEX ABDOMEN 1 VIEW: CPT

## 2020-08-07 PROCEDURE — 6370000000 HC RX 637 (ALT 250 FOR IP): Performed by: INTERNAL MEDICINE

## 2020-08-07 PROCEDURE — 2580000003 HC RX 258: Performed by: NURSE PRACTITIONER

## 2020-08-07 PROCEDURE — 6360000002 HC RX W HCPCS: Performed by: NURSE PRACTITIONER

## 2020-08-07 PROCEDURE — 6370000000 HC RX 637 (ALT 250 FOR IP): Performed by: NURSE PRACTITIONER

## 2020-08-07 PROCEDURE — 36415 COLL VENOUS BLD VENIPUNCTURE: CPT

## 2020-08-07 RX ORDER — CHOLESTYRAMINE 4 G/9G
1 POWDER, FOR SUSPENSION ORAL 2 TIMES DAILY PRN
Status: DISCONTINUED | OUTPATIENT
Start: 2020-08-07 | End: 2020-08-11 | Stop reason: HOSPADM

## 2020-08-07 RX ORDER — MAGNESIUM SULFATE IN WATER 40 MG/ML
2 INJECTION, SOLUTION INTRAVENOUS ONCE
Status: COMPLETED | OUTPATIENT
Start: 2020-08-07 | End: 2020-08-07

## 2020-08-07 RX ORDER — LOSARTAN POTASSIUM 50 MG/1
50 TABLET ORAL DAILY
Status: DISCONTINUED | OUTPATIENT
Start: 2020-08-07 | End: 2020-08-11 | Stop reason: HOSPADM

## 2020-08-07 RX ADMIN — CEFTRIAXONE 1 G: 1 INJECTION, POWDER, FOR SOLUTION INTRAMUSCULAR; INTRAVENOUS at 12:47

## 2020-08-07 RX ADMIN — Medication 1 CAPSULE: at 09:32

## 2020-08-07 RX ADMIN — LATANOPROST 1 DROP: 50 SOLUTION OPHTHALMIC at 21:13

## 2020-08-07 RX ADMIN — HEPARIN SODIUM 5000 UNITS: 10000 INJECTION INTRAVENOUS; SUBCUTANEOUS at 05:22

## 2020-08-07 RX ADMIN — HYDRALAZINE HYDROCHLORIDE 25 MG: 25 TABLET, FILM COATED ORAL at 21:14

## 2020-08-07 RX ADMIN — CLOPIDOGREL 75 MG: 75 TABLET, FILM COATED ORAL at 09:32

## 2020-08-07 RX ADMIN — OXYCODONE HYDROCHLORIDE 10 MG: 10 TABLET, FILM COATED, EXTENDED RELEASE ORAL at 00:54

## 2020-08-07 RX ADMIN — Medication 250 MG: at 00:54

## 2020-08-07 RX ADMIN — TIMOLOL MALEATE 1 DROP: 5 SOLUTION/ DROPS OPHTHALMIC at 21:13

## 2020-08-07 RX ADMIN — HYDRALAZINE HYDROCHLORIDE 25 MG: 25 TABLET, FILM COATED ORAL at 05:22

## 2020-08-07 RX ADMIN — MAGNESIUM SULFATE HEPTAHYDRATE 2 G: 40 INJECTION, SOLUTION INTRAVENOUS at 14:14

## 2020-08-07 RX ADMIN — Medication 10 ML: at 09:33

## 2020-08-07 RX ADMIN — HYDRALAZINE HYDROCHLORIDE 25 MG: 25 TABLET, FILM COATED ORAL at 14:11

## 2020-08-07 RX ADMIN — CHOLESTYRAMINE 4 G: 4 POWDER, FOR SUSPENSION ORAL at 14:58

## 2020-08-07 RX ADMIN — HEPARIN SODIUM 5000 UNITS: 10000 INJECTION INTRAVENOUS; SUBCUTANEOUS at 14:13

## 2020-08-07 RX ADMIN — OXYCODONE HYDROCHLORIDE 10 MG: 10 TABLET, FILM COATED, EXTENDED RELEASE ORAL at 12:45

## 2020-08-07 RX ADMIN — LOSARTAN POTASSIUM 50 MG: 50 TABLET ORAL at 14:11

## 2020-08-07 RX ADMIN — METRONIDAZOLE 500 MG: 500 TABLET, FILM COATED ORAL at 21:13

## 2020-08-07 RX ADMIN — METRONIDAZOLE 500 MG: 500 TABLET, FILM COATED ORAL at 05:22

## 2020-08-07 RX ADMIN — TIMOLOL MALEATE 1 DROP: 5 SOLUTION/ DROPS OPHTHALMIC at 09:32

## 2020-08-07 RX ADMIN — MULTIPLE VITAMINS W/ MINERALS TAB 1 TABLET: TAB at 09:32

## 2020-08-07 RX ADMIN — ATORVASTATIN CALCIUM 10 MG: 10 TABLET, FILM COATED ORAL at 21:13

## 2020-08-07 RX ADMIN — BRIMONIDINE TARTRATE 1 DROP: 2 SOLUTION OPHTHALMIC at 21:13

## 2020-08-07 RX ADMIN — METRONIDAZOLE 500 MG: 500 TABLET, FILM COATED ORAL at 14:11

## 2020-08-07 RX ADMIN — SODIUM CHLORIDE: 9 INJECTION, SOLUTION INTRAVENOUS at 03:00

## 2020-08-07 RX ADMIN — BRIMONIDINE TARTRATE 1 DROP: 2 SOLUTION OPHTHALMIC at 09:33

## 2020-08-07 RX ADMIN — Medication 250 MG: at 05:30

## 2020-08-07 RX ADMIN — Medication 250 MG: at 12:45

## 2020-08-07 RX ADMIN — POTASSIUM PHOSPHATE, MONOBASIC AND POTASSIUM PHOSPHATE, DIBASIC 20 MMOL: 224; 236 INJECTION, SOLUTION, CONCENTRATE INTRAVENOUS at 14:19

## 2020-08-07 RX ADMIN — Medication 250 MG: at 18:00

## 2020-08-07 RX ADMIN — Medication 500 MG: at 09:33

## 2020-08-07 RX ADMIN — HEPARIN SODIUM 5000 UNITS: 10000 INJECTION INTRAVENOUS; SUBCUTANEOUS at 21:14

## 2020-08-07 ASSESSMENT — PAIN SCALES - GENERAL
PAINLEVEL_OUTOF10: 1
PAINLEVEL_OUTOF10: 0
PAINLEVEL_OUTOF10: 0
PAINLEVEL_OUTOF10: 5

## 2020-08-07 ASSESSMENT — PAIN DESCRIPTION - PROGRESSION: CLINICAL_PROGRESSION: NOT CHANGED

## 2020-08-07 NOTE — CARE COORDINATION
Pt still with some diarrhea; c-diff pended; other stool cx neg so far. Bun /cr much improved; needs US to further evaluate pancreatic mass. Plan is home  At discharge, no needs. Aguilar Caldwell.

## 2020-08-07 NOTE — PROGRESS NOTES
PROGRESS NOTE  By Sindi Dunlap M.D. [facility]  Dr. London Jacobs M.D.,  Dr. Alexander Rascon M.D.,   Dr. Loren Peres D.O.,  Dr. Martina Flores M.D.,  Dr Melody Cohn, [place]  76 y.o.  female    SUBJECTIVE:  Denies abdominal pain. Denies nausea vomiting. Poor appetite. Patient reports several liquid bowel movements. Discussed with nursing -5-6 bowel movements today. OBJECTIVE:    BP (!) 170/88   Pulse 66   Temp 98.6 °F (37 °C) (Oral)   Resp 18   Ht 5' 5\" (1.651 m)   Wt 162 lb 6.4 oz (73.7 kg)   SpO2 100%   BMI 27.02 kg/m²     General: NAD/-American female  HEENT: Anicteric sclera/moist oral mucosa  Neck: Supple/trachea midline  Chest: Symmetrical excursion/nonlabored respirations  Cor: Regular  Abd.: Soft and obese. Nontender. No rebound tenderness or guarding. BS +  Extr.:  No peripheral edema  Skin: Warm and dry      DATA:    Monitor data reviewed -sinus rhythm noted.        Lab Results   Component Value Date    WBC 5.2 08/07/2020    RBC 4.29 08/07/2020    HGB 11.3 08/07/2020    HCT 34.3 08/07/2020    MCV 80.0 08/07/2020    MCH 26.3 08/07/2020    MCHC 32.9 08/07/2020    RDW 13.9 08/07/2020     08/07/2020    MPV 10.6 08/07/2020     Lab Results   Component Value Date     08/07/2020    K 3.5 08/07/2020    K 2.9 08/03/2020     08/07/2020    CO2 24 08/07/2020    BUN 9 08/07/2020    CREATININE 1.4 08/07/2020    CALCIUM 8.4 08/07/2020    PROT 6.0 08/07/2020    LABALBU 3.2 08/07/2020    LABALBU 3.8 02/20/2012    BILITOT 0.4 08/07/2020    ALKPHOS 56 08/07/2020    AST 71 08/07/2020    ALT 27 08/07/2020     Lab Results   Component Value Date    LIPASE 44 08/03/2020     Lab Results   Component Value Date    AMYLASE 96 10/28/2014         ASSESSMENT/PLAN:  1.  Pancreatic mass  -Further evaluation with contrasted MRI however if kidney function does not allow consider outpatient endoscopic ultrasound  -Nonelevated CA 19-9     2.  Diarrhea  -Return of diarrhea with general diet -persistent diarrhea despite clear liquid diet  -Stool culture negative  -Stool studies negative thus far with C. difficile pending (apparently not collected secondary to stool being mixed with urine  -Continue empiric antibiotics  -Adjusted diet to lactose controlled  -Added cholestyramine  -Plan for nonemergent; likely outpatient colonoscopy  -Consider antimotility agent such as Imodium if C. difficile negative, however without definitive result on the C. difficile testing renal failure recommend antimotility agent  -Obtain KUB though benign abdominal exam     3.  Anemia  -Acute without evidence of overt bleed  -Stable H&H after initial drop  -Suspect component of hemodilution  -Colonoscopy as above  -No iron deficiency; unremarkable RBC indices  -Positive FOBT  -In case of precipitous drop in H&H/overt bleed, may consider further evaluation with upper endoscopy in the hospital, otherwise plan for outpatient procedure  -PPI     4.  Acute on chronic renal failure  -SAW on CKD likely related to dehydration secondary to poor oral intake/diarrhea  - persistently improving creatinine  -Nephrology input appreciated    NOTE:  This report was transcribed using voice recognition software. Every effort was made to ensure accuracy; however, inadvertent computerized transcription errors may be present.     Obdulio Day MD  8/7/2020  2:57 PM

## 2020-08-07 NOTE — PATIENT CARE CONFERENCE
[facility] Quality Flow/Interdisciplinary Rounds Progress Note        Quality Flow Rounds held on August 7, 2020    Disciplines Attending:  Bedside Nurse, ,  and Nursing Unit Leadership    Tristian Landeros was admitted on 8/4/2020 12:05 AM    Anticipated Discharge Date:  Expected Discharge Date: 08/06/20    Disposition:    Abel Score:  Abel Scale Score: 21    Readmission Score:         Discussed patient goal for the day, patient clinical progression, and barriers to discharge.   The following Goal(s) of the Day/Commitment(s) have been identified:  PT/OT jaclyn Ramos  August 7, 2020

## 2020-08-07 NOTE — PROGRESS NOTES
Physical Therapy    Facility/Department: [address] MED SURG/TELE  Initial Assessment    NAME: Swathi Medina  : 1945  MRN: [id number]    Date of Service: 2020    Physical therapy orders received/treatment attempted and chart review completed. Patient refused due to not feeling well and not able to be seen. Will attempt at a later time/date as able. Thank you for the opportunity to assist in the care of this patient.     Peewee Johnson, PT, DPT  License [id number]

## 2020-08-07 NOTE — PROGRESS NOTES
Occupational Therapy  Date:8/7/2020  Patient Name: Abel Ramsay  Room: 5611/3099-O     Occupational Therapy (OT) order received, patient's medical record reviewed, and OT evalaution attempted this date; patient declined to participate in [address] [place] [address] activities. OT evaluation to be re-attempted at later date, as able/appropriate. Mala Mao, OTR/L  License Number: [id number]

## 2020-08-07 NOTE — PROGRESS NOTES
Nephrology Progress Note  Patient's Name: Rupali Tejada  12:53 PM  2020    Nephrologist: Sebastián Nelson    Reason for Consult: SAW  Requesting Physician:  Gudelia Guo MD    Chief Complaint:  Diarrhea    History Obtained From:  patient and past medical records    History of Present Ilness:    Rupali Tejada is a 76 y.o. female with prior history of CKD G3 B baseline serum cr 1.4-1.5mg/dl with an e-GFR=41-44ml/min  who follows longitudinally in the office with Dr. Cirilo Tapia. Pt presented to the ED with the c/o diarrhea for the last 3-4 days with associated malaise and body aches. She also noted dizziness and lightheadedness. She had a decreased appetite as well. In the ED the cr 5.4mg/dl with a K+ 2.9. She is maintained on losartan HCTZ as an outptShe had a Ct Scan which showed a 1.5cm exophytic lesion in the L kidney which was hyperdense and a 1.6cm lesion in the pancreas    20: Pt states she still feels poorly and she notes her stools feel greasy. No abd pain    Past Medical History:   Diagnosis Date    Chronic renal disease, stage II 2012    Hypertension     Rhabdomyolysis 1/15/2013       Past Surgical History:   Procedure Laterality Date    BACK SURGERY       SECTION      X2    EYE SURGERY      left eye-4 or 5 surgeries, detatched retina x2, has a buckle in her left eye    HYSTERECTOMY      KNEE SURGERY      left knee    ROTATOR CUFF REPAIR      2017 right shoulder    TUBAL LIGATION         Family History   Problem Relation Age of Onset    Heart Disease Mother     Cancer Mother         reports that she has quit smoking. Her smoking use included cigarettes. She quit after 5.00 years of use. She has never used smokeless tobacco. She reports current alcohol use. She reports that she does not use drugs.     Allergies:  Nalbuphine and Vistaril [hydroxyzine hcl]    Current Medications:    cefTRIAXone (ROCEPHIN) 1 g in sterile water 10 mL IV syringe, Q24H  vancomycin (VANCOCIN) oral solution 250 mg, 4 times per day  hydrALAZINE (APRESOLINE) tablet 25 mg, 3 times per day  cholestyramine (QUESTRAN) packet 4 g, Daily PRN  metroNIDAZOLE (FLAGYL) tablet 500 mg, 3 times per day  ALPRAZolam (XANAX) tablet 1 mg, TID PRN  ascorbic acid (VITAMIN C) tablet 500 mg, Daily  clopidogrel (PLAVIX) tablet 75 mg, Daily  latanoprost (XALATAN) 0.005 % ophthalmic solution 1 drop, Nightly  therapeutic multivitamin-minerals 1 tablet, Daily  vitamin D (CHOLECALCIFEROL) tablet 1,000 Units, Every Other Day  oxyCODONE (OXYCONTIN) extended release tablet 10 mg, 2 times per day  0.9 % sodium chloride infusion, Continuous  sodium chloride flush 0.9 % injection 10 mL, 2 times per day  sodium chloride flush 0.9 % injection 10 mL, PRN  acetaminophen (TYLENOL) tablet 650 mg, Q6H PRN    Or  acetaminophen (TYLENOL) suppository 650 mg, Q6H PRN  polyethylene glycol (GLYCOLAX) packet 17 g, Daily PRN  promethazine (PHENERGAN) tablet 12.5 mg, Q6H PRN    Or  ondansetron (ZOFRAN) injection 4 mg, Q6H PRN  heparin (porcine) injection 5,000 Units, 3 times per day  atorvastatin (LIPITOR) tablet 10 mg, Nightly  lactobacillus (CULTURELLE) capsule 1 capsule, Daily  brimonidine (ALPHAGAN) 0.2 % ophthalmic solution 1 drop, BID    And  timolol (TIMOPTIC) 0.5 % ophthalmic solution 1 drop, BID        Review of Systems:   Pertinent items are noted in HPI. Remainder of a complete review of systems is (-) other than stated in the HPI    Physical exam:   Constitutional:  Elderly female in NAD  Vitals:   VITALS:  BP (!) 170/88   Pulse 66   Temp 98.6 °F (37 °C) (Oral)   Resp 18   Ht 5' 5\" (1.651 m)   Wt 162 lb 6.4 oz (73.7 kg)   SpO2 100%   BMI 27.02 kg/m²   24HR INTAKE/OUTPUT:      Intake/Output Summary (Last 24 hours) at 8/7/2020 1253  Last data filed at 8/6/2020 2142  Gross per 24 hour   Intake 80 ml   Output 950 ml   Net -870 ml     URINARY CATHETER OUTPUT (West):     DRAIN/TUBE OUTPUT:     VENT SETTINGS:  Vent Information  SpO2: 100 %  Additional Respiratory  Assessments  Pulse: 66  Resp: 18  SpO2: 100 %  Oral Care: Teeth brushed    Skin: no rash, turgor wnl  Heent:  eomi, mmm  Neck: no bruits or jvd noted  Cardiovascular: PMI not lat displaced   S1, S2 without S3 or a rub  Respiratory: CTA B without w/r/r  Abdomen:  +bs, soft, nt, nd  Ext: (-) bilat  lower extremity edema  Psychiatric: mood and affect appropriate, cr nr 2-12 grossly intact  Musculoskeletal:  Rom, muscular strength intact    Data:   Labs:  CBC:   Lab Results   Component Value Date    WBC 5.2 08/07/2020    RBC 4.29 08/07/2020    HGB 11.3 08/07/2020    HCT 34.3 08/07/2020    MCV 80.0 08/07/2020    MCH 26.3 08/07/2020    MCHC 32.9 08/07/2020    RDW 13.9 08/07/2020     08/07/2020    MPV 10.6 08/07/2020     CBC with Differential:    Lab Results   Component Value Date    WBC 5.2 08/07/2020    RBC 4.29 08/07/2020    HGB 11.3 08/07/2020    HCT 34.3 08/07/2020     08/07/2020    MCV 80.0 08/07/2020    MCH 26.3 08/07/2020    MCHC 32.9 08/07/2020    RDW 13.9 08/07/2020    NRBC 0.0 08/04/2020    SEGSPCT 38 02/20/2014    LYMPHOPCT 35.9 08/07/2020    MONOPCT 9.1 08/07/2020    EOSPCT 2 10/08/2010    BASOPCT 0.4 08/07/2020    MONOSABS 0.47 08/07/2020    LYMPHSABS 1.85 08/07/2020    EOSABS 0.12 08/07/2020    BASOSABS 0.02 08/07/2020     Hemoglobin/Hematocrit:    Lab Results   Component Value Date    HGB 11.3 08/07/2020    HCT 34.3 08/07/2020     CMP:    Lab Results   Component Value Date     08/07/2020    K 3.5 08/07/2020    K 2.9 08/03/2020     08/07/2020    CO2 24 08/07/2020    BUN 9 08/07/2020    CREATININE 1.4 08/07/2020    GFRAA 44 08/07/2020    LABGLOM 44 08/07/2020    GLUCOSE 115 08/07/2020    GLUCOSE 120 02/20/2012    PROT 6.0 08/07/2020    LABALBU 3.2 08/07/2020    LABALBU 3.8 02/20/2012    CALCIUM 8.4 08/07/2020    BILITOT 0.4 08/07/2020    ALKPHOS 56 08/07/2020    AST 71 08/07/2020    ALT 27 08/07/2020     BMP:    Lab Results   Component Value Date     08/07/2020 K 3.5 08/07/2020    K 2.9 08/03/2020     08/07/2020    CO2 24 08/07/2020    BUN 9 08/07/2020    LABALBU 3.2 08/07/2020    LABALBU 3.8 02/20/2012    CREATININE 1.4 08/07/2020    CALCIUM 8.4 08/07/2020    GFRAA 44 08/07/2020    LABGLOM 44 08/07/2020    GLUCOSE 115 08/07/2020    GLUCOSE 120 02/20/2012     BUN/Creatinine:    Lab Results   Component Value Date    BUN 9 08/07/2020    CREATININE 1.4 08/07/2020     Hepatic Function Panel:    Lab Results   Component Value Date    ALKPHOS 56 08/07/2020    ALT 27 08/07/2020    AST 71 08/07/2020    PROT 6.0 08/07/2020    BILITOT 0.4 08/07/2020    BILIDIR 0.2 01/15/2013    LABALBU 3.2 08/07/2020    LABALBU 3.8 02/20/2012     Albumin:    Lab Results   Component Value Date    LABALBU 3.2 08/07/2020    LABALBU 3.8 02/20/2012     Calcium:    Lab Results   Component Value Date    CALCIUM 8.4 08/07/2020     Ionized Calcium:  No results found for: IONCA  Magnesium:    Lab Results   Component Value Date    MG 1.4 08/07/2020     Phosphorus:    Lab Results   Component Value Date    PHOS 2.1 08/07/2020     LDH:  No results found for: LDH  Uric Acid:  No results found for: LABURIC, URICACID  PT/INR:    Lab Results   Component Value Date    PROTIME 11.7 08/04/2020    INR 1.0 08/04/2020     PTT:    Lab Results   Component Value Date    APTT 26.5 08/04/2020   [APTT}  Troponin:    Lab Results   Component Value Date    TROPONINI 0.02 01/15/2013    TROPONINI <0.01 02/15/2012     U/A:    Lab Results   Component Value Date    COLORU Yellow 08/03/2020    PROTEINU TRACE 08/03/2020    PHUR 5.0 08/03/2020    LABCAST FEW  01/15/2013    WBCUA NONE 08/03/2020    RBCUA NONE 08/03/2020    RBCUA NONE 01/15/2013    BACTERIA NONE SEEN 08/03/2020    CLARITYU Clear 08/03/2020    SPECGRAV 1.020 08/03/2020    LEUKOCYTESUR Negative 08/03/2020    UROBILINOGEN 0.2 08/03/2020    BILIRUBINUR Negative 08/03/2020    BLOODU Negative 08/03/2020    GLUCOSEU Negative 08/03/2020    AMORPHOUS FEW 01/15/2013     ABG:  No results found for: PH, PCO2, PO2, HCO3, BE, THGB, TCO2, O2SAT  HgBA1c:    Lab Results   Component Value Date    LABA1C 6.4 2020     Microalbumen/Creatinine ratio:  No components found for: RUCREAT  FLP:    Lab Results   Component Value Date    TRIG 314 2020    HDL 16 2020    LDLCALC 114 2020    LABVLDL 63 2020     TSH:    Lab Results   Component Value Date    TSH 4.420 2014     VITAMIN B12: No components found for: B12  FOLATE:    Lab Results   Component Value Date    FOLATE 12.0 2020     Iron Saturation:  No components found for: PERCENTFE  FERRITIN:    Lab Results   Component Value Date    FERRITIN 627 2020     AMYLASE:    Lab Results   Component Value Date    AMYLASE 96 10/28/2014     LIPASE:    Lab Results   Component Value Date    LIPASE 44 2020     Fibrinogen Level:  No components found for: FIB  24 Hour Urine for Protein:  No components found for: RAWUPRO, UHRS3, EDRK54YS, UTV3  24 Hour Urine for Creatinine Clearance:  No components found for: Joon Mann[place], UTV10     Imaging:  Patient MRN: [id number]    : 1945    Age:  74 years    Gender: Female    Order Date: 2020 6:35 AM    Exam: XR CHEST PORTABLE    Number of Images: 1 view    Indication:   Cough    Cough    Comparison: 2018         FINDINGS:         Heart and pulmonary vascularity normal. Lungs clear. Costophrenic    angles sharp. Normal aorta.                   Impression    No acute cardiopulmonary findings. Patient MRN:  [id number]    : 1945    Age: 76 years    Gender: Female    Order Date:  8/3/2020 4:36 PM    EXAM: CT ABDOMEN PELVIS WO CONTRAST    INDICATION:  acute kidney injury    acute kidney injury      COMPARISON: CT the abdomen and pelvis, 2010         Technique: Low-dose CT  acquisition technique included one of    following options; 1 . Automated exposure control, 2. Adjustment of MA    and or KV according to patient's size or 3.  Use of iterative reconstruction.         Multiple computerized tomography sections of the abdomen and pelvis    with sagittal and coronal MPR reconstructions were obtained from the    top of the diaphragm to the pelvis.                FINDINGS:         LOWER THORAX: Unremarkable. LIVER: Unremarkable. GALLBLADDER:Unremarkable    SPLEEN:Unremarkable. ADRENALS:Unremarkable. KIDNEYS: The kidneys are normal in size. No stone or hydronephrosis is    seen. Multiple small lesions are seen in the kidneys, which may    represent cysts. A 1.5 cm exophytic hyperdense lesion along the upper    pole of the left kidney may represent a solid lesion. Evaluation with sonography is recommended. PANCREAS: 1.6 cm hypodense lesion is seen in the region of the    uncinate process. It was not visualized on the prior CT from 2010    BOWEL: No bowel wall thickening or obstruction. APPENDIX:Unremarkable. BLADDER:Unremarkable. REPRODUCTIVE ORGANS: Status post hysterectomy. VASCULATURE:Mild calcified atherosclerosis seen in the aorta. No    aneurysm. LYMPH NODES:Unremarkable. BONES:Evaluation of the bones reveals no fracture or joint    dislocation. Prominent sclerosis along the left femoral head likely    due to avascular necrosis. Severe loss of disc height at L5-S1. Othelia Berto MISCELLANEOUS:No additional finding.              Impression              1. No acute abnormality seen in the abdomen or the pelvis. 2. Bilateral renal lesions, including a 1.6 cm hyperdense lesion on    the left.      3. 1.6 and a hypodense lesion in the uncinate process of the pancreas. 4.. Postcontrast enhanced MRI of the abdomen is recommended to    evaluate both the renal and pancreatic lesions.       US RETROPERITONEAL COMPLETE [[id number]]      Resulted: 20     Updated: 20     Narrative:      Patient Mrn: [id number]   : 1945   Age:  68 years   Gender: Female   Order Date: 2020 4:21 PM     Exam: [facility] RETROPERITONEAL COMPLETE     Number Of Images: 46 Views     Indication:  Bilateral renal probable cysts reported by CT Abdomen and   Pelvis noncontrast imaging August 3, 2020     Comparison: Noncontrast CT abdomen and pelvis study August 3, 2020     TECHNIQUE: 2-D grayscale and color Doppler imaging were utilized for   evaluation of the kidneys and bladder. Findings: The right kidney btummoat08.5 x 4.3 x 3.7 cm, and the left   kidney measures 9.3 x 4.5 x 5.0 cm. There is no evidence of collecting system calcification, obstructive   dilation, or perinephric inflammatory change. Cortical echogenicity is   increased bilaterally, with some cortical thinning on the right,   suggesting the spectrum of medical renal disease. Cysts in the upper   pole and mid right kidney measured 2.2 and 1.2 cm diameter,   respectively. . An upper pole cyst of the left kidney measures 1.8 cm   maximum diameter. Central perfusion is intact in both kidneys by color   Doppler imaging. Images of the bladder show an estimated volume of 314 mL. The post   void residual was not measured. Bladder mural contours appear normal,   and no intraluminal abnormalities are evident. Impression:      Both kidneys show some increased cortical echogenicity suggesting the   spectrum of medical renal disease. Multifocal bilateral cysts, with   smaller bilateral cysts not enumerated above, right common finding in   patients with medical renal disease. Retrospective review of the CT   study from August 3, 2020 suggests that the posterior mid left renal   echogenic finding is likely a small hemorrhagic cyst. On this   examination, several of the anechoic cystic structures do not show   good through transmission/shadowing, which may be artifact related to   habitus and depth of the cysts from the skin surface.  If further   evaluation is felt to be clinically necessary on the basis of   hematuria or other clinical concerns, MRI can identify and differentiate solid from cystic structures. There is no evidence of obstructive dilation, collecting system   calcification, or perinephric inflammatory change on either side. Perfusion appears intact bilaterally. The bladder is normal in   appearance. Assessment  1-Stage III SAW sec to decreased effective renal perfusion  as evidenced by the FeNa <1%-due to the diarrhea with the decreased po intake as well as the ARB+HCTZ  UA (-) except for 25mg/dl protein (-) blood  Cr trending down 5.4-->2.2-->1.7-->1.4mg/dl  PLAN:1. Continue to monitor on IVF    2-Complex L Renal Cyst  PLAN:1. Once the cr returns to baselineand the diarrhea resolves will then need a contrast based study    3-Anemia in CKD  Ferritin 627, Iron Sat 43%  Folate 12, Vit B12 711  Stools for Occult Blood (+)  PLAN:1.  Await SPEP and UPEP    4- Sec HPTH of Renal Origin  PO4 WNL  , Vit D 56  PLAN:1. Follow Ca++ and PO4    5- HTN with CKD I-IV  BP above goal <130/80  PLAN:1. Cr back to baseline will resume the ARB continue to hold diuretic    6- CKD G3 B baseline serum cr 1.4-1.5mg/dl with an e-GFR=41-44ml/min presumed sec to microvascular disease    7- Diarrhea-Stool Culture with Gram (-) salma    8-Hypophosphatemia  PLAN:1. Supplement with IV KPO4    9- Hypomagnesemia  PLAN:1. IV supplement Mg++    Thank you  for allowing us to participate in care of Kathy Bacon  12:53 PM  8/7/2020

## 2020-08-07 NOTE — PROGRESS NOTES
Subjective: The patient is awake and alert. No problems overnight. Denies chest pain, angina, and dyspnea. Denies abdominal pain. Tolerating diet. No nausea or vomiting. Less diarrhea with clear diet. BP (!) 170/88   Pulse 66   Temp 98.6 °F (37 °C) (Oral)   Resp 18   Ht 5' 5\" (1.651 m)   Wt 162 lb 6.4 oz (73.7 kg)   SpO2 100%   BMI 27.02 kg/m²     Current medications that patient is taking have been reviewed. Heart:  RRR, no murmurs, gallops, or rubs.   Lungs:  CTA bilaterally, no wheeze, rales or rhonchi  Abd: bowel sounds present, soft, nontender, nondistended, no masses  Extrem:  No cyanosis or edema    CBC with Differential:    Lab Results   Component Value Date    WBC 5.2 08/07/2020    RBC 4.29 08/07/2020    HGB 11.3 08/07/2020    HCT 34.3 08/07/2020     08/07/2020    MCV 80.0 08/07/2020    MCH 26.3 08/07/2020    MCHC 32.9 08/07/2020    RDW 13.9 08/07/2020    NRBC 0.0 08/04/2020    SEGSPCT 38 02/20/2014    LYMPHOPCT 35.9 08/07/2020    MONOPCT 9.1 08/07/2020    EOSPCT 2 10/08/2010    BASOPCT 0.4 08/07/2020    MONOSABS 0.47 08/07/2020    LYMPHSABS 1.85 08/07/2020    EOSABS 0.12 08/07/2020    BASOSABS 0.02 08/07/2020     CMP:    Lab Results   Component Value Date     08/07/2020    K 3.5 08/07/2020    K 2.9 08/03/2020     08/07/2020    CO2 24 08/07/2020    BUN 9 08/07/2020    CREATININE 1.4 08/07/2020    GFRAA 44 08/07/2020    LABGLOM 44 08/07/2020    GLUCOSE 115 08/07/2020    GLUCOSE 120 02/20/2012    PROT 6.0 08/07/2020    LABALBU 3.2 08/07/2020    LABALBU 3.8 02/20/2012    CALCIUM 8.4 08/07/2020    BILITOT 0.4 08/07/2020    ALKPHOS 56 08/07/2020    AST 71 08/07/2020    ALT 27 08/07/2020     BMP:    Lab Results   Component Value Date     08/07/2020    K 3.5 08/07/2020    K 2.9 08/03/2020     08/07/2020    CO2 24 08/07/2020    BUN 9 08/07/2020    LABALBU 3.2 08/07/2020    LABALBU 3.8 02/20/2012    CREATININE 1.4 08/07/2020    CALCIUM 8.4 08/07/2020    GFRAA 44 08/07/2020    LABGLOM 44 08/07/2020    GLUCOSE 115 08/07/2020    GLUCOSE 120 02/20/2012     Magnesium:    Lab Results   Component Value Date    MG 1.4 08/07/2020     Phosphorus:    Lab Results   Component Value Date    PHOS 2.1 08/07/2020     PT/INR:    Lab Results   Component Value Date    PROTIME 11.7 08/04/2020    INR 1.0 08/04/2020     PTT:    Lab Results   Component Value Date    APTT 26.5 08/04/2020   [APTT}     Assessment:    Patient Active Problem List   Diagnosis    Essential hypertension    Acute kidney injury ([facility] [address]    Pancreatic lesion    Avascular necrosis of femoral head, left (HCC)    Diarrhea    Enteritis    Dehydration    CKD (chronic kidney disease) stage 3, GFR 30-59 ml/min ([facility])       Plan:    Blood pressure ok, continue current medications  Secondary to dehydration. Cr down to 1.7. Appreciate urology evaluation of renal cysts. Appreciate GI evaluation. Appreciate ortho evaluation. Continue conservative therapy for OA. Most likely viral. Back off diet. As above. IV fluids. Encourage orals. As above  Pt/Ot evaluations for discharge planning. Discharge soon.         Wander Sherwood    9:43 AM  8/7/2020

## 2020-08-08 LAB
ALBUMIN SERPL-MCNC: 3.4 G/DL (ref 3.5–5.2)
ALP BLD-CCNC: 65 U/L (ref 35–104)
ALT SERPL-CCNC: 50 U/L (ref 0–32)
ANION GAP SERPL CALCULATED.3IONS-SCNC: 14 MMOL/L (ref 7–16)
AST SERPL-CCNC: 121 U/L (ref 0–31)
BILIRUB SERPL-MCNC: 0.5 MG/DL (ref 0–1.2)
BUN BLDV-MCNC: 4 MG/DL (ref 8–23)
CALCIUM SERPL-MCNC: 7.7 MG/DL (ref 8.6–10.2)
CHLORIDE BLD-SCNC: 102 MMOL/L (ref 98–107)
CO2: 23 MMOL/L (ref 22–29)
CREAT SERPL-MCNC: 1.1 MG/DL (ref 0.5–1)
GFR AFRICAN AMERICAN: 59
GFR NON-AFRICAN AMERICAN: 59 ML/MIN/1.73
GLUCOSE BLD-MCNC: 179 MG/DL (ref 74–99)
HCT VFR BLD CALC: 33.7 % (ref 34–48)
HEMOGLOBIN: 11.3 G/DL (ref 11.5–15.5)
MAGNESIUM: 1.2 MG/DL (ref 1.6–2.6)
MCH RBC QN AUTO: 26.4 PG (ref 26–35)
MCHC RBC AUTO-ENTMCNC: 33.5 % (ref 32–34.5)
MCV RBC AUTO: 78.7 FL (ref 80–99.9)
PANCREATIC ELASTASE, FECAL: >500 UG/G
PDW BLD-RTO: 13.8 FL (ref 11.5–15)
PHOSPHORUS: 1.4 MG/DL (ref 2.5–4.5)
PLATELET # BLD: 285 E9/L (ref 130–450)
PMV BLD AUTO: 10.6 FL (ref 7–12)
POTASSIUM SERPL-SCNC: 3.1 MMOL/L (ref 3.5–5)
RBC # BLD: 4.28 E12/L (ref 3.5–5.5)
SODIUM BLD-SCNC: 139 MMOL/L (ref 132–146)
TOTAL PROTEIN: 6.3 G/DL (ref 6.4–8.3)
WBC # BLD: 5.8 E9/L (ref 4.5–11.5)

## 2020-08-08 PROCEDURE — 89055 LEUKOCYTE ASSESSMENT FECAL: CPT

## 2020-08-08 PROCEDURE — 84100 ASSAY OF PHOSPHORUS: CPT

## 2020-08-08 PROCEDURE — 6370000000 HC RX 637 (ALT 250 FOR IP): Performed by: HOSPITALIST

## 2020-08-08 PROCEDURE — 2500000003 HC RX 250 WO HCPCS: Performed by: INTERNAL MEDICINE

## 2020-08-08 PROCEDURE — 6370000000 HC RX 637 (ALT 250 FOR IP): Performed by: NURSE PRACTITIONER

## 2020-08-08 PROCEDURE — 83735 ASSAY OF MAGNESIUM: CPT

## 2020-08-08 PROCEDURE — 2580000003 HC RX 258: Performed by: INTERNAL MEDICINE

## 2020-08-08 PROCEDURE — 6360000002 HC RX W HCPCS: Performed by: NURSE PRACTITIONER

## 2020-08-08 PROCEDURE — 85027 COMPLETE CBC AUTOMATED: CPT

## 2020-08-08 PROCEDURE — 36415 COLL VENOUS BLD VENIPUNCTURE: CPT

## 2020-08-08 PROCEDURE — 1200000000 HC SEMI PRIVATE

## 2020-08-08 PROCEDURE — 80053 COMPREHEN METABOLIC PANEL: CPT

## 2020-08-08 PROCEDURE — 82705 FATS/LIPIDS FECES QUAL: CPT

## 2020-08-08 PROCEDURE — 6370000000 HC RX 637 (ALT 250 FOR IP): Performed by: INTERNAL MEDICINE

## 2020-08-08 PROCEDURE — 6360000002 HC RX W HCPCS: Performed by: INTERNAL MEDICINE

## 2020-08-08 PROCEDURE — 82272 OCCULT BLD FECES 1-3 TESTS: CPT

## 2020-08-08 PROCEDURE — 6370000000 HC RX 637 (ALT 250 FOR IP)

## 2020-08-08 PROCEDURE — 2580000003 HC RX 258: Performed by: NURSE PRACTITIONER

## 2020-08-08 RX ORDER — HYDRALAZINE HYDROCHLORIDE 20 MG/ML
10 INJECTION INTRAMUSCULAR; INTRAVENOUS EVERY 6 HOURS PRN
Status: DISCONTINUED | OUTPATIENT
Start: 2020-08-08 | End: 2020-08-11 | Stop reason: HOSPADM

## 2020-08-08 RX ORDER — AMLODIPINE BESYLATE 5 MG/1
TABLET ORAL
Status: COMPLETED
Start: 2020-08-08 | End: 2020-08-08

## 2020-08-08 RX ORDER — HYDRALAZINE HYDROCHLORIDE 50 MG/1
50 TABLET, FILM COATED ORAL EVERY 8 HOURS SCHEDULED
Status: DISCONTINUED | OUTPATIENT
Start: 2020-08-08 | End: 2020-08-11 | Stop reason: HOSPADM

## 2020-08-08 RX ORDER — POTASSIUM CHLORIDE 20 MEQ/1
40 TABLET, EXTENDED RELEASE ORAL
Status: COMPLETED | OUTPATIENT
Start: 2020-08-08 | End: 2020-08-08

## 2020-08-08 RX ORDER — AMLODIPINE BESYLATE 10 MG/1
10 TABLET ORAL DAILY
Status: DISCONTINUED | OUTPATIENT
Start: 2020-08-09 | End: 2020-08-11 | Stop reason: HOSPADM

## 2020-08-08 RX ORDER — LABETALOL HYDROCHLORIDE 5 MG/ML
10 INJECTION, SOLUTION INTRAVENOUS EVERY 4 HOURS PRN
Status: DISCONTINUED | OUTPATIENT
Start: 2020-08-08 | End: 2020-08-11 | Stop reason: HOSPADM

## 2020-08-08 RX ORDER — AMLODIPINE BESYLATE 5 MG/1
5 TABLET ORAL DAILY
Status: DISCONTINUED | OUTPATIENT
Start: 2020-08-08 | End: 2020-08-08

## 2020-08-08 RX ORDER — MAGNESIUM SULFATE IN WATER 40 MG/ML
4 INJECTION, SOLUTION INTRAVENOUS ONCE
Status: COMPLETED | OUTPATIENT
Start: 2020-08-08 | End: 2020-08-08

## 2020-08-08 RX ADMIN — Medication 500 MG: at 09:36

## 2020-08-08 RX ADMIN — AMLODIPINE BESYLATE 5 MG: 5 TABLET ORAL at 09:36

## 2020-08-08 RX ADMIN — MAGNESIUM SULFATE 4 G: 4 INJECTION INTRAVENOUS at 10:25

## 2020-08-08 RX ADMIN — HEPARIN SODIUM 5000 UNITS: 10000 INJECTION INTRAVENOUS; SUBCUTANEOUS at 21:52

## 2020-08-08 RX ADMIN — Medication 250 MG: at 17:31

## 2020-08-08 RX ADMIN — Medication 10 ML: at 20:38

## 2020-08-08 RX ADMIN — METRONIDAZOLE 500 MG: 500 TABLET, FILM COATED ORAL at 05:30

## 2020-08-08 RX ADMIN — TIMOLOL MALEATE 1 DROP: 5 SOLUTION/ DROPS OPHTHALMIC at 09:40

## 2020-08-08 RX ADMIN — Medication 10 ML: at 09:41

## 2020-08-08 RX ADMIN — MULTIPLE VITAMINS W/ MINERALS TAB 1 TABLET: TAB at 09:35

## 2020-08-08 RX ADMIN — HEPARIN SODIUM 5000 UNITS: 10000 INJECTION INTRAVENOUS; SUBCUTANEOUS at 05:31

## 2020-08-08 RX ADMIN — VITAMIN D, TAB 1000IU (100/BT) 1000 UNITS: 25 TAB at 09:36

## 2020-08-08 RX ADMIN — OXYCODONE HYDROCHLORIDE 10 MG: 10 TABLET, FILM COATED, EXTENDED RELEASE ORAL at 12:05

## 2020-08-08 RX ADMIN — BRIMONIDINE TARTRATE 1 DROP: 2 SOLUTION OPHTHALMIC at 09:40

## 2020-08-08 RX ADMIN — METRONIDAZOLE 500 MG: 500 TABLET, FILM COATED ORAL at 14:22

## 2020-08-08 RX ADMIN — POTASSIUM CHLORIDE 40 MEQ: 20 TABLET, EXTENDED RELEASE ORAL at 12:05

## 2020-08-08 RX ADMIN — POTASSIUM CHLORIDE 40 MEQ: 20 TABLET, EXTENDED RELEASE ORAL at 14:22

## 2020-08-08 RX ADMIN — BRIMONIDINE TARTRATE 1 DROP: 2 SOLUTION OPHTHALMIC at 20:34

## 2020-08-08 RX ADMIN — ATORVASTATIN CALCIUM 10 MG: 10 TABLET, FILM COATED ORAL at 20:36

## 2020-08-08 RX ADMIN — ALPRAZOLAM 1 MG: 1 TABLET ORAL at 21:52

## 2020-08-08 RX ADMIN — CEFTRIAXONE 1 G: 1 INJECTION, POWDER, FOR SOLUTION INTRAMUSCULAR; INTRAVENOUS at 12:05

## 2020-08-08 RX ADMIN — ALPRAZOLAM 1 MG: 1 TABLET ORAL at 09:35

## 2020-08-08 RX ADMIN — Medication 250 MG: at 05:30

## 2020-08-08 RX ADMIN — OXYCODONE HYDROCHLORIDE 10 MG: 10 TABLET, FILM COATED, EXTENDED RELEASE ORAL at 23:56

## 2020-08-08 RX ADMIN — OXYCODONE HYDROCHLORIDE 10 MG: 10 TABLET, FILM COATED, EXTENDED RELEASE ORAL at 00:08

## 2020-08-08 RX ADMIN — ACETAMINOPHEN 650 MG: 325 TABLET ORAL at 05:20

## 2020-08-08 RX ADMIN — HYDRALAZINE HYDROCHLORIDE 50 MG: 50 TABLET ORAL at 14:22

## 2020-08-08 RX ADMIN — CLOPIDOGREL 75 MG: 75 TABLET, FILM COATED ORAL at 09:35

## 2020-08-08 RX ADMIN — Medication 250 MG: at 23:56

## 2020-08-08 RX ADMIN — LABETALOL HYDROCHLORIDE 10 MG: 5 INJECTION INTRAVENOUS at 06:49

## 2020-08-08 RX ADMIN — Medication 250 MG: at 12:05

## 2020-08-08 RX ADMIN — Medication 1 CAPSULE: at 09:35

## 2020-08-08 RX ADMIN — ALPRAZOLAM 1 MG: 1 TABLET ORAL at 14:37

## 2020-08-08 RX ADMIN — LATANOPROST 1 DROP: 50 SOLUTION OPHTHALMIC at 20:34

## 2020-08-08 RX ADMIN — Medication 250 MG: at 00:08

## 2020-08-08 RX ADMIN — HYDRALAZINE HYDROCHLORIDE 25 MG: 25 TABLET, FILM COATED ORAL at 05:21

## 2020-08-08 RX ADMIN — METRONIDAZOLE 500 MG: 500 TABLET, FILM COATED ORAL at 21:52

## 2020-08-08 RX ADMIN — POTASSIUM CHLORIDE 40 MEQ: 20 TABLET, EXTENDED RELEASE ORAL at 09:36

## 2020-08-08 RX ADMIN — HEPARIN SODIUM 5000 UNITS: 10000 INJECTION INTRAVENOUS; SUBCUTANEOUS at 14:22

## 2020-08-08 RX ADMIN — LOSARTAN POTASSIUM 50 MG: 50 TABLET ORAL at 09:35

## 2020-08-08 RX ADMIN — HYDRALAZINE HYDROCHLORIDE 10 MG: 20 INJECTION INTRAMUSCULAR; INTRAVENOUS at 09:38

## 2020-08-08 RX ADMIN — CHOLESTYRAMINE 4 G: 4 POWDER, FOR SUSPENSION ORAL at 21:52

## 2020-08-08 RX ADMIN — HYDRALAZINE HYDROCHLORIDE 50 MG: 50 TABLET ORAL at 21:52

## 2020-08-08 RX ADMIN — TIMOLOL MALEATE 1 DROP: 5 SOLUTION/ DROPS OPHTHALMIC at 20:34

## 2020-08-08 ASSESSMENT — PAIN DESCRIPTION - PROGRESSION
CLINICAL_PROGRESSION: GRADUALLY WORSENING
CLINICAL_PROGRESSION: GRADUALLY WORSENING

## 2020-08-08 ASSESSMENT — PAIN DESCRIPTION - ONSET: ONSET: ON-GOING

## 2020-08-08 ASSESSMENT — PAIN DESCRIPTION - ORIENTATION: ORIENTATION: RIGHT;LEFT

## 2020-08-08 ASSESSMENT — PAIN DESCRIPTION - DESCRIPTORS: DESCRIPTORS: HEADACHE

## 2020-08-08 ASSESSMENT — PAIN SCALES - GENERAL
PAINLEVEL_OUTOF10: 0
PAINLEVEL_OUTOF10: 6
PAINLEVEL_OUTOF10: 1
PAINLEVEL_OUTOF10: 0
PAINLEVEL_OUTOF10: 0

## 2020-08-08 ASSESSMENT — PAIN DESCRIPTION - LOCATION: LOCATION: HEAD

## 2020-08-08 NOTE — PROGRESS NOTES
BILITOT 0.6 0.4   ALKPHOS 54 56     Protein/ Albumin:    Lab Results   Component Value Date    LABALBU 3.2 (L) 2020                 Procedure  Component  Value  Ref Range  Date/Time       XR ABDOMEN (KUB) (SINGLE AP VIEW) [[id number]]  Resulted: 20       Order Status: Completed  Updated: 20       Narrative:         Patient MRN:  [id number]   : 1945   Age: 76 years   Gender: Female   Order Date:  2020 4:25 PM   EXAM: XR ABDOMEN (KUB) (SINGLE AP VIEW)   INDICATION:  Persistent diarrhea   Persistent diarrhea     COMPARISON: None     FINDINGS:     BOWEL: The bowel gas pattern is unremarkable. No excessive fecal or   gaseous distention of bowel loops is seen. No air-fluid level. BONES: No fracture or focally destructive lesion. MISCELLANEOUS: No nephrolithiasis visualized.       Impression:         Unremarkable abdominal radiograph.       US RETROPERITONEAL COMPLETE [[id number]]  Resulted: 20       Order Status: Completed  Updated: 20       Narrative:         Patient Mrn: [id number]   : 1945   Age:  68 years   Gender: Female   Order Date: 2020 4:21 PM     Exam: US RETROPERITONEAL COMPLETE     Number Of Images: 46 Views     Indication:  Bilateral renal probable cysts reported by CT Abdomen and   Pelvis noncontrast imaging August 3, 2020     Comparison: Noncontrast CT abdomen and pelvis study August 3, 2020     TECHNIQUE: 2-D grayscale and color Doppler imaging were utilized for   evaluation of the kidneys and bladder. Findings: The right kidney tnvqdrns91.5 x 4.3 x 3.7 cm, and the left   kidney measures 9.3 x 4.5 x 5.0 cm. There is no evidence of collecting system calcification, obstructive   dilation, or perinephric inflammatory change. Cortical echogenicity is   increased bilaterally, with some cortical thinning on the right,   suggesting the spectrum of medical renal disease.  Cysts in the upper   pole and mid right kidney measured 2.2 and 1.2 cm diameter,   respectively. . An upper pole cyst of the left kidney measures 1.8 cm   maximum diameter. Central perfusion is intact in both kidneys by color   Doppler imaging. Images of the bladder show an estimated volume of 314 mL. The post   void residual was not measured. Bladder mural contours appear normal,   and no intraluminal abnormalities are evident.       Impression:         Both kidneys show some increased cortical echogenicity suggesting the   spectrum of medical renal disease. Multifocal bilateral cysts, with   smaller bilateral cysts not enumerated above, right common finding in   patients with medical renal disease. Retrospective review of the CT   study from August 3, 2020 suggests that the posterior mid left renal   echogenic finding is likely a small hemorrhagic cyst. On this   examination, several of the anechoic cystic structures do not show   good through transmission/shadowing, which may be artifact related to   habitus and depth of the cysts from the skin surface. If further   evaluation is felt to be clinically necessary on the basis of   hematuria or other clinical concerns, MRI can identify and   differentiate solid from cystic structures. There is no evidence of obstructive dilation, collecting system   calcification, or perinephric inflammatory change on either side. Perfusion appears intact bilaterally.  The bladder is normal in   appearance.       US RETROPERITONEAL LIMITED [[id number]]        Order Status: Canceled        XR PELVIS (1-2 VIEWS) [[id number]]        Order Status: Canceled        XR HIP LEFT (2-3 VIEWS) [[id number]]        Order Status: Canceled        XR CHEST PORTABLE [[id number]]  Resulted: 20 7862       Order Status: Completed  Updated: 20 0654       Narrative:         Patient MRN: [id number]   : 1945   Age:  68 years   Gender: Female   Order Date: 2020 6:35 AM   Exam: XR CHEST PORTABLE   Number of Images: 1 view Indication:   Cough   Cough   Comparison: November 7, 2018     FINDINGS:     Heart and pulmonary vascularity normal. Lungs clear. Costophrenic   angles sharp. Normal aorta.         Impression:         No acute cardiopulmonary findings.                Ref. Range 2/20/2014 12:50 3/6/2014 14:45 8/4/2020 09:25 8/5/2020 05:10 8/5/2020 08:55   AFP-Tumor Marker Latest Ref Range: 0 - 9 ng/mL   4     CA 19-9 Latest Ref Range: 0 - 37 U/mL   6 6    CEA Latest Ref Range: 0.0 - 5.2 ng/mL   2.4     ALLYSON Unknown NEGATIVE       Rheumatoid Factor Latest Ref Range: 0 - 13 IU/mL <10       Cryoglobulin Unknown  NEGATIVE      Alpha-1-Globulin Latest Ref Range: 0.2 - 0.4 g/dL    0.3    Alpha-2-Globulin Latest Ref Range: 0.5 - 1.0 g/fL    1.0    Beta Globulin Latest Ref Range: 0.8 - 1.3 g/dL    0.9    Gamma Globulin Latest Ref Range: 0.7 - 1.6 g/dL    0.8    Immunofixation Result, Serum Unknown  No monoclonal protein identified. IMMUNOFIXATION SERUM PROFILE Unknown  Rpt      IMMUNOFIXATION URINE RANDOM PROFILE Unknown     Rpt   Hep A IgM Latest Ref Range: NON REACT     Non-Reactive    Hep B S Ag Interp Latest Ref Range: NON REACT     Non-Reactive    Hep C Ab Interp Latest Ref Range: NON REACT     Non-Reactive    Hep B Core Ab, IgM Latest Ref Range: NON REACT     Non-Reactive       Ref.  Range 8/3/2020 21:07   Color, UA Latest Ref Range: Straw/Yellow  Yellow   Clarity, UA Latest Ref Range: Clear  Clear   Glucose, UA Latest Ref Range: Negative mg/dL Negative   Bilirubin, Urine Latest Ref Range: Negative  Negative   Ketones, Urine Latest Ref Range: Negative mg/dL Negative   Specific Gravity, UA Latest Ref Range: 1.005 - 1.030  1.020   Blood, Urine Latest Ref Range: Negative  Negative   pH, UA Latest Ref Range: 5.0 - 9.0  5.0   Protein, UA Latest Ref Range: Negative mg/dL TRACE   Protein, Ur Latest Ref Range: 0 - 12 mg/dL 25 (H)   Urobilinogen, Urine Latest Ref Range: <2.0 E.U./dL 0.2   Nitrite, Urine Latest Ref Range: Negative  Negative Leukocyte Esterase, Urine Latest Ref Range: Negative  Negative   WBC, UA Latest Ref Range: 0 - 5 /HPF NONE   RBC, UA Latest Ref Range: 0 - 2 /HPF NONE   Bacteria, UA Latest Ref Range: None Seen /HPF NONE SEEN   Chloride Latest Ref Range: Not Established mmol/L 35   Creatinine, Ur Latest Ref Range: 29 - 226 mg/dL 177   Potassium, Ur Latest Ref Range: Not Established mmol/L 13.7   Sodium, Ur Latest Ref Range: Not Established mmol/L 37      Ref. Range 8/5/2020 08:55   Immunofixation Urine Unknown No monoclonal protein identified. Objective:     Vitals: BP (!) 196/92   Pulse 66   Temp 98.7 °F (37.1 °C)   Resp 18   Ht 5' 5\" (1.651 m)   Wt 162 lb 6.4 oz (73.7 kg)   SpO2 99%   BMI 27.02 kg/m²   General appearance: Sitting up in bed. Awake alert and oriented not in distress. Lungs: Good air movement  Heart: No S3, No rub  Abdomen: Lax, soft No tenderness  L. Extremities: No edema    Assessment & Plan: Today's labs are still pending    Improved acute kidney injury on top of stage III chronic kidney disease creatinine 5.4 ----> 1.4 with IV fluids reflecting prerenal azotemia induced by diarrhea. Patient is currently on ARB    Hypertension with chronic kidney disease: Not controlled. Add amlodipine, increase hydralazine, decrease IV fluid rate    Trace proteinuria: No monoclonal protein and urine immunofixation. Check microalbumin to creatinine ratio. Patient is already on ARB    Ultrasound kidneys as above. No microscopic hematuria. Patient will benefit from urological evaluation as an outpatient on discharge    Hypomagnesemia and hypophosphatemia: Both were replaced yesterday. Await repeat labs today. Labs are still pending    Hypocalcemia in the context of hypoalbuminemia: Ionized calcium was normal.    This note was created using voice recognition software.     Electronically signed by Yovani Fuchs MD on 8/8/2020 at 6:58 AM

## 2020-08-08 NOTE — PLAN OF CARE
Problem: Falls - Risk of:  Goal: Will remain free from falls  Description: Will remain free from falls  8/8/2020 1847 by Vanda Mcclellan RN  Outcome: Met This Shift  8/8/2020 1052 by Wilfredo Nath RN  Outcome: Met This Shift     Problem: Falls - Risk of:  Goal: Absence of physical injury  Description: Absence of physical injury  8/8/2020 1847 by Vanda Mcclellan RN  Outcome: Met This Shift  8/8/2020 1052 by Wilfredo Nath RN  Outcome: Met This Shift

## 2020-08-08 NOTE — PROGRESS NOTES
Dr. Srinath Mendez answering service called and voice recording left. Patient c/o of headache off an on throughout evening. Vitals checked and B/P 202/96, HR 65 all other vitals stable. Patient did just receive 25 mg \"scheduled\" hydralazine. Will continue to monitor. Note: patient has outstanding stool specimen orders to r/o C-diff, etc.  Have been unable to obtain d/t being mixed with urine every time. Per lab, they will not accept these. Three very small episodes (approx 5-10 ml) of diarrhea tonight. Patients appetite has been very poor, with no intake for lunch or dinner yesterday. Managed to have patient eat a small amount of chicken soup and some apple juice at bedtime. Patient seems to be worsening with increased weakness and general malaise.

## 2020-08-08 NOTE — PROGRESS NOTES
PROGRESS NOTE  By Rickey Triplett M.D. [facility]  Dr. Mary Hancock M.D.,  Dr. Memo Boland M.D.,   Dr. Timothy Abbott D.O.,  Dr. Tamiko Zelaya M.D.,  Dr Babar Henley, Mary Beasley  76 y.o.  female    SUBJECTIVE:  Denies abdominal pain per se. Reports 3 bowel movements overnight and this morning. Described bowel movement to be dark. Denies nausea or vomiting but poor appetite    OBJECTIVE:    /80 Comment: manual  Pulse 72   Temp 97.2 °F (36.2 °C) (Oral)   Resp 18   Ht 5' 5\" (1.651 m)   Wt 162 lb 6.4 oz (73.7 kg)   SpO2 100%   BMI 27.02 kg/m²     General: NAD/-American female  HEENT: Anicteric sclera/moist oral mucosa  Neck: Supple  Chest: CTA B/symmetrical excursions  Cor: Regular  Abd.: Soft/obese. Appears nontender.   BS +  Extr.:  No significant peripheral edema  Skin: Warm and dry      DATA:    Monitor data reviewed        Lab Results   Component Value Date    WBC 5.8 08/08/2020    RBC 4.28 08/08/2020    HGB 11.3 08/08/2020    HCT 33.7 08/08/2020    MCV 78.7 08/08/2020    MCH 26.4 08/08/2020    MCHC 33.5 08/08/2020    RDW 13.8 08/08/2020     08/08/2020    MPV 10.6 08/08/2020     Lab Results   Component Value Date     08/08/2020    K 3.1 08/08/2020    K 2.9 08/03/2020     08/08/2020    CO2 23 08/08/2020    BUN 4 08/08/2020    CREATININE 1.1 08/08/2020    CALCIUM 7.7 08/08/2020    PROT 6.3 08/08/2020    LABALBU 3.4 08/08/2020    LABALBU 3.8 02/20/2012    BILITOT 0.5 08/08/2020    ALKPHOS 65 08/08/2020     08/08/2020    ALT 50 08/08/2020     Lab Results   Component Value Date    LIPASE 44 08/03/2020     Lab Results   Component Value Date    AMYLASE 96 10/28/2014         ASSESSMENT/PLAN:    1.  Pancreatic mass  -Further evaluation with contrasted MRI when kidney function  allows  - consider outpatient endoscopic ultrasound  -Nonelevated CA 19-9     2.  Diarrhea  -Return of diarrhea with general diet -persistent diarrhea despite clear liquid diet  -Stool culture negative  -Stool studies negative thus far with C. difficile pending (apparently not collected secondary to stool being mixed with urine  -Continue empiric antibiotics  -Adjusted diet to lactose controlled  -Added cholestyramine with slight improvement  -Consider nonemergen  -Consider antimotility agent such as Imodium if C. difficile negative, however without definitive result on the C. difficile testing renal failure recommend antimotility agent  -Obtain KUB though benign abdominal exam     3.  Anemia  -Acute without evidence of overt bleed  -Stable H&H after initial drop  -Suspect component of hemodilution  -Colonoscopy as above  -No iron deficiency; unremarkable RBC indices  -Positive FOBT  -In case of precipitous drop in H&H/overt bleed, may consider further evaluation with upper endoscopy in the hospital, otherwise plan for outpatient procedure  -Continue PPI     4.  Acute on chronic renal failure  -SAW on CKD likely related to dehydration secondary to poor oral intake/diarrhea  - persistently improving creatinine  -Nephrology input appreciated    NOTE:  This report was transcribed using voice recognition software. Every effort was made to ensure accuracy; however, inadvertent computerized transcription errors may be present.     Rosana Casiano MD  8/8/2020  1:11 PM

## 2020-08-08 NOTE — PROGRESS NOTES
Subjective: The patient is awake and alert. No problems overnight. Denies chest pain, angina, and dyspnea. Denies abdominal pain. Tolerating diet. No nausea or vomiting. Less diarrhea with clear diet. BP (!) 180/88   Pulse 72   Temp 97.2 °F (36.2 °C) (Oral)   Resp 18   Ht 5' 5\" (1.651 m)   Wt 162 lb 6.4 oz (73.7 kg)   SpO2 100%   BMI 27.02 kg/m²     Current medications that patient is taking have been reviewed. Heart:  RRR, no murmurs, gallops, or rubs.   Lungs:  CTA bilaterally, no wheeze, rales or rhonchi  Abd: bowel sounds present, soft, nontender, nondistended, no masses  Extrem:  No cyanosis or edema    CBC with Differential:    Lab Results   Component Value Date    WBC 5.8 08/08/2020    RBC 4.28 08/08/2020    HGB 11.3 08/08/2020    HCT 33.7 08/08/2020     08/08/2020    MCV 78.7 08/08/2020    MCH 26.4 08/08/2020    MCHC 33.5 08/08/2020    RDW 13.8 08/08/2020    NRBC 0.0 08/04/2020    SEGSPCT 38 02/20/2014    LYMPHOPCT 35.9 08/07/2020    MONOPCT 9.1 08/07/2020    EOSPCT 2 10/08/2010    BASOPCT 0.4 08/07/2020    MONOSABS 0.47 08/07/2020    LYMPHSABS 1.85 08/07/2020    EOSABS 0.12 08/07/2020    BASOSABS 0.02 08/07/2020     CMP:    Lab Results   Component Value Date     08/08/2020    K 3.1 08/08/2020    K 2.9 08/03/2020     08/08/2020    CO2 23 08/08/2020    BUN 4 08/08/2020    CREATININE 1.1 08/08/2020    GFRAA 59 08/08/2020    LABGLOM 59 08/08/2020    GLUCOSE 179 08/08/2020    GLUCOSE 120 02/20/2012    PROT 6.3 08/08/2020    LABALBU 3.4 08/08/2020    LABALBU 3.8 02/20/2012    CALCIUM 7.7 08/08/2020    BILITOT 0.5 08/08/2020    ALKPHOS 65 08/08/2020     08/08/2020    ALT 50 08/08/2020     BMP:    Lab Results   Component Value Date     08/08/2020    K 3.1 08/08/2020    K 2.9 08/03/2020     08/08/2020    CO2 23 08/08/2020    BUN 4 08/08/2020    LABALBU 3.4 08/08/2020    LABALBU 3.8 02/20/2012    CREATININE 1.1 08/08/2020    CALCIUM 7.7 08/08/2020    GFRAA 59 08/08/2020    LABGLOM 59 08/08/2020    GLUCOSE 179 08/08/2020    GLUCOSE 120 02/20/2012     Magnesium:    Lab Results   Component Value Date    MG 1.2 08/08/2020     Phosphorus:    Lab Results   Component Value Date    PHOS 1.4 08/08/2020     PT/INR:    Lab Results   Component Value Date    PROTIME 11.7 08/04/2020    INR 1.0 08/04/2020     PTT:    Lab Results   Component Value Date    APTT 26.5 08/04/2020   [APTT}     Assessment:    Patient Active Problem List   Diagnosis    Essential hypertension    Acute kidney injury ([facility] [address]    Pancreatic lesion    Avascular necrosis of femoral head, left (HCC)    Diarrhea    Enteritis    Dehydration    CKD (chronic kidney disease) stage 3, GFR 30-59 ml/min ([facility])       Plan:    Blood pressure elevated. Continue to adjust current medications  Secondary to dehydration. Cr down to 1.1. Appreciate urology evaluation of renal cysts. Supplement electrolytes. Appreciate GI evaluation. Appreciate ortho evaluation. Continue conservative therapy for OA. Most likely viral. Back off diet. As above. IV fluids. Encourage orals. As above  Pt/Ot evaluations for discharge planning. Discharge soon.         Sheron Aguiar    9:54 AM  8/8/2020

## 2020-08-08 NOTE — PROGRESS NOTES
Occupational Therapy      Occupational Therapy referral received. Patient lying in bed upon arrival, explained reason for visit.   Patient preferred to be left resting in bed, not feeling well and feeling fatigued

## 2020-08-08 NOTE — PROGRESS NOTES
Call placed to Dr Omid Guzman per patient concern of new onset left eye pain and headache as well as elevated blood pressures. Return call awaited, will continue to monitor.

## 2020-08-09 LAB
ALBUMIN SERPL-MCNC: 3 G/DL (ref 3.5–5.2)
ALP BLD-CCNC: 59 U/L (ref 35–104)
ALT SERPL-CCNC: 49 U/L (ref 0–32)
ANION GAP SERPL CALCULATED.3IONS-SCNC: 10 MMOL/L (ref 7–16)
AST SERPL-CCNC: 93 U/L (ref 0–31)
BASOPHILS ABSOLUTE: 0.03 E9/L (ref 0–0.2)
BASOPHILS RELATIVE PERCENT: 0.5 % (ref 0–2)
BILIRUB SERPL-MCNC: 0.4 MG/DL (ref 0–1.2)
BUN BLDV-MCNC: 4 MG/DL (ref 8–23)
CALCIUM SERPL-MCNC: 7.6 MG/DL (ref 8.6–10.2)
CHLORIDE BLD-SCNC: 109 MMOL/L (ref 98–107)
CO2: 24 MMOL/L (ref 22–29)
CREAT SERPL-MCNC: 1.1 MG/DL (ref 0.5–1)
EOSINOPHILS ABSOLUTE: 0.08 E9/L (ref 0.05–0.5)
EOSINOPHILS RELATIVE PERCENT: 1.3 % (ref 0–6)
GFR AFRICAN AMERICAN: 59
GFR NON-AFRICAN AMERICAN: 59 ML/MIN/1.73
GLUCOSE BLD-MCNC: 103 MG/DL (ref 74–99)
HCT VFR BLD CALC: 31 % (ref 34–48)
HEMOGLOBIN: 10.2 G/DL (ref 11.5–15.5)
IMMATURE GRANULOCYTES #: 0.06 E9/L
IMMATURE GRANULOCYTES %: 1 % (ref 0–5)
LYMPHOCYTES ABSOLUTE: 2.1 E9/L (ref 1.5–4)
LYMPHOCYTES RELATIVE PERCENT: 33.3 % (ref 20–42)
MAGNESIUM: 1.9 MG/DL (ref 1.6–2.6)
MCH RBC QN AUTO: 26.2 PG (ref 26–35)
MCHC RBC AUTO-ENTMCNC: 32.9 % (ref 32–34.5)
MCV RBC AUTO: 79.7 FL (ref 80–99.9)
MONOCYTES ABSOLUTE: 0.54 E9/L (ref 0.1–0.95)
MONOCYTES RELATIVE PERCENT: 8.6 % (ref 2–12)
NEUTROPHILS ABSOLUTE: 3.5 E9/L (ref 1.8–7.3)
NEUTROPHILS RELATIVE PERCENT: 55.3 % (ref 43–80)
OCCULT BLOOD DIAGNOSTIC: NORMAL
PDW BLD-RTO: 14.6 FL (ref 11.5–15)
PHOSPHORUS: 2 MG/DL (ref 2.5–4.5)
PLATELET # BLD: 281 E9/L (ref 130–450)
PMV BLD AUTO: 10.5 FL (ref 7–12)
POTASSIUM SERPL-SCNC: 5 MMOL/L (ref 3.5–5)
RBC # BLD: 3.89 E12/L (ref 3.5–5.5)
SODIUM BLD-SCNC: 143 MMOL/L (ref 132–146)
TOTAL PROTEIN: 5.7 G/DL (ref 6.4–8.3)
WBC # BLD: 6.3 E9/L (ref 4.5–11.5)

## 2020-08-09 PROCEDURE — 6360000002 HC RX W HCPCS: Performed by: NURSE PRACTITIONER

## 2020-08-09 PROCEDURE — 82044 UR ALBUMIN SEMIQUANTITATIVE: CPT

## 2020-08-09 PROCEDURE — 83735 ASSAY OF MAGNESIUM: CPT

## 2020-08-09 PROCEDURE — 97165 OT EVAL LOW COMPLEX 30 MIN: CPT

## 2020-08-09 PROCEDURE — 6370000000 HC RX 637 (ALT 250 FOR IP): Performed by: INTERNAL MEDICINE

## 2020-08-09 PROCEDURE — 85025 COMPLETE CBC W/AUTO DIFF WBC: CPT

## 2020-08-09 PROCEDURE — 36415 COLL VENOUS BLD VENIPUNCTURE: CPT

## 2020-08-09 PROCEDURE — 2580000003 HC RX 258: Performed by: INTERNAL MEDICINE

## 2020-08-09 PROCEDURE — 6370000000 HC RX 637 (ALT 250 FOR IP): Performed by: NURSE PRACTITIONER

## 2020-08-09 PROCEDURE — 84100 ASSAY OF PHOSPHORUS: CPT

## 2020-08-09 PROCEDURE — 2500000003 HC RX 250 WO HCPCS: Performed by: INTERNAL MEDICINE

## 2020-08-09 PROCEDURE — 82570 ASSAY OF URINE CREATININE: CPT

## 2020-08-09 PROCEDURE — 97161 PT EVAL LOW COMPLEX 20 MIN: CPT

## 2020-08-09 PROCEDURE — 80053 COMPREHEN METABOLIC PANEL: CPT

## 2020-08-09 PROCEDURE — 6360000002 HC RX W HCPCS: Performed by: INTERNAL MEDICINE

## 2020-08-09 PROCEDURE — 2580000003 HC RX 258: Performed by: NURSE PRACTITIONER

## 2020-08-09 PROCEDURE — 1200000000 HC SEMI PRIVATE

## 2020-08-09 RX ADMIN — HEPARIN SODIUM 5000 UNITS: 10000 INJECTION INTRAVENOUS; SUBCUTANEOUS at 06:23

## 2020-08-09 RX ADMIN — METRONIDAZOLE 500 MG: 500 TABLET, FILM COATED ORAL at 14:42

## 2020-08-09 RX ADMIN — HYDRALAZINE HYDROCHLORIDE 50 MG: 50 TABLET ORAL at 21:20

## 2020-08-09 RX ADMIN — Medication 10 ML: at 21:25

## 2020-08-09 RX ADMIN — Medication 1 CAPSULE: at 09:37

## 2020-08-09 RX ADMIN — AMLODIPINE BESYLATE 10 MG: 10 TABLET ORAL at 09:37

## 2020-08-09 RX ADMIN — CLOPIDOGREL 75 MG: 75 TABLET, FILM COATED ORAL at 09:37

## 2020-08-09 RX ADMIN — SODIUM PHOSPHATE, MONOBASIC, MONOHYDRATE 30 MMOL: 276; 142 INJECTION, SOLUTION INTRAVENOUS at 21:20

## 2020-08-09 RX ADMIN — CEFTRIAXONE 1 G: 1 INJECTION, POWDER, FOR SOLUTION INTRAMUSCULAR; INTRAVENOUS at 12:19

## 2020-08-09 RX ADMIN — METRONIDAZOLE 500 MG: 500 TABLET, FILM COATED ORAL at 06:22

## 2020-08-09 RX ADMIN — HYDRALAZINE HYDROCHLORIDE 50 MG: 50 TABLET ORAL at 06:22

## 2020-08-09 RX ADMIN — Medication 250 MG: at 12:19

## 2020-08-09 RX ADMIN — OXYCODONE HYDROCHLORIDE 10 MG: 10 TABLET, FILM COATED, EXTENDED RELEASE ORAL at 12:19

## 2020-08-09 RX ADMIN — HEPARIN SODIUM 5000 UNITS: 10000 INJECTION INTRAVENOUS; SUBCUTANEOUS at 21:20

## 2020-08-09 RX ADMIN — MULTIPLE VITAMINS W/ MINERALS TAB 1 TABLET: TAB at 09:37

## 2020-08-09 RX ADMIN — TIMOLOL MALEATE 1 DROP: 5 SOLUTION/ DROPS OPHTHALMIC at 09:39

## 2020-08-09 RX ADMIN — METRONIDAZOLE 500 MG: 500 TABLET, FILM COATED ORAL at 21:20

## 2020-08-09 RX ADMIN — BRIMONIDINE TARTRATE 1 DROP: 2 SOLUTION OPHTHALMIC at 21:20

## 2020-08-09 RX ADMIN — HYDRALAZINE HYDROCHLORIDE 50 MG: 50 TABLET ORAL at 14:42

## 2020-08-09 RX ADMIN — BRIMONIDINE TARTRATE 1 DROP: 2 SOLUTION OPHTHALMIC at 09:39

## 2020-08-09 RX ADMIN — LATANOPROST 1 DROP: 50 SOLUTION OPHTHALMIC at 21:20

## 2020-08-09 RX ADMIN — TIMOLOL MALEATE 1 DROP: 5 SOLUTION/ DROPS OPHTHALMIC at 21:20

## 2020-08-09 RX ADMIN — Medication 250 MG: at 18:14

## 2020-08-09 RX ADMIN — ALPRAZOLAM 1 MG: 1 TABLET ORAL at 22:10

## 2020-08-09 RX ADMIN — Medication 500 MG: at 09:37

## 2020-08-09 RX ADMIN — LOSARTAN POTASSIUM 50 MG: 50 TABLET ORAL at 09:37

## 2020-08-09 RX ADMIN — ATORVASTATIN CALCIUM 10 MG: 10 TABLET, FILM COATED ORAL at 21:20

## 2020-08-09 RX ADMIN — HEPARIN SODIUM 5000 UNITS: 10000 INJECTION INTRAVENOUS; SUBCUTANEOUS at 14:42

## 2020-08-09 RX ADMIN — Medication 250 MG: at 06:23

## 2020-08-09 ASSESSMENT — PAIN SCALES - GENERAL
PAINLEVEL_OUTOF10: 0
PAINLEVEL_OUTOF10: 7
PAINLEVEL_OUTOF10: 0

## 2020-08-09 NOTE — PROGRESS NOTES
Nephrology Progress Note  8/9/2020 7:24 PM  Subjective:   Admit Date: 8/4/2020  PCP: Pedro Luis Spence MD    Interval History: Doing better. Improved diarrhea. No pain. No shortness of breath    Diet: Dietary Nutrition Supplements: Clear Liquid Oral Supplement  DIET LOW FIBER; Lactose Controlled    Data:     Scheduled Meds:   hydrALAZINE  50 mg Oral 3 times per day    amLODIPine  10 mg Oral Daily    losartan  50 mg Oral Daily    cefTRIAXone (ROCEPHIN) IV  1 g Intravenous Q24H    vancomycin  250 mg Oral 4 times per day    metroNIDAZOLE  500 mg Oral 3 times per day    vitamin C  500 mg Oral Daily    clopidogrel  75 mg Oral Daily    latanoprost  1 drop Both Eyes Nightly    therapeutic multivitamin-minerals  1 tablet Oral Daily    Vitamin D  1,000 Units Oral Every Other Day    oxyCODONE  10 mg Oral 2 times per day    sodium chloride flush  10 mL Intravenous 2 times per day    heparin (porcine)  5,000 Units Subcutaneous 3 times per day    atorvastatin  10 mg Oral Nightly    lactobacillus  1 capsule Oral Daily    brimonidine  1 drop Both Eyes BID    And    timolol  1 drop Both Eyes BID     Continuous Infusions:   sodium chloride 50 mL/hr at 08/08/20 0826     PRN Meds:labetalol, hydrALAZINE, cholestyramine, ALPRAZolam, sodium chloride flush, acetaminophen **OR** acetaminophen, polyethylene glycol, promethazine **OR** ondansetron  I/O last 3 completed shifts: In: 10 [I.V.:10]  Out: -   No intake/output data recorded.     Intake/Output Summary (Last 24 hours) at 8/9/2020 1924  Last data filed at 8/8/2020 2034  Gross per 24 hour   Intake 10 ml   Output --   Net 10 ml     CBC:   Recent Labs     08/07/20  0322 08/08/20  0651 08/09/20  0314   WBC 5.2 5.8 6.3   HGB 11.3* 11.3* 10.2*    285 281     BMP:    Recent Labs     08/07/20  0322 08/08/20  0651 08/09/20  0314    139 143   K 3.5 3.1* 5.0    102 109*   CO2 24 23 24   BUN 9 4* 4*   CREATININE 1.4* 1.1* 1.1*   GLUCOSE 115* 179* 103* the right,   suggesting the spectrum of medical renal disease. Cysts in the upper   pole and mid right kidney measured 2.2 and 1.2 cm diameter,   respectively. . An upper pole cyst of the left kidney measures 1.8 cm   maximum diameter. Central perfusion is intact in both kidneys by color   Doppler imaging. Images of the bladder show an estimated volume of 314 mL. The post   void residual was not measured. Bladder mural contours appear normal,   and no intraluminal abnormalities are evident.       Impression:         Both kidneys show some increased cortical echogenicity suggesting the   spectrum of medical renal disease. Multifocal bilateral cysts, with   smaller bilateral cysts not enumerated above, right common finding in   patients with medical renal disease. Retrospective review of the CT   study from August 3, 2020 suggests that the posterior mid left renal   echogenic finding is likely a small hemorrhagic cyst. On this   examination, several of the anechoic cystic structures do not show   good through transmission/shadowing, which may be artifact related to   habitus and depth of the cysts from the skin surface. If further   evaluation is felt to be clinically necessary on the basis of   hematuria or other clinical concerns, MRI can identify and   differentiate solid from cystic structures. There is no evidence of obstructive dilation, collecting system   calcification, or perinephric inflammatory change on either side. Perfusion appears intact bilaterally.  The bladder is normal in   appearance.       US RETROPERITONEAL LIMITED [[id number]]        Order Status: Canceled        XR PELVIS (1-2 VIEWS) [[id number]]        Order Status: Canceled        XR HIP LEFT (2-3 VIEWS) [[id number]]        Order Status: Canceled        XR CHEST PORTABLE [[id number]]  Resulted: 20 7572       Order Status: Completed  Updated: 20 0654       Narrative:         Patient MRN: [id number]   : 1945   Age:  74 years   Gender: Female   Order Date: 8/4/2020 6:35 AM   Exam: XR CHEST PORTABLE   Number of Images: 1 view   Indication:   Cough   Cough   Comparison: November 7, 2018     FINDINGS:     Heart and pulmonary vascularity normal. Lungs clear. Costophrenic   angles sharp. Normal aorta.         Impression:         No acute cardiopulmonary findings.                Ref. Range 2/20/2014 12:50 3/6/2014 14:45 8/4/2020 09:25 8/5/2020 05:10 8/5/2020 08:55   AFP-Tumor Marker Latest Ref Range: 0 - 9 ng/mL   4     CA 19-9 Latest Ref Range: 0 - 37 U/mL   6 6    CEA Latest Ref Range: 0.0 - 5.2 ng/mL   2.4     ALLYSON Unknown NEGATIVE       Rheumatoid Factor Latest Ref Range: 0 - 13 IU/mL <10       Cryoglobulin Unknown  NEGATIVE      Alpha-1-Globulin Latest Ref Range: 0.2 - 0.4 g/dL    0.3    Alpha-2-Globulin Latest Ref Range: 0.5 - 1.0 g/fL    1.0    Beta Globulin Latest Ref Range: 0.8 - 1.3 g/dL    0.9    Gamma Globulin Latest Ref Range: 0.7 - 1.6 g/dL    0.8    Immunofixation Result, Serum Unknown  No monoclonal protein identified. IMMUNOFIXATION SERUM PROFILE Unknown  Rpt      IMMUNOFIXATION URINE RANDOM PROFILE Unknown     Rpt   Hep A IgM Latest Ref Range: NON REACT     Non-Reactive    Hep B S Ag Interp Latest Ref Range: NON REACT     Non-Reactive    Hep C Ab Interp Latest Ref Range: NON REACT     Non-Reactive    Hep B Core Ab, IgM Latest Ref Range: NON REACT     Non-Reactive       Ref.  Range 8/3/2020 21:07   Color, UA Latest Ref Range: Straw/Yellow  Yellow   Clarity, UA Latest Ref Range: Clear  Clear   Glucose, UA Latest Ref Range: Negative mg/dL Negative   Bilirubin, Urine Latest Ref Range: Negative  Negative   Ketones, Urine Latest Ref Range: Negative mg/dL Negative   Specific Gravity, UA Latest Ref Range: 1.005 - 1.030  1.020   Blood, Urine Latest Ref Range: Negative  Negative   pH, UA Latest Ref Range: 5.0 - 9.0  5.0   Protein, UA Latest Ref Range: Negative mg/dL TRACE   Protein, Ur Latest Ref Range: 0 - 12 mg/dL 25 (H) Urobilinogen, Urine Latest Ref Range: <2.0 E.U./dL 0.2   Nitrite, Urine Latest Ref Range: Negative  Negative   Leukocyte Esterase, Urine Latest Ref Range: Negative  Negative   WBC, UA Latest Ref Range: 0 - 5 /HPF NONE   RBC, UA Latest Ref Range: 0 - 2 /HPF NONE   Bacteria, UA Latest Ref Range: None Seen /HPF NONE SEEN   Chloride Latest Ref Range: Not Established mmol/L 35   Creatinine, Ur Latest Ref Range: 29 - 226 mg/dL 177   Potassium, Ur Latest Ref Range: Not Established mmol/L 13.7   Sodium, Ur Latest Ref Range: Not Established mmol/L 37      Ref. Range 8/5/2020 08:55   Immunofixation Urine Unknown No monoclonal protein identified. Objective:     Vitals: /70   Pulse 80   Temp 98.5 °F (36.9 °C) (Oral)   Resp 18   Ht 5' 5\" (1.651 m)   Wt 162 lb 6.4 oz (73.7 kg)   SpO2 100%   BMI 27.02 kg/m²   General appearance:   Awake alert and oriented not in distress. Lungs: Good air movement  Heart: No S3, No rub  Abdomen: Lax, soft No tenderness  L. Extremities: No edema    Assessment & Plan:     Improved acute kidney injury on top of stage III chronic kidney disease creatinine 5.4 ----> 1.1 with IV fluids reflecting prerenal azotemia induced by diarrhea. Patient is currently on ARB. Discontinue IV fluids    Hypertension with chronic kidney disease: Better controlled after adding amlodipine, increasing hydralazine. discontinue IV fluid rate    Trace proteinuria: No monoclonal protein and urine immunofixation. Check microalbumin to creatinine ratio. Patient is already on ARB    Ultrasound kidneys as above. No microscopic hematuria. Patient will benefit from urological evaluation as an outpatient on discharge    Supplement hypophosphatemia IV (avoid p.o. as it can cause diarrhea)    Hypocalcemia in the context of hypoalbuminemia: Ionized calcium was normal.  Recheck in a.m. This note was created using voice recognition software.     Electronically signed by José Miguel Hobbs MD on 8/9/2020 at 7:24

## 2020-08-09 NOTE — PROGRESS NOTES
the restroom. No report of dizziness during functional mobility. Pt ambulates with slow gait speed. Pt reported she was not feeling well today. At end of eval, pt left in bed with call light in reach and bed alarm on.    Pt's/ family goals   1. None stated    Patient and or family understand(s) diagnosis, prognosis, and plan of care. yes    PLAN:    PT care will be provided in accordance with the objectives noted above. Exercises and functional mobility practice will be used as well as appropriate assistive devices or modalities to obtain goals. Patient and family education will also be administered as needed. Frequency of treatments: 2-5x/week x 1-2 weeks. Time in  0958  Time out  1015      Evaluation Time includes thorough review of current medical information, gathering information on past medical history/social history and prior level of function, completion of standardized testing/informal observation of tasks, assessment of data and education on plan of care and goals.     CPT codes:  [x] Low Complexity PT evaluation 25721  [] Moderate Complexity PT evaluation 46169  [] High Complexity PT evaluation 59007  [] PT Re-evaluation 75302  [] Gait training 39688 _ minutes  [] Therapeutic activities 11239 _ minutes  [] Therapeutic exercises [address] [facility] [place], [place] [id number]

## 2020-08-09 NOTE — PROGRESS NOTES
Subjective: The patient is awake and alert. No problems overnight. Denies chest pain, angina, and dyspnea. Denies abdominal pain. Tolerating diet. No nausea or vomiting. Less diarrhea with clear diet. /70   Pulse 80   Temp 98.5 °F (36.9 °C) (Oral)   Resp 18   Ht 5' 5\" (1.651 m)   Wt 162 lb 6.4 oz (73.7 kg)   SpO2 100%   BMI 27.02 kg/m²     Current medications that patient is taking have been reviewed. Heart:  RRR, no murmurs, gallops, or rubs.   Lungs:  CTA bilaterally, no wheeze, rales or rhonchi  Abd: bowel sounds present, soft, nontender, nondistended, no masses  Extrem:  No cyanosis or edema    CBC with Differential:    Lab Results   Component Value Date    WBC 6.3 08/09/2020    RBC 3.89 08/09/2020    HGB 10.2 08/09/2020    HCT 31.0 08/09/2020     08/09/2020    MCV 79.7 08/09/2020    MCH 26.2 08/09/2020    MCHC 32.9 08/09/2020    RDW 14.6 08/09/2020    NRBC 0.0 08/04/2020    SEGSPCT 38 02/20/2014    LYMPHOPCT 33.3 08/09/2020    MONOPCT 8.6 08/09/2020    EOSPCT 2 10/08/2010    BASOPCT 0.5 08/09/2020    MONOSABS 0.54 08/09/2020    LYMPHSABS 2.10 08/09/2020    EOSABS 0.08 08/09/2020    BASOSABS 0.03 08/09/2020     CMP:    Lab Results   Component Value Date     08/09/2020    K 5.0 08/09/2020    K 2.9 08/03/2020     08/09/2020    CO2 24 08/09/2020    BUN 4 08/09/2020    CREATININE 1.1 08/09/2020    GFRAA 59 08/09/2020    LABGLOM 59 08/09/2020    GLUCOSE 103 08/09/2020    GLUCOSE 120 02/20/2012    PROT 5.7 08/09/2020    LABALBU 3.0 08/09/2020    LABALBU 3.8 02/20/2012    CALCIUM 7.6 08/09/2020    BILITOT 0.4 08/09/2020    ALKPHOS 59 08/09/2020    AST 93 08/09/2020    ALT 49 08/09/2020     BMP:    Lab Results   Component Value Date     08/09/2020    K 5.0 08/09/2020    K 2.9 08/03/2020     08/09/2020    CO2 24 08/09/2020    BUN 4 08/09/2020    LABALBU 3.0 08/09/2020    LABALBU 3.8 02/20/2012    CREATININE 1.1 08/09/2020    CALCIUM 7.6 08/09/2020    GFRAA 59 08/09/2020    LABGLOM 59 08/09/2020    GLUCOSE 103 08/09/2020    GLUCOSE 120 02/20/2012     Magnesium:    Lab Results   Component Value Date    MG 1.9 08/09/2020     Phosphorus:    Lab Results   Component Value Date    PHOS 2.0 08/09/2020     PT/INR:    Lab Results   Component Value Date    PROTIME 11.7 08/04/2020    INR 1.0 08/04/2020     PTT:    Lab Results   Component Value Date    APTT 26.5 08/04/2020   [APTT}     Assessment:    Patient Active Problem List   Diagnosis    Essential hypertension    [facility] kidney [facility] ([facility] [address]    Pancreatic lesion    Avascular necrosis of femoral head, left (HCC)    Diarrhea    Enteritis    Dehydration    CKD (chronic kidney disease) stage 3, GFR 30-59 ml/min ([facility])       Plan:    Blood pressure better today. Continue to adjust current medications  Secondary to dehydration. Cr stable at 1.1. Appreciate urology evaluation of renal cysts. Supplement electrolytes. Appreciate GI evaluation. Appreciate ortho evaluation. Continue conservative therapy for OA. Most likely viral. Back off diet. As above. IV fluids. Encourage orals. As above  Pt/Ot evaluations for discharge planning. Discharge soon.         Jeferson Rosario    8:18 AM  8/9/2020

## 2020-08-09 NOTE — PROGRESS NOTES
ROM and strength grossly WFL as noted through activity, but pt refused testign       Hand dominance: R    Hearing: WFL  Sensation:  No c/o numbness or tingling  Tone:  WFL  Edema: None noted                          Treatment: OT treatment provided this date includes:    ADL-  Instruction/training on safety and adapted techniques for completion of ADLs:     Mobility-  Instruction/training on safety and improved independence with bed mobility/functional transfers  and functional mobility.   Sitting EOB x 2 minutes to improve dynamic sitting balance and activity tolerance during ADLs.   Activity tolerance- Instruction/training on energy conservation/work simplification for completion of ADLs: Yenifer Staggers Comments: Upon arrival, patient sittign at edge of bed. At end of session, patient supine in bed  with call light and phone within reach, all lines and tubes intact. Pt demonstrating fair understanding of education/techniques. Patient would benefit from continued skilled OT  to improve safety, functional independence and quality of life.     Assessment of current deficits   Functional mobility [x]  ADLs [x] Strength [x]  Cognition []  Functional transfers  [x] IADLs [] Safety Awareness [x]  Endurance [x]  Fine Motor Coordination [] Balance [x] Vision/perception [] Sensation []   Gross Motor Coordination [] ROM [] Delirium []                  Motor Control []    Plan of Care:   ADL retraining [x]    Equipment needs [x]   Neuromuscular re-education [x]  Energy Conservation Techniques [x]  Functional Transfer training [x]  Patient and/or Family Education [x]  Functional Mobility training [x]              Environmental Modifications [x]  Cognitive re-training []    Compensatory techniques for ADLs [x]  Splinting Needs []    Positioning to improve overall function [x]   Therapeutic Activity [x]               Therapeutic Exercise  [x]  Visual/Perceptual: []     Delirium prevention/treatment  []   Other:

## 2020-08-09 NOTE — PROGRESS NOTES
despite clear liquid diet  -Stool culture negative  -Stool studies negative thus far with C. difficile pending (apparently not collected secondary to stool being mixed with urine  -Continue empiric antibiotics  -Adjusted diet to lactose controlled -advance to low fiber  -Continue cholestyramine as needed  -Consider nonemergen colonoscopy  -Consider antimotility agent such as Imodium if C. difficile negative, however without definitive result on the C. difficile testing renal failure recommend antimotility agent       3.  Anemia  -Acute without evidence of overt bleed  -Stable H&H after initial drop  -No evidence of overt bleed  -Suspect component of hemodilution  -Colonoscopy as above  -No iron deficiency; unremarkable RBC indices  -Positive FOBT initially with repeat occult blood negative  -In case of precipitous drop in H&H/overt bleed, may consider further evaluation with upper endoscopy in the hospital, otherwise plan for outpatient procedure  -Continue PPI     4.  Acute on chronic renal failure  -SAW on CKD likely related to dehydration secondary to poor oral intake/diarrhea  - persistently improving creatinine  -Nephrology input appreciated    NOTE:  This report was transcribed using voice recognition software. Every effort was made to ensure accuracy; however, inadvertent computerized transcription errors may be present.     Kelsi Munroe MD  8/9/2020  2:31 PM

## 2020-08-09 NOTE — PROGRESS NOTES
Patient in bed with room darkened d/t ongoing left eye pain. Blood pressure tonight is WNL. Still having loose stools. Patient does state that she's feeling much better today than she was yesterday. Temp slightly elevated, otherwise stable vitals tonight.

## 2020-08-10 ENCOUNTER — APPOINTMENT (OUTPATIENT)
Dept: MRI IMAGING | Age: 75
DRG: 683 | End: 2020-08-10
Attending: INTERNAL MEDICINE
Payer: MEDICARE

## 2020-08-10 LAB
ANION GAP SERPL CALCULATED.3IONS-SCNC: 16 MMOL/L (ref 7–16)
BASOPHILS ABSOLUTE: 0.03 E9/L (ref 0–0.2)
BASOPHILS RELATIVE PERCENT: 0.4 % (ref 0–2)
BUN BLDV-MCNC: 4 MG/DL (ref 8–23)
CALCIUM IONIZED: 1.1 MMOL/L (ref 1.15–1.33)
CALCIUM SERPL-MCNC: 8 MG/DL (ref 8.6–10.2)
CHLORIDE BLD-SCNC: 107 MMOL/L (ref 98–107)
CO2: 21 MMOL/L (ref 22–29)
CREAT SERPL-MCNC: 1.1 MG/DL (ref 0.5–1)
CREATININE URINE: 38 MG/DL (ref 29–226)
EOSINOPHILS ABSOLUTE: 0.14 E9/L (ref 0.05–0.5)
EOSINOPHILS RELATIVE PERCENT: 1.8 % (ref 0–6)
FECAL NEUTRAL FAT: NORMAL
FECAL SPLIT FATS: NORMAL
GFR AFRICAN AMERICAN: 59
GFR NON-AFRICAN AMERICAN: 59 ML/MIN/1.73
GLUCOSE BLD-MCNC: 116 MG/DL (ref 74–99)
HCT VFR BLD CALC: 32.6 % (ref 34–48)
HEMOGLOBIN: 10.7 G/DL (ref 11.5–15.5)
IMMATURE GRANULOCYTES #: 0.06 E9/L
IMMATURE GRANULOCYTES %: 0.8 % (ref 0–5)
LYMPHOCYTES ABSOLUTE: 2.72 E9/L (ref 1.5–4)
LYMPHOCYTES RELATIVE PERCENT: 34.6 % (ref 20–42)
MAGNESIUM: 1.5 MG/DL (ref 1.6–2.6)
MCH RBC QN AUTO: 26.7 PG (ref 26–35)
MCHC RBC AUTO-ENTMCNC: 32.8 % (ref 32–34.5)
MCV RBC AUTO: 81.3 FL (ref 80–99.9)
MICROALBUMIN UR-MCNC: 24.6 MG/L
MICROALBUMIN/CREAT UR-RTO: 64.7 (ref 0–30)
MONOCYTES ABSOLUTE: 0.68 E9/L (ref 0.1–0.95)
MONOCYTES RELATIVE PERCENT: 8.7 % (ref 2–12)
NEUTROPHILS ABSOLUTE: 4.23 E9/L (ref 1.8–7.3)
NEUTROPHILS RELATIVE PERCENT: 53.7 % (ref 43–80)
PDW BLD-RTO: 14.9 FL (ref 11.5–15)
PHOSPHORUS: 5 MG/DL (ref 2.5–4.5)
PHOSPHORUS: 5 MG/DL (ref 2.5–4.5)
PLATELET # BLD: 274 E9/L (ref 130–450)
PMV BLD AUTO: 10.3 FL (ref 7–12)
POTASSIUM SERPL-SCNC: 4 MMOL/L (ref 3.5–5)
RBC # BLD: 4.01 E12/L (ref 3.5–5.5)
SODIUM BLD-SCNC: 144 MMOL/L (ref 132–146)
WBC # BLD: 7.9 E9/L (ref 4.5–11.5)
WHITE BLOOD CELLS (WBC), STOOL: NORMAL

## 2020-08-10 PROCEDURE — 6360000002 HC RX W HCPCS: Performed by: NURSE PRACTITIONER

## 2020-08-10 PROCEDURE — 82330 ASSAY OF CALCIUM: CPT

## 2020-08-10 PROCEDURE — 97530 THERAPEUTIC ACTIVITIES: CPT

## 2020-08-10 PROCEDURE — 2580000003 HC RX 258: Performed by: INTERNAL MEDICINE

## 2020-08-10 PROCEDURE — 2580000003 HC RX 258: Performed by: NURSE PRACTITIONER

## 2020-08-10 PROCEDURE — A9579 GAD-BASE MR CONTRAST NOS,1ML: HCPCS | Performed by: RADIOLOGY

## 2020-08-10 PROCEDURE — 74183 MRI ABD W/O CNTR FLWD CNTR: CPT

## 2020-08-10 PROCEDURE — 85025 COMPLETE CBC W/AUTO DIFF WBC: CPT

## 2020-08-10 PROCEDURE — 6370000000 HC RX 637 (ALT 250 FOR IP): Performed by: INTERNAL MEDICINE

## 2020-08-10 PROCEDURE — 83735 ASSAY OF MAGNESIUM: CPT

## 2020-08-10 PROCEDURE — 1200000000 HC SEMI PRIVATE

## 2020-08-10 PROCEDURE — 6360000004 HC RX CONTRAST MEDICATION: Performed by: RADIOLOGY

## 2020-08-10 PROCEDURE — 36415 COLL VENOUS BLD VENIPUNCTURE: CPT

## 2020-08-10 PROCEDURE — 6370000000 HC RX 637 (ALT 250 FOR IP): Performed by: NURSE PRACTITIONER

## 2020-08-10 PROCEDURE — 84100 ASSAY OF PHOSPHORUS: CPT

## 2020-08-10 PROCEDURE — 6360000002 HC RX W HCPCS: Performed by: INTERNAL MEDICINE

## 2020-08-10 PROCEDURE — 80048 BASIC METABOLIC PNL TOTAL CA: CPT

## 2020-08-10 RX ORDER — AMLODIPINE BESYLATE 10 MG/1
10 TABLET ORAL DAILY
Qty: 30 TABLET | Refills: 0 | Status: SHIPPED | OUTPATIENT
Start: 2020-08-11 | End: 2021-03-05

## 2020-08-10 RX ORDER — MAGNESIUM SULFATE IN WATER 40 MG/ML
2 INJECTION, SOLUTION INTRAVENOUS ONCE
Status: COMPLETED | OUTPATIENT
Start: 2020-08-10 | End: 2020-08-10

## 2020-08-10 RX ORDER — LACTOBACILLUS RHAMNOSUS GG 10B CELL
1 CAPSULE ORAL DAILY
Qty: 14 CAPSULE | Refills: 0 | Status: SHIPPED | OUTPATIENT
Start: 2020-08-11 | End: 2021-03-05

## 2020-08-10 RX ORDER — LOSARTAN POTASSIUM 50 MG/1
50 TABLET ORAL DAILY
Qty: 30 TABLET | Refills: 0 | Status: SHIPPED | OUTPATIENT
Start: 2020-08-11

## 2020-08-10 RX ORDER — HYDRALAZINE HYDROCHLORIDE 50 MG/1
50 TABLET, FILM COATED ORAL EVERY 8 HOURS SCHEDULED
Qty: 90 TABLET | Refills: 0 | Status: SHIPPED | OUTPATIENT
Start: 2020-08-10 | End: 2021-03-05

## 2020-08-10 RX ORDER — CHOLESTYRAMINE 4 G/9G
1 POWDER, FOR SUSPENSION ORAL 2 TIMES DAILY
Qty: 28 PACKET | Refills: 0 | Status: SHIPPED | OUTPATIENT
Start: 2020-08-10 | End: 2021-03-05

## 2020-08-10 RX ADMIN — TIMOLOL MALEATE 1 DROP: 5 SOLUTION/ DROPS OPHTHALMIC at 20:28

## 2020-08-10 RX ADMIN — HYDRALAZINE HYDROCHLORIDE 50 MG: 50 TABLET ORAL at 21:57

## 2020-08-10 RX ADMIN — GADOTERIDOL 15 ML: 279.3 INJECTION, SOLUTION INTRAVENOUS at 12:35

## 2020-08-10 RX ADMIN — ALPRAZOLAM 1 MG: 1 TABLET ORAL at 21:57

## 2020-08-10 RX ADMIN — MULTIPLE VITAMINS W/ MINERALS TAB 1 TABLET: TAB at 08:42

## 2020-08-10 RX ADMIN — TIMOLOL MALEATE 1 DROP: 5 SOLUTION/ DROPS OPHTHALMIC at 08:42

## 2020-08-10 RX ADMIN — HEPARIN SODIUM 5000 UNITS: 10000 INJECTION INTRAVENOUS; SUBCUTANEOUS at 21:57

## 2020-08-10 RX ADMIN — Medication 250 MG: at 13:22

## 2020-08-10 RX ADMIN — METRONIDAZOLE 500 MG: 500 TABLET, FILM COATED ORAL at 06:14

## 2020-08-10 RX ADMIN — AMLODIPINE BESYLATE 10 MG: 10 TABLET ORAL at 08:48

## 2020-08-10 RX ADMIN — HEPARIN SODIUM 5000 UNITS: 10000 INJECTION INTRAVENOUS; SUBCUTANEOUS at 06:14

## 2020-08-10 RX ADMIN — Medication 10 ML: at 08:43

## 2020-08-10 RX ADMIN — LATANOPROST 1 DROP: 50 SOLUTION OPHTHALMIC at 20:28

## 2020-08-10 RX ADMIN — CLOPIDOGREL 75 MG: 75 TABLET, FILM COATED ORAL at 08:42

## 2020-08-10 RX ADMIN — BRIMONIDINE TARTRATE 1 DROP: 2 SOLUTION OPHTHALMIC at 20:28

## 2020-08-10 RX ADMIN — ATORVASTATIN CALCIUM 10 MG: 10 TABLET, FILM COATED ORAL at 20:28

## 2020-08-10 RX ADMIN — HYDRALAZINE HYDROCHLORIDE 50 MG: 50 TABLET ORAL at 13:20

## 2020-08-10 RX ADMIN — LOSARTAN POTASSIUM 50 MG: 50 TABLET ORAL at 08:42

## 2020-08-10 RX ADMIN — Medication 250 MG: at 00:10

## 2020-08-10 RX ADMIN — OXYCODONE HYDROCHLORIDE 10 MG: 10 TABLET, FILM COATED, EXTENDED RELEASE ORAL at 00:10

## 2020-08-10 RX ADMIN — CEFTRIAXONE 1 G: 1 INJECTION, POWDER, FOR SOLUTION INTRAMUSCULAR; INTRAVENOUS at 13:10

## 2020-08-10 RX ADMIN — Medication 10 ML: at 20:28

## 2020-08-10 RX ADMIN — HYDRALAZINE HYDROCHLORIDE 50 MG: 50 TABLET ORAL at 06:14

## 2020-08-10 RX ADMIN — Medication 500 MG: at 08:42

## 2020-08-10 RX ADMIN — VITAMIN D, TAB 1000IU (100/BT) 1000 UNITS: 25 TAB at 08:42

## 2020-08-10 RX ADMIN — BRIMONIDINE TARTRATE 1 DROP: 2 SOLUTION OPHTHALMIC at 08:42

## 2020-08-10 RX ADMIN — Medication 1 CAPSULE: at 08:42

## 2020-08-10 RX ADMIN — METRONIDAZOLE 500 MG: 500 TABLET, FILM COATED ORAL at 13:09

## 2020-08-10 RX ADMIN — Medication 250 MG: at 06:14

## 2020-08-10 RX ADMIN — METRONIDAZOLE 500 MG: 500 TABLET, FILM COATED ORAL at 21:57

## 2020-08-10 RX ADMIN — HEPARIN SODIUM 5000 UNITS: 10000 INJECTION INTRAVENOUS; SUBCUTANEOUS at 13:22

## 2020-08-10 RX ADMIN — Medication 250 MG: at 17:19

## 2020-08-10 RX ADMIN — MAGNESIUM SULFATE HEPTAHYDRATE 2 G: 40 INJECTION, SOLUTION INTRAVENOUS at 10:40

## 2020-08-10 RX ADMIN — OXYCODONE HYDROCHLORIDE 10 MG: 10 TABLET, FILM COATED, EXTENDED RELEASE ORAL at 13:10

## 2020-08-10 RX ADMIN — ALPRAZOLAM 1 MG: 1 TABLET ORAL at 10:28

## 2020-08-10 ASSESSMENT — PAIN DESCRIPTION - ONSET
ONSET: GRADUAL
ONSET: GRADUAL

## 2020-08-10 ASSESSMENT — PAIN DESCRIPTION - LOCATION
LOCATION: KNEE
LOCATION: KNEE

## 2020-08-10 ASSESSMENT — PAIN DESCRIPTION - DESCRIPTORS
DESCRIPTORS: ACHING;DISCOMFORT
DESCRIPTORS: ACHING;CONSTANT;DISCOMFORT

## 2020-08-10 ASSESSMENT — PAIN - FUNCTIONAL ASSESSMENT
PAIN_FUNCTIONAL_ASSESSMENT: PREVENTS OR INTERFERES SOME ACTIVE ACTIVITIES AND ADLS
PAIN_FUNCTIONAL_ASSESSMENT: ACTIVITIES ARE NOT PREVENTED

## 2020-08-10 ASSESSMENT — PAIN DESCRIPTION - ORIENTATION
ORIENTATION: RIGHT
ORIENTATION: RIGHT

## 2020-08-10 ASSESSMENT — PAIN DESCRIPTION - PROGRESSION
CLINICAL_PROGRESSION: NOT CHANGED

## 2020-08-10 ASSESSMENT — PAIN DESCRIPTION - FREQUENCY
FREQUENCY: CONTINUOUS
FREQUENCY: CONTINUOUS

## 2020-08-10 ASSESSMENT — PAIN SCALES - GENERAL
PAINLEVEL_OUTOF10: 9
PAINLEVEL_OUTOF10: 0
PAINLEVEL_OUTOF10: 4
PAINLEVEL_OUTOF10: 6

## 2020-08-10 ASSESSMENT — PAIN DESCRIPTION - PAIN TYPE
TYPE: ACUTE PAIN
TYPE: ACUTE PAIN

## 2020-08-10 NOTE — CARE COORDINATION
Pt for MRI today; renal labs much improved. Admitting to much fatigue today. Re- visited d/c plan with pt; plan is still home; declines any assistance needs,. Will continue to follow. Madeleine Wolff.

## 2020-08-10 NOTE — PROGRESS NOTES
Consult called to Dr. Dee Osman @ [facility]. They are familiar with pt. Will call back with time for consult.

## 2020-08-10 NOTE — PROGRESS NOTES
Physical Therapy    Facility/Department: [address] MED SURG/TELE  Treatment Note    NAME: Tristian Landeros  : 1945  MRN: [id number]    Date of Service: 8/10/2020    Name: Tristian Landeros  : 1945  MRN: [id number]    Referring Provider: Mercedes Russo MD      Date of Service: 8/10/2020    Evaluating PT:  Jay Caraballo, PT [id number]    Room #:  7016/3616-K  Diagnosis:  Acute renal failure, diarrhea  Precautions:  Fall risk  Equipment Needs:  None. Pt owns SPC    SUBJECTIVE:    Pt lives alone in a 1 story home with 0 stairs to enter and 0 rail. Bed and bath is on first floor. Pt ambulated with SPC PTA. OBJECTIVE:   Initial Evaluation  Date:  Treatment Short Term/ Long Term   Goals   Was pt agreeable to Eval/treatment? yes    Does pt have pain? Pt reported pain everywhere No c/o pain    Bed Mobility  Rolling: SBA  Supine to sit: NT  Sit to supine: SBA  Scooting: SBA to sitting EOB Rolling: Independent  Supine to sit: Independent  Sit to supine: Independent  Scooting: Independent independent   Transfers Sit to stand: SBA  Stand to sit: SBA  Stand pivot: NT Sit to stand: supervision  Stand to sit: supervision  Stand pivot: supervision with SPC independent   Ambulation    15 feet x 2 with SPC CGA. 15 feet x2 reps with SPC supervision 100 feet with SPC modified independent    Stair negotiation: ascended and descended  NT NA    AM-PAC 6 Clicks 80/23 92/88      BLE ROM is WFL  BLE Strength is grossly 4/5  Balance: sitting EOB Independent and standing with SPC supervision    ASSESSMENT:    Comments:  Pt was found in bed and requesting need to use BR. She walked with SPC to and from BR with supervision. She transferred on/off commode with supervision and was able to perform her own self care. Pt stood at the sink with supervision while she washed her hands. Pt had mild unsteady gait, but had no LOB. She c/o fatigue that limited amb distance.       Treatment:  Patient practiced and was instructed in the following treatment:     Bed mobility, transfers, ADLs, and gait to improve functional strength and endurance. Pt was left supine in bed with call light left by patient. Chair/bed alarm: bed alarm was activated. PLAN:    Pt is making good progress toward established Physical Therapy goals. Continue with physical therapy current plan of care. Time in  14:15  Time out  14:35    Total Treatment Time  20 minutes     Evaluation Time includes thorough review of current medical information, gathering information on past medical history/social history and prior level of function, completion of standardized testing/informal observation of tasks, assessment of data and education on plan of care and goals. CPT codes:  [] Low Complexity PT evaluation 35166  [] Moderate Complexity PT evaluation 52399  [] High Complexity PT evaluation 67905  [] PT Re-evaluation 60266  [] Gait training 96540 ** minutes  [] Manual therapy 04570 ** minutes  [x] Therapeutic activities 41561 20 minutes  [] Therapeutic exercises 13302 ** minutes  [] Neuromuscular reeducation 28358 ** minutes     Kalin Gunn., P.T.   License Number: [id number]

## 2020-08-10 NOTE — PLAN OF CARE
Problem: Falls - Risk of:  Goal: Will remain free from falls  Description: Will remain free from falls  8/10/2020 0636 by Nataliia Lewis RN  Outcome: Met This Shift  8/9/2020 1801 by Renuka Meyer RN  Outcome: Met This Shift  Goal: Absence of physical injury  Description: Absence of physical injury  8/10/2020 0636 by Nataliia Lewis RN  Outcome: Met This Shift  8/9/2020 1801 by Renuka Meyer RN  Outcome: Met This Shift     Problem: Discharge Planning:  Goal: Discharged to appropriate level of care  Description: Discharged to appropriate level of care  Outcome: Met This Shift     Problem: Fluid Volume - Imbalance:  Goal: Absence of imbalanced fluid volume signs and symptoms  Description: Absence of imbalanced fluid volume signs and symptoms  Outcome: Met This Shift     Problem: Musculor/Skeletal Functional Status  Goal: Highest potential functional level  Outcome: Met This Shift  Goal: Absence of falls  Outcome: Met This Shift

## 2020-08-10 NOTE — PLAN OF CARE
Problem: Falls - Risk of:  Goal: Will remain free from falls  Description: Will remain free from falls  8/10/2020 1602 by Willow Bull RN  Outcome: Met This Shift     Problem: Falls - Risk of:  Goal: Absence of physical injury  Description: Absence of physical injury  8/10/2020 1602 by Willow Bull RN  Outcome: Met This Shift     Problem: Musculor/Skeletal Functional Status  Goal: Absence of falls  8/10/2020 1602 by Willow Bull RN  Outcome: Met This Shift     Problem: Pain:  Description: Pain management should include both nonpharmacologic and pharmacologic interventions. Goal: Pain level will decrease  Description: Pain level will decrease  Outcome: Met This Shift     Problem: Pain:  Description: Pain management should include both nonpharmacologic and pharmacologic interventions.   Goal: Control of acute pain  Description: Control of acute pain  Outcome: Met This Shift     Problem: Musculor/Skeletal Functional Status  Goal: Highest potential functional level  8/10/2020 1602 by Willow Bull RN  Outcome: Ongoing

## 2020-08-10 NOTE — PROGRESS NOTES
Subjective: The patient is awake and alert. No problems overnight. Denies chest pain, angina, and dyspnea. Denies abdominal pain. Tolerating diet. No nausea or vomiting. No diarrhea. Still with left eye pain. /72   Pulse 82   Temp 98.8 °F (37.1 °C) (Oral)   Resp 16   Ht 5' 5\" (1.651 m)   Wt 161 lb 12.8 oz (73.4 kg)   SpO2 98%   BMI 26.92 kg/m²     Current medications that patient is taking have been reviewed. Heart:  RRR, no murmurs, gallops, or rubs.   Lungs:  CTA bilaterally, no wheeze, rales or rhonchi  Abd: bowel sounds present, soft, nontender, nondistended, no masses  Extrem:  No cyanosis or edema    CBC with Differential:    Lab Results   Component Value Date    WBC 7.9 08/10/2020    RBC 4.01 08/10/2020    HGB 10.7 08/10/2020    HCT 32.6 08/10/2020     08/10/2020    MCV 81.3 08/10/2020    MCH 26.7 08/10/2020    MCHC 32.8 08/10/2020    RDW 14.9 08/10/2020    NRBC 0.0 08/04/2020    SEGSPCT 38 02/20/2014    LYMPHOPCT 34.6 08/10/2020    MONOPCT 8.7 08/10/2020    EOSPCT 2 10/08/2010    BASOPCT 0.4 08/10/2020    MONOSABS 0.68 08/10/2020    LYMPHSABS 2.72 08/10/2020    EOSABS 0.14 08/10/2020    BASOSABS 0.03 08/10/2020     CMP:    Lab Results   Component Value Date     08/10/2020    K 4.0 08/10/2020    K 2.9 08/03/2020     08/10/2020    CO2 21 08/10/2020    BUN 4 08/10/2020    CREATININE 1.1 08/10/2020    GFRAA 59 08/10/2020    LABGLOM 59 08/10/2020    GLUCOSE 116 08/10/2020    GLUCOSE 120 02/20/2012    PROT 5.7 08/09/2020    LABALBU 3.0 08/09/2020    LABALBU 3.8 02/20/2012    CALCIUM 8.0 08/10/2020    BILITOT 0.4 08/09/2020    ALKPHOS 59 08/09/2020    AST 93 08/09/2020    ALT 49 08/09/2020     BMP:    Lab Results   Component Value Date     08/10/2020    K 4.0 08/10/2020    K 2.9 08/03/2020     08/10/2020    CO2 21 08/10/2020    BUN 4 08/10/2020    LABALBU 3.0 08/09/2020    LABALBU 3.8 02/20/2012    CREATININE 1.1 08/10/2020    CALCIUM 8.0 08/10/2020 GFRAA 59 08/10/2020    LABGLOM 59 08/10/2020    GLUCOSE 116 08/10/2020    GLUCOSE 120 02/20/2012     Magnesium:    Lab Results   Component Value Date    MG 1.5 08/10/2020     Phosphorus:    Lab Results   Component Value Date    PHOS 5.0 08/10/2020    PHOS 5.0 08/10/2020     PT/INR:    Lab Results   Component Value Date    PROTIME 11.7 08/04/2020    INR 1.0 08/04/2020     PTT:    Lab Results   Component Value Date    APTT 26.5 08/04/2020   [APTT}     Assessment:    Patient Active Problem List   Diagnosis    Essential hypertension    [facility] kidney [facility] ([facility] [address]    Pancreatic lesion    Avascular necrosis of femoral head, left ([facility])    Diarrhea    Enteritis    Dehydration    CKD (chronic kidney disease) stage 3, GFR 30-59 ml/min ([facility])       Plan:    Blood pressure better today. Continue to adjust current medications  Secondary to dehydration. Cr stable at 1.1. Appreciate urology evaluation of renal cysts. Supplement electrolytes. Appreciate GI evaluation. Appreciate ortho evaluation. Continue conservative therapy for OA. Most likely viral. Back off diet. As above. IV fluids. Encourage orals. As above  Pt/Ot evaluations for discharge planning. Discharge soon. Discharge after ophthalmology evaluation.         Sophia Curling    11:08 AM  8/10/2020

## 2020-08-10 NOTE — PROGRESS NOTES
secondary to stool being mixed with urine  -Continue empiric antibiotics  -Adjusted diet to lactose controlled -advance to low fiber  -Continue cholestyramine as needed  -Consider nonemergen colonoscopy  -Consider antimotility agent such as Imodium if C. difficile negative, however without definitive result on the C. difficile testing renal failure recommend antimotility agent        3.  Anemia  -Acute without evidence of overt bleed  -Stable H&H after initial drop  -No evidence of overt bleed  -Suspect component of hemodilution  -Colonoscopy as above  -No iron deficiency; unremarkable RBC indices  -Initially positive FOBT - repeat occult blood negative  -In case of precipitous drop in H&H/overt bleed, may consider further evaluation with upper endoscopy in the hospital, otherwise plan for outpatient procedure  -Continue PPI     4.  Acute on chronic renal failure  -SAW on CKD likely related to dehydration secondary to poor oral intake/diarrhea  - persistently improving creatinine  -Nephrology input appreciated    Depending on MRI results, okay to be discharged from GI POV to follow as outpatient in 2 to 3 weeks. -Call for appointment and with questions/concerns at [phone]. NOTE:  This report was transcribed using voice recognition software. Every effort was made to ensure accuracy; however, inadvertent computerized transcription errors may be present.     Francesco Shen MD  8/10/2020  3:52 PM

## 2020-08-10 NOTE — PROGRESS NOTES
Nephrology Progress Note  Patient's Name: Radha Jerome  2:54 PM  8/10/2020    Nephrologist: Rosaura Collins    Reason for Consult: SAW  Requesting Physician:  Javad Hunt MD    Chief Complaint:  Diarrhea    History Obtained From:  patient and past medical records    History of Present Ilness:    Radha Jerome is a 76 y.o. female with prior history of CKD G3 B baseline serum cr 1.4-1.5mg/dl with an e-GFR=41-44ml/min  who follows longitudinally in the office with Dr. Vinay Alvarado. Pt presented to the ED with the c/o diarrhea for the last 3-4 days with associated malaise and body aches. She also noted dizziness and lightheadedness. She had a decreased appetite as well. In the ED the cr 5.4mg/dl with a K+ 2.9. She is maintained on losartan HCTZ as an outptShe had a Ct Scan which showed a 1.5cm exophytic lesion in the L kidney which was hyperdense and a 1.6cm lesion in the pancreas    8/10/20: Pt states she feels better but still not quite herself    Past Medical History:   Diagnosis Date    Chronic renal disease, stage II 2012    Hypertension     Rhabdomyolysis 1/15/2013       Past Surgical History:   Procedure Laterality Date    BACK SURGERY       SECTION      X2    EYE SURGERY      left eye-4 or 5 surgeries, detatched retina x2, has a buckle in her left eye    HYSTERECTOMY      KNEE SURGERY      left knee    ROTATOR CUFF REPAIR      2017 right shoulder    TUBAL LIGATION         Family History   Problem Relation Age of Onset    Heart Disease Mother     Cancer Mother         reports that she has quit smoking. Her smoking use included cigarettes. She quit after 5.00 years of use. She has never used smokeless tobacco. She reports current alcohol use. She reports that she does not use drugs.     Allergies:  Nalbuphine and Vistaril [hydroxyzine hcl]    Current Medications:    labetalol (NORMODYNE;TRANDATE) injection 10 mg, Q4H PRN  hydrALAZINE (APRESOLINE) tablet 50 mg, 3 times per day  amLODIPine (NORVASC) tablet 10 mg, Daily  hydrALAZINE (APRESOLINE) injection 10 mg, Q6H PRN  losartan (COZAAR) tablet 50 mg, Daily  cholestyramine (QUESTRAN) packet 4 g, BID PRN  cefTRIAXone (ROCEPHIN) 1 g in sterile water 10 mL IV syringe, Q24H  vancomycin (VANCOCIN) oral solution 250 mg, 4 times per day  metroNIDAZOLE (FLAGYL) tablet 500 mg, 3 times per day  ALPRAZolam (XANAX) tablet 1 mg, TID PRN  ascorbic acid (VITAMIN C) tablet 500 mg, Daily  clopidogrel (PLAVIX) tablet 75 mg, Daily  latanoprost (XALATAN) 0.005 % ophthalmic solution 1 drop, Nightly  therapeutic multivitamin-minerals 1 tablet, Daily  vitamin D (CHOLECALCIFEROL) tablet 1,000 Units, Every Other Day  oxyCODONE (OXYCONTIN) extended release tablet 10 mg, 2 times per day  sodium chloride flush 0.9 % injection 10 mL, 2 times per day  sodium chloride flush 0.9 % injection 10 mL, PRN  acetaminophen (TYLENOL) tablet 650 mg, Q6H PRN    Or  acetaminophen (TYLENOL) suppository 650 mg, Q6H PRN  polyethylene glycol (GLYCOLAX) packet 17 g, Daily PRN  promethazine (PHENERGAN) tablet 12.5 mg, Q6H PRN    Or  ondansetron (ZOFRAN) injection 4 mg, Q6H PRN  heparin (porcine) injection 5,000 Units, 3 times per day  atorvastatin (LIPITOR) tablet 10 mg, Nightly  lactobacillus (CULTURELLE) capsule 1 capsule, Daily  brimonidine (ALPHAGAN) 0.2 % ophthalmic solution 1 drop, BID    And  timolol (TIMOPTIC) 0.5 % ophthalmic solution 1 drop, BID        Review of Systems:   Pertinent items are noted in HPI. Remainder of a complete review of systems is (-) other than stated in the HPI    Physical exam:   Constitutional:  Elderly female in NAD  Vitals:   VITALS:  /72   Pulse 82   Temp 98.8 °F (37.1 °C) (Oral)   Resp 16   Ht 5' 5\" (1.651 m)   Wt 161 lb 12.8 oz (73.4 kg)   SpO2 98%   BMI 26.92 kg/m²   24HR INTAKE/OUTPUT:    No intake or output data in the 24 hours ending 08/10/20 5635  URINARY CATHETER OUTPUT ([place]):     DRAIN/TUBE OUTPUT:     VENT SETTINGS:  Vent Information  SpO2: 98 %  Additional Respiratory  Assessments  Pulse: 82  Resp: 16  SpO2: 98 %  Oral Care: Teeth brushed    Skin: no rash, turgor wnl  Heent:  eomi, mmm  Neck: no bruits or jvd noted  Cardiovascular: PMI not lat displaced   S1, S2 without S3 or a rub  Respiratory: CTA B without w/r/r  Abdomen:  +bs, soft, nt, nd  Ext: (-) bilat  lower extremity edema  Psychiatric: mood and affect appropriate, cr nr 2-12 grossly intact  Musculoskeletal:  Rom, muscular strength intact    Data:   Labs:  CBC:   Lab Results   Component Value Date    WBC 7.9 08/10/2020    RBC 4.01 08/10/2020    HGB 10.7 08/10/2020    HCT 32.6 08/10/2020    MCV 81.3 08/10/2020    MCH 26.7 08/10/2020    MCHC 32.8 08/10/2020    RDW 14.9 08/10/2020     08/10/2020    MPV 10.3 08/10/2020     CBC with Differential:    Lab Results   Component Value Date    WBC 7.9 08/10/2020    RBC 4.01 08/10/2020    HGB 10.7 08/10/2020    HCT 32.6 08/10/2020     08/10/2020    MCV 81.3 08/10/2020    MCH 26.7 08/10/2020    MCHC 32.8 08/10/2020    RDW 14.9 08/10/2020    NRBC 0.0 08/04/2020    SEGSPCT 38 02/20/2014    LYMPHOPCT 34.6 08/10/2020    MONOPCT 8.7 08/10/2020    EOSPCT 2 10/08/2010    BASOPCT 0.4 08/10/2020    MONOSABS 0.68 08/10/2020    LYMPHSABS 2.72 08/10/2020    EOSABS 0.14 08/10/2020    BASOSABS 0.03 08/10/2020     Hemoglobin/Hematocrit:    Lab Results   Component Value Date    HGB 10.7 08/10/2020    HCT 32.6 08/10/2020     CMP:    Lab Results   Component Value Date     08/10/2020    K 4.0 08/10/2020    K 2.9 08/03/2020     08/10/2020    CO2 21 08/10/2020    BUN 4 08/10/2020    CREATININE 1.1 08/10/2020    GFRAA 59 08/10/2020    LABGLOM 59 08/10/2020    GLUCOSE 116 08/10/2020    GLUCOSE 120 02/20/2012    PROT 5.7 08/09/2020    LABALBU 3.0 08/09/2020    LABALBU 3.8 02/20/2012    CALCIUM 8.0 08/10/2020    BILITOT 0.4 08/09/2020    ALKPHOS 59 08/09/2020    AST 93 08/09/2020    ALT 49 08/09/2020     BMP:    Lab Results   Component Value Date     08/10/2020    K 4.0 08/10/2020    K 2.9 08/03/2020     08/10/2020    CO2 21 08/10/2020    BUN 4 08/10/2020    LABALBU 3.0 08/09/2020    LABALBU 3.8 02/20/2012    CREATININE 1.1 08/10/2020    CALCIUM 8.0 08/10/2020    GFRAA 59 08/10/2020    LABGLOM 59 08/10/2020    GLUCOSE 116 08/10/2020    GLUCOSE 120 02/20/2012     BUN/Creatinine:    Lab Results   Component Value Date    BUN 4 08/10/2020    CREATININE 1.1 08/10/2020     Hepatic Function Panel:    Lab Results   Component Value Date    ALKPHOS 59 08/09/2020    ALT 49 08/09/2020    AST 93 08/09/2020    PROT 5.7 08/09/2020    BILITOT 0.4 08/09/2020    BILIDIR 0.2 01/15/2013    LABALBU 3.0 08/09/2020    LABALBU 3.8 02/20/2012     Albumin:    Lab Results   Component Value Date    LABALBU 3.0 08/09/2020    LABALBU 3.8 02/20/2012     Calcium:    Lab Results   Component Value Date    CALCIUM 8.0 08/10/2020     Ionized Calcium:  No results found for: IONCA  Magnesium:    Lab Results   Component Value Date    MG 1.5 08/10/2020     Phosphorus:    Lab Results   Component Value Date    PHOS 5.0 08/10/2020    PHOS 5.0 08/10/2020     LDH:  No results found for: LDH  Uric Acid:  No results found for: LABURIC, URICACID  PT/INR:    Lab Results   Component Value Date    PROTIME 11.7 08/04/2020    INR 1.0 08/04/2020     PTT:    Lab Results   Component Value Date    APTT 26.5 08/04/2020   [APTT}  Troponin:    Lab Results   Component Value Date    TROPONINI 0.02 01/15/2013    TROPONINI <0.01 02/15/2012     U/A:    Lab Results   Component Value Date    COLORU Yellow 08/03/2020    PROTEINU TRACE 08/03/2020    PHUR 5.0 08/03/2020    LABCAST FEW  01/15/2013    WBCUA NONE 08/03/2020    RBCUA NONE 08/03/2020    RBCUA NONE 01/15/2013    BACTERIA NONE SEEN 08/03/2020    CLARITYU Clear 08/03/2020    SPECGRAV 1.020 08/03/2020    LEUKOCYTESUR Negative 08/03/2020    UROBILINOGEN 0.2 08/03/2020    BILIRUBINUR Negative 08/03/2020    BLOODU Negative 08/03/2020    GLUCOSEU Negative 2020    AMORPHOUS FEW 01/15/2013     ABG:  No results found for: PH, PCO2, PO2, HCO3, BE, THGB, TCO2, O2SAT  HgBA1c:    Lab Results   Component Value Date    LABA1C 6.4 2020     Microalbumen/Creatinine ratio:  No components found for: RUCREAT  FLP:    Lab Results   Component Value Date    TRIG 314 2020    HDL 16 2020    LDLCALC 114 2020    LABVLDL 63 2020     TSH:    Lab Results   Component Value Date    TSH 4.420 2014     VITAMIN B12: No components found for: B12  FOLATE:    Lab Results   Component Value Date    FOLATE 12.0 2020     Iron Saturation:  No components found for: PERCENTFE  FERRITIN:    Lab Results   Component Value Date    FERRITIN 627 2020     AMYLASE:    Lab Results   Component Value Date    AMYLASE 96 10/28/2014     LIPASE:    Lab Results   Component Value Date    LIPASE 44 2020     Fibrinogen Level:  No components found for: FIB  24 Hour Urine for Protein:  No components found for: RAWUPRO, UHRS3, MUIY52HJ, UTV3  24 Hour Urine for Creatinine Clearance:  No components found for: Velton Sitter, UTV10     Imaging:  Patient MRN: [id number]    : 1945    Age:  74 years    Gender: Female    Order Date: 2020 6:35 AM    Exam: XR CHEST PORTABLE    Number of Images: 1 view    Indication:   Cough    Cough    Comparison: 2018         FINDINGS:         Heart and pulmonary vascularity normal. Lungs clear. Costophrenic    angles sharp. Normal aorta.                   Impression    No acute cardiopulmonary findings. Patient MRN:  [id number]    : 1945    Age: 76 years    Gender: Female    Order Date:  8/3/2020 4:36 PM    EXAM: CT ABDOMEN PELVIS WO CONTRAST    INDICATION:  acute kidney injury    acute kidney injury      COMPARISON: CT the abdomen and pelvis, 2010         Technique: Low-dose CT  acquisition technique included one of    following options; 1 . Automated exposure control, 2.  Adjustment of MA and or KV according to patient's size or 3. Use of iterative    reconstruction.         Multiple computerized tomography sections of the abdomen and pelvis    with sagittal and coronal MPR reconstructions were obtained from the    top of the diaphragm to the pelvis.                FINDINGS:         LOWER THORAX: Unremarkable. LIVER: Unremarkable. GALLBLADDER:Unremarkable    SPLEEN:Unremarkable. ADRENALS:Unremarkable. KIDNEYS: The kidneys are normal in size. No stone or hydronephrosis is    seen. Multiple small lesions are seen in the kidneys, which may    represent cysts. A 1.5 cm exophytic hyperdense lesion along the upper    pole of the left kidney may represent a solid lesion. Evaluation with sonography is recommended. PANCREAS: 1.6 cm hypodense lesion is seen in the region of the    uncinate process. It was not visualized on the prior CT from 2010    BOWEL: No bowel wall thickening or obstruction. APPENDIX:Unremarkable. BLADDER:Unremarkable. REPRODUCTIVE ORGANS: Status post hysterectomy. VASCULATURE:Mild calcified atherosclerosis seen in the aorta. No    aneurysm. LYMPH NODES:Unremarkable. BONES:Evaluation of the bones reveals no fracture or joint    dislocation. Prominent sclerosis along the left femoral head likely    due to avascular necrosis. Severe loss of disc height at L5-S1. Ronold Kanner MISCELLANEOUS:No additional finding.              Impression              1. No acute abnormality seen in the abdomen or the pelvis. 2. Bilateral renal lesions, including a 1.6 cm hyperdense lesion on    the left.      3. 1.6 and a hypodense lesion in the uncinate process of the pancreas. 4.. Postcontrast enhanced MRI of the abdomen is recommended to    evaluate both the renal and pancreatic lesions.       US RETROPERITONEAL COMPLETE [[id number]]      Resulted: 20 165     Updated: 20     Narrative:      Patient Mrn: [id number]   : 1945   Age:  74 years Gender: Female   Order Date: 8/4/2020 4:21 PM     Exam: US RETROPERITONEAL COMPLETE     Number Of Images: 46 Views     Indication:  Bilateral renal probable cysts reported by CT Abdomen and   Pelvis noncontrast imaging August 3, 2020     Comparison: Noncontrast CT abdomen and pelvis study August 3, 2020     TECHNIQUE: 2-D grayscale and color Doppler imaging were utilized for   evaluation of the kidneys and bladder. Findings: The right kidney uzrcalod69.5 x 4.3 x 3.7 cm, and the left   kidney measures 9.3 x 4.5 x 5.0 cm. There is no evidence of collecting system calcification, obstructive   dilation, or perinephric inflammatory change. Cortical echogenicity is   increased bilaterally, with some cortical thinning on the right,   suggesting the spectrum of medical renal disease. Cysts in the upper   pole and mid right kidney measured 2.2 and 1.2 cm diameter,   respectively. . An upper pole cyst of the left kidney measures 1.8 cm   maximum diameter. Central perfusion is intact in both kidneys by color   Doppler imaging. Images of the bladder show an estimated volume of 314 mL. The post   void residual was not measured. Bladder mural contours appear normal,   and no intraluminal abnormalities are evident. Impression:      Both kidneys show some increased cortical echogenicity suggesting the   spectrum of medical renal disease. Multifocal bilateral cysts, with   smaller bilateral cysts not enumerated above, right common finding in   patients with medical renal disease. Retrospective review of the CT   study from August 3, 2020 suggests that the posterior mid left renal   echogenic finding is likely a small hemorrhagic cyst. On this   examination, several of the anechoic cystic structures do not show   good through transmission/shadowing, which may be artifact related to   habitus and depth of the cysts from the skin surface.  If further   evaluation is felt to be clinically necessary on the basis of

## 2020-08-11 VITALS
DIASTOLIC BLOOD PRESSURE: 65 MMHG | TEMPERATURE: 97.7 F | HEART RATE: 84 BPM | BODY MASS INDEX: 26.26 KG/M2 | SYSTOLIC BLOOD PRESSURE: 129 MMHG | OXYGEN SATURATION: 98 % | HEIGHT: 65 IN | RESPIRATION RATE: 16 BRPM | WEIGHT: 157.6 LBS

## 2020-08-11 LAB
ALBUMIN SERPL-MCNC: 3.1 G/DL (ref 3.5–5.2)
ALP BLD-CCNC: 74 U/L (ref 35–104)
ALT SERPL-CCNC: 44 U/L (ref 0–32)
ANION GAP SERPL CALCULATED.3IONS-SCNC: 12 MMOL/L (ref 7–16)
AST SERPL-CCNC: 42 U/L (ref 0–31)
BILIRUB SERPL-MCNC: 0.3 MG/DL (ref 0–1.2)
BUN BLDV-MCNC: 7 MG/DL (ref 8–23)
CALCIUM SERPL-MCNC: 8.3 MG/DL (ref 8.6–10.2)
CHLORIDE BLD-SCNC: 108 MMOL/L (ref 98–107)
CO2: 24 MMOL/L (ref 22–29)
CREAT SERPL-MCNC: 1.1 MG/DL (ref 0.5–1)
GFR AFRICAN AMERICAN: 59
GFR NON-AFRICAN AMERICAN: 59 ML/MIN/1.73
GLUCOSE BLD-MCNC: 122 MG/DL (ref 74–99)
HCT VFR BLD CALC: 30.8 % (ref 34–48)
HEMOGLOBIN: 10.1 G/DL (ref 11.5–15.5)
MAGNESIUM: 1.7 MG/DL (ref 1.6–2.6)
MCH RBC QN AUTO: 26.9 PG (ref 26–35)
MCHC RBC AUTO-ENTMCNC: 32.8 % (ref 32–34.5)
MCV RBC AUTO: 81.9 FL (ref 80–99.9)
PDW BLD-RTO: 15.2 FL (ref 11.5–15)
PHOSPHORUS: 3.4 MG/DL (ref 2.5–4.5)
PLATELET # BLD: 241 E9/L (ref 130–450)
PMV BLD AUTO: 10.3 FL (ref 7–12)
POTASSIUM SERPL-SCNC: 3.9 MMOL/L (ref 3.5–5)
RBC # BLD: 3.76 E12/L (ref 3.5–5.5)
SODIUM BLD-SCNC: 144 MMOL/L (ref 132–146)
TOTAL PROTEIN: 5.7 G/DL (ref 6.4–8.3)
WBC # BLD: 6.6 E9/L (ref 4.5–11.5)

## 2020-08-11 PROCEDURE — 80053 COMPREHEN METABOLIC PANEL: CPT

## 2020-08-11 PROCEDURE — 85027 COMPLETE CBC AUTOMATED: CPT

## 2020-08-11 PROCEDURE — 6370000000 HC RX 637 (ALT 250 FOR IP): Performed by: INTERNAL MEDICINE

## 2020-08-11 PROCEDURE — 36415 COLL VENOUS BLD VENIPUNCTURE: CPT

## 2020-08-11 PROCEDURE — 2580000003 HC RX 258: Performed by: NURSE PRACTITIONER

## 2020-08-11 PROCEDURE — 84100 ASSAY OF PHOSPHORUS: CPT

## 2020-08-11 PROCEDURE — 6370000000 HC RX 637 (ALT 250 FOR IP): Performed by: NURSE PRACTITIONER

## 2020-08-11 PROCEDURE — 6370000000 HC RX 637 (ALT 250 FOR IP): Performed by: HOSPITALIST

## 2020-08-11 PROCEDURE — 6360000002 HC RX W HCPCS: Performed by: NURSE PRACTITIONER

## 2020-08-11 PROCEDURE — 83735 ASSAY OF MAGNESIUM: CPT

## 2020-08-11 PROCEDURE — 6360000002 HC RX W HCPCS: Performed by: INTERNAL MEDICINE

## 2020-08-11 PROCEDURE — 2580000003 HC RX 258: Performed by: INTERNAL MEDICINE

## 2020-08-11 PROCEDURE — 6370000000 HC RX 637 (ALT 250 FOR IP): Performed by: OPHTHALMOLOGY

## 2020-08-11 RX ORDER — CYCLOPENTOLATE HYDROCHLORIDE 10 MG/ML
1 SOLUTION/ DROPS OPHTHALMIC 2 TIMES DAILY
Qty: 1 ML | Refills: 0 | Status: SHIPPED | OUTPATIENT
Start: 2020-08-11 | End: 2020-08-21

## 2020-08-11 RX ORDER — CYCLOPENTOLATE HYDROCHLORIDE 10 MG/ML
1 SOLUTION/ DROPS OPHTHALMIC 2 TIMES DAILY
Status: DISCONTINUED | OUTPATIENT
Start: 2020-08-11 | End: 2020-08-11 | Stop reason: HOSPADM

## 2020-08-11 RX ORDER — PREDNISOLONE ACETATE 10 MG/ML
1 SUSPENSION/ DROPS OPHTHALMIC 3 TIMES DAILY
Qty: 1 BOTTLE | Refills: 0 | Status: SHIPPED | OUTPATIENT
Start: 2020-08-11 | End: 2021-03-05

## 2020-08-11 RX ORDER — PREDNISOLONE ACETATE 10 MG/ML
1 SUSPENSION/ DROPS OPHTHALMIC 3 TIMES DAILY
Status: DISCONTINUED | OUTPATIENT
Start: 2020-08-11 | End: 2020-08-11 | Stop reason: HOSPADM

## 2020-08-11 RX ADMIN — PROMETHAZINE HYDROCHLORIDE 12.5 MG: 25 TABLET ORAL at 15:18

## 2020-08-11 RX ADMIN — MULTIPLE VITAMINS W/ MINERALS TAB 1 TABLET: TAB at 08:58

## 2020-08-11 RX ADMIN — PREDNISOLONE ACETATE 1 DROP: 10 SUSPENSION/ DROPS OPHTHALMIC at 14:38

## 2020-08-11 RX ADMIN — HYDRALAZINE HYDROCHLORIDE 50 MG: 50 TABLET ORAL at 14:39

## 2020-08-11 RX ADMIN — TIMOLOL MALEATE 1 DROP: 5 SOLUTION/ DROPS OPHTHALMIC at 08:56

## 2020-08-11 RX ADMIN — Medication 1 CAPSULE: at 08:58

## 2020-08-11 RX ADMIN — HEPARIN SODIUM 5000 UNITS: 10000 INJECTION INTRAVENOUS; SUBCUTANEOUS at 06:29

## 2020-08-11 RX ADMIN — Medication 250 MG: at 12:35

## 2020-08-11 RX ADMIN — CEFTRIAXONE 1 G: 1 INJECTION, POWDER, FOR SOLUTION INTRAMUSCULAR; INTRAVENOUS at 12:38

## 2020-08-11 RX ADMIN — METRONIDAZOLE 500 MG: 500 TABLET, FILM COATED ORAL at 14:39

## 2020-08-11 RX ADMIN — HYDRALAZINE HYDROCHLORIDE 50 MG: 50 TABLET ORAL at 06:30

## 2020-08-11 RX ADMIN — METRONIDAZOLE 500 MG: 500 TABLET, FILM COATED ORAL at 06:29

## 2020-08-11 RX ADMIN — Medication 250 MG: at 06:30

## 2020-08-11 RX ADMIN — CHOLESTYRAMINE 4 G: 4 POWDER, FOR SUSPENSION ORAL at 10:43

## 2020-08-11 RX ADMIN — CYCLOPENTOLATE HYDROCHLORIDE 1 DROP: 10 SOLUTION/ DROPS OPHTHALMIC at 14:38

## 2020-08-11 RX ADMIN — Medication 250 MG: at 02:03

## 2020-08-11 RX ADMIN — PREDNISOLONE ACETATE 1 DROP: 10 SUSPENSION/ DROPS OPHTHALMIC at 10:42

## 2020-08-11 RX ADMIN — HEPARIN SODIUM 5000 UNITS: 10000 INJECTION INTRAVENOUS; SUBCUTANEOUS at 14:39

## 2020-08-11 RX ADMIN — LOSARTAN POTASSIUM 50 MG: 50 TABLET ORAL at 08:58

## 2020-08-11 RX ADMIN — AMLODIPINE BESYLATE 10 MG: 10 TABLET ORAL at 08:58

## 2020-08-11 RX ADMIN — Medication 10 ML: at 09:01

## 2020-08-11 RX ADMIN — Medication 500 MG: at 08:58

## 2020-08-11 RX ADMIN — BRIMONIDINE TARTRATE 1 DROP: 2 SOLUTION OPHTHALMIC at 08:56

## 2020-08-11 RX ADMIN — CLOPIDOGREL 75 MG: 75 TABLET, FILM COATED ORAL at 08:58

## 2020-08-11 ASSESSMENT — PAIN SCALES - GENERAL
PAINLEVEL_OUTOF10: 0
PAINLEVEL_OUTOF10: 0

## 2020-08-11 NOTE — PLAN OF CARE
Pertinent labs/notes reviewed. MRI reviewed. Okay to be discharged from GI POV with follow-up in 2 to 3 weeks after discharge. Patient to call for appointment and with questions/concerns at [phone]. Thank you for the opportunity to participate in the care of Mrs. Tinajero.     Rosalinda Stephen MD  8/11/2020  12:40 PM

## 2020-08-11 NOTE — CONSULTS
Alena Daniels  [id number]  Rudy Sheets    Re:   INPATIENT OPHTHALMOLOGY CONSULTATION. [facility]      The patient is a 76 y.o. female who was admitted with Enteritis . Left eye pain and light sensitivity. Past Medical History:   Diagnosis Date    Chronic renal disease, stage II 2012    Hypertension     Rhabdomyolysis 1/15/2013     Past Surgical History:   Procedure Laterality Date    BACK SURGERY       SECTION      X2    EYE SURGERY      left eye-4 or 5 surgeries, detatched retina x2, has a buckle in her left eye    HYSTERECTOMY      KNEE SURGERY      left knee    ROTATOR CUFF REPAIR      2017 right shoulder    TUBAL LIGATION       Allergies   Allergen Reactions    Nalbuphine Other (See Comments)     Does not feel good, racing,personality change.     Vistaril [Hydroxyzine Hcl]        Current Facility-Administered Medications:     prednisoLONE acetate (PRED FORTE) 1 % ophthalmic suspension 1 drop, 1 drop, Left Eye, TID, Nia Norman MD, 1 drop at 20 1042    labetalol (NORMODYNE;TRANDATE) injection 10 mg, 10 mg, Intravenous, Q4H PRN, Manjinder Sarmiento MD, 10 mg at 20 0649    hydrALAZINE (APRESOLINE) tablet 50 mg, 50 mg, Oral, 3 times per day, Felicia Osullivan MD, 50 mg at 20 0630    amLODIPine (NORVASC) tablet 10 mg, 10 mg, Oral, Daily, Azeem Miller MD, 10 mg at 20 0858    hydrALAZINE (APRESOLINE) injection 10 mg, 10 mg, Intravenous, Q6H PRN, Azeem Miller MD, 10 mg at 20 8501    losartan (COZAAR) tablet 50 mg, 50 mg, Oral, Daily, Michelle Garcias MD, 50 mg at 20 0858    cholestyramine (QUESTRAN) packet 4 g, 1 packet, Oral, BID PRN, Leonila Ahn MD, 4 g at 20 1043    cefTRIAXone (ROCEPHIN) 1 g in sterile water 10 mL IV syringe, 1 g, Intravenous, Q24H, Azeem Miller MD, 1 g at 08/10/20 1310    vancomycin (VANCOCIN) oral solution 250 mg, 250 mg, Oral, 4 times per day, Azeem Miller MD, 250 mg at 20 0630   metroNIDAZOLE (FLAGYL) tablet 500 mg, 500 mg, Oral, 3 times per day, Eva Morataya MD, 500 mg at 08/11/20 9040    ALPRAZolam Menendez Cardinal) tablet 1 mg, 1 mg, Oral, TID PRN, SORIN Villarreal - CNP, 1 mg at 08/10/20 2157    ascorbic acid (VITAMIN C) tablet 500 mg, 500 mg, Oral, Daily, SORIN Villarreal - CNP, 500 mg at 08/11/20 8337    clopidogrel (PLAVIX) tablet 75 mg, 75 mg, Oral, Daily, SORIN Villarreal CNP, 75 mg at 08/11/20 0858    latanoprost (XALATAN) 0.005 % ophthalmic solution 1 drop, 1 drop, Both Eyes, Nightly, SORIN Villarreal CNP, 1 drop at 08/10/20 2028    therapeutic multivitamin-minerals 1 tablet, 1 tablet, Oral, Daily, SORIN Villarreal - CNP, 1 tablet at 08/11/20 2351    vitamin D (CHOLECALCIFEROL) tablet 1,000 Units, 1,000 Units, Oral, Every Other Day, SORIN Villarreal - CNP, 1,000 Units at 08/10/20 9867    oxyCODONE (OXYCONTIN) extended release tablet 10 mg, 10 mg, Oral, 2 times per day, SORIN Villarreal - CNP, 10 mg at 08/10/20 1310    sodium chloride flush 0.9 % injection 10 mL, 10 mL, Intravenous, 2 times per day, SORIN Villarreal - CNP, 10 mL at 08/11/20 0901    sodium chloride flush 0.9 % injection 10 mL, 10 mL, Intravenous, PRN, SORIN Villarreal - CNP    acetaminophen (TYLENOL) tablet 650 mg, 650 mg, Oral, Q6H PRN, 650 mg at 08/08/20 0520 **OR** acetaminophen (TYLENOL) suppository 650 mg, 650 mg, Rectal, Q6H PRN, SORIN Villarreal - CNP    polyethylene glycol (GLYCOLAX) packet 17 g, 17 g, Oral, Daily PRN, SORIN Villarreal - CNP    promethazine (PHENERGAN) tablet 12.5 mg, 12.5 mg, Oral, Q6H PRN, 12.5 mg at 08/05/20 2149 **OR** ondansetron (ZOFRAN) injection 4 mg, 4 mg, Intravenous, Q6H PRN, SORIN Villarreal - CNP, 4 mg at 08/05/20 1202    heparin (porcine) injection 5,000 Units, 5,000 Units, Subcutaneous, 3 times per day, SORIN Villarreal - CNP, 5,000 Units at 08/11/20

## 2020-08-11 NOTE — PROGRESS NOTES
Subjective: The patient is awake and alert. No problems overnight. Denies chest pain, angina, and dyspnea. Denies abdominal pain. Tolerating diet. No nausea or vomiting. No diarrhea. Still with left eye pain. BP (!) 143/70   Pulse 76   Temp 97.7 °F (36.5 °C) (Oral)   Resp 16   Ht 5' 5\" (1.651 m)   Wt 157 lb 9.6 oz (71.5 kg)   SpO2 98%   BMI 26.23 kg/m²     Current medications that patient is taking have been reviewed. Heart:  RRR, no murmurs, gallops, or rubs.   Lungs:  CTA bilaterally, no wheeze, rales or rhonchi  Abd: bowel sounds present, soft, nontender, nondistended, no masses  Extrem:  No cyanosis or edema    CBC with Differential:    Lab Results   Component Value Date    WBC 6.6 08/11/2020    RBC 3.76 08/11/2020    HGB 10.1 08/11/2020    HCT 30.8 08/11/2020     08/11/2020    MCV 81.9 08/11/2020    MCH 26.9 08/11/2020    MCHC 32.8 08/11/2020    RDW 15.2 08/11/2020    NRBC 0.0 08/04/2020    SEGSPCT 38 02/20/2014    LYMPHOPCT 34.6 08/10/2020    MONOPCT 8.7 08/10/2020    EOSPCT 2 10/08/2010    BASOPCT 0.4 08/10/2020    MONOSABS 0.68 08/10/2020    LYMPHSABS 2.72 08/10/2020    EOSABS 0.14 08/10/2020    BASOSABS 0.03 08/10/2020     CMP:    Lab Results   Component Value Date     08/11/2020    K 3.9 08/11/2020    K 2.9 08/03/2020     08/11/2020    CO2 24 08/11/2020    BUN 7 08/11/2020    CREATININE 1.1 08/11/2020    GFRAA 59 08/11/2020    LABGLOM 59 08/11/2020    GLUCOSE 122 08/11/2020    GLUCOSE 120 02/20/2012    PROT 5.7 08/11/2020    LABALBU 3.1 08/11/2020    LABALBU 3.8 02/20/2012    CALCIUM 8.3 08/11/2020    BILITOT 0.3 08/11/2020    ALKPHOS 74 08/11/2020    AST 42 08/11/2020    ALT 44 08/11/2020     BMP:    Lab Results   Component Value Date     08/11/2020    K 3.9 08/11/2020    K 2.9 08/03/2020     08/11/2020    CO2 24 08/11/2020    BUN 7 08/11/2020    LABALBU 3.1 08/11/2020    LABALBU 3.8 02/20/2012    CREATININE 1.1 08/11/2020    CALCIUM 8.3 08/11/2020

## 2020-08-11 NOTE — ADT AUTH CERT
Utilization Reviews         Dehydration - Care Day 8 (8/11/2020) by Stas Durand RN         Review Status  Review Entered    Completed  8/11/2020 14:28        Criteria Review       Care Day: 8 Care Date: 8/11/2020 Level of Care:    Guideline Day 2    Level Of Care    ( ) Floor to discharge [B]    8/11/2020 2:28 PM EDT by Fidel Roque      8/11 - remains on tele unit    Clinical Status    ( ) * Hemodynamic stability    (X) * Mental status at baseline    8/11/2020 2:28 PM EDT by Jamil Arellano a&o    ( ) * Vomiting absent or controlled    ( ) * Electrolyte levels normal or acceptable for outpatient follow-up    ( ) * Cause of dehydration requiring inpatient treatment absent    ( ) * Renal function at baseline or improved    ( ) * Discharge plans and education understood    Activity    ( ) * Ambulatory    Routes    ( ) * Oral hydration    ( ) * Liquid or advanced diet    (X) Oral or parenteral medications    * Milestone    Additional Notes    8/11    remains on tele unit    97.7 16 76 143/70    98% RA  SR on monitor       cl 108    bs 122    bun 7  cr. 1.1    alt 44  ast 42    h&h 10.1/30.8         RN NOTE:    Pt has had episodes of diarrhea x2 this morning. Administered questran packet, will continue to monitor. OPHTHALMOLOGY A/P:    The patient is a 76 y.o. female who was admitted with    Enteritis. Left eye pain and light sensitivity. EXTERNAL EXAM: Lids  WNL OU. No Ptosis OU. Pupils Equally round and reactive to light OU with no APD. Extraocular motility: Intact OU. No nystagmus. Confrontational visual fields: Full OU. Tactile tension less than 20 mmHg OU.         ANTERIOR SEGMENT EXAM:    Conjunctiva white and quiet OU. Cornea clear OU. Anterior chamber deep and clear OU with No hyphema.         POSTERIOR SEGMENT EXAM:    Posterior pole WNL OU. No vitreous hemorrhage OU.         Assessment/ Rec. Glaucoma. Continue Combigan and Latanaprost OU. Iritis OS.  PF 1% OS tid. Cyclogel 1 % OS bid. RENAL A/P:    Pt states this AM she had 2 episodes of diarrhea    and still feels weak.        1-Stage III SAW sec to decreased effective renal perfusion      as evidenced by the FeNa <1%-due to the diarrhea with the    decreased po intake as well as the ARB+HCTZ    UA (-) except for 25mg/dl protein (-) blood    Cr stable 1.1mg/dl    Continue to monitor         2-Complex L Renal Cyst-MRI reveals bilat simple cysts    No new W/U       3-Diarrhea-Stool Culture with Gram (-) salma       1-Tgbdibfpdbikcy-Pv++ up to 1.7    Avoid Oral Mg++ supplements in the setting of the diarrhea       cont norvasc, vit c, lipitor, plavix, cozaar    cont apresoline, culturelle, mvi    cont sq heparin 5,000 units tid    cont oxy er 10mg bid    cont oral vancomycin 250mg 4x/day    cont po flagyl 500mg tid    cont iv rocephin 1gm qd       d/c plan: home when medically stable

## 2020-08-11 NOTE — PROGRESS NOTES
solution 1 drop, BID  labetalol (NORMODYNE;TRANDATE) injection 10 mg, Q4H PRN  hydrALAZINE (APRESOLINE) tablet 50 mg, 3 times per day  amLODIPine (NORVASC) tablet 10 mg, Daily  hydrALAZINE (APRESOLINE) injection 10 mg, Q6H PRN  losartan (COZAAR) tablet 50 mg, Daily  cholestyramine (QUESTRAN) packet 4 g, BID PRN  cefTRIAXone (ROCEPHIN) 1 g in sterile water 10 mL IV syringe, Q24H  vancomycin (VANCOCIN) oral solution 250 mg, 4 times per day  metroNIDAZOLE (FLAGYL) tablet 500 mg, 3 times per day  ALPRAZolam (XANAX) tablet 1 mg, TID PRN  ascorbic acid (VITAMIN C) tablet 500 mg, Daily  clopidogrel (PLAVIX) tablet 75 mg, Daily  latanoprost (XALATAN) 0.005 % ophthalmic solution 1 drop, Nightly  therapeutic multivitamin-minerals 1 tablet, Daily  vitamin D (CHOLECALCIFEROL) tablet 1,000 Units, Every Other Day  oxyCODONE (OXYCONTIN) extended release tablet 10 mg, 2 times per day  sodium chloride flush 0.9 % injection 10 mL, 2 times per day  sodium chloride flush 0.9 % injection 10 mL, PRN  acetaminophen (TYLENOL) tablet 650 mg, Q6H PRN    Or  acetaminophen (TYLENOL) suppository 650 mg, Q6H PRN  polyethylene glycol (GLYCOLAX) packet 17 g, Daily PRN  promethazine (PHENERGAN) tablet 12.5 mg, Q6H PRN    Or  ondansetron (ZOFRAN) injection 4 mg, Q6H PRN  heparin (porcine) injection 5,000 Units, 3 times per day  atorvastatin (LIPITOR) tablet 10 mg, Nightly  lactobacillus (CULTURELLE) capsule 1 capsule, Daily  brimonidine (ALPHAGAN) 0.2 % ophthalmic solution 1 drop, BID    And  timolol (TIMOPTIC) 0.5 % ophthalmic solution 1 drop, BID        Review of Systems:   Pertinent items are noted in HPI. Remainder of a complete review of systems is (-) other than stated in the HPI    Physical exam:   Constitutional:  Elderly female in NAD  Vitals:   VITALS:  BP (!) 143/70   Pulse 76   Temp 97.7 °F (36.5 °C) (Oral)   Resp 16   Ht 5' 5\" (1.651 m)   Wt 157 lb 9.6 oz (71.5 kg)   SpO2 98%   BMI 26.23 kg/m²   24HR INTAKE/OUTPUT: Intake/Output Summary (Last 24 hours) at 8/11/2020 1254  Last data filed at 8/11/2020 9483  Gross per 24 hour   Intake 100 ml   Output --   Net 100 ml     URINARY CATHETER OUTPUT ([place]):     DRAIN/TUBE OUTPUT:     VENT SETTINGS:  Vent Information  SpO2: 98 %  Additional Respiratory  Assessments  Pulse: 76  Resp: 16  SpO2: 98 %  Oral Care: Teeth brushed    Skin: no rash, turgor wnl  Heent:  eomi, mmm  Neck: no bruits or jvd noted  Cardiovascular: PMI not lat displaced   S1, S2 without S3 or a rub  Respiratory: CTA B without w/r/r  Abdomen:  +bs, soft, nt, nd  Ext: (-) bilat  lower extremity edema  Psychiatric: mood and affect appropriate, cr nr 2-12 grossly intact  Musculoskeletal:  Rom, muscular strength intact    Data:   Labs:  CBC:   Lab Results   Component Value Date    WBC 6.6 08/11/2020    RBC 3.76 08/11/2020    HGB 10.1 08/11/2020    HCT 30.8 08/11/2020    MCV 81.9 08/11/2020    MCH 26.9 08/11/2020    MCHC 32.8 08/11/2020    RDW 15.2 08/11/2020     08/11/2020    MPV 10.3 08/11/2020     CBC with Differential:    Lab Results   Component Value Date    WBC 6.6 08/11/2020    RBC 3.76 08/11/2020    HGB 10.1 08/11/2020    HCT 30.8 08/11/2020     08/11/2020    MCV 81.9 08/11/2020    MCH 26.9 08/11/2020    MCHC 32.8 08/11/2020    RDW 15.2 08/11/2020    NRBC 0.0 08/04/2020    SEGSPCT 38 02/20/2014    LYMPHOPCT 34.6 08/10/2020    MONOPCT 8.7 08/10/2020    EOSPCT 2 10/08/2010    BASOPCT 0.4 08/10/2020    MONOSABS 0.68 08/10/2020    LYMPHSABS 2.72 08/10/2020    EOSABS 0.14 08/10/2020    BASOSABS 0.03 08/10/2020     Hemoglobin/Hematocrit:    Lab Results   Component Value Date    HGB 10.1 08/11/2020    HCT 30.8 08/11/2020     CMP:    Lab Results   Component Value Date     08/11/2020    K 3.9 08/11/2020    K 2.9 08/03/2020     08/11/2020    CO2 24 08/11/2020    BUN 7 08/11/2020    CREATININE 1.1 08/11/2020    GFRAA 59 08/11/2020    LABGLOM 59 08/11/2020    GLUCOSE 122 08/11/2020    GLUCOSE 120 02/20/2012    PROT 5.7 08/11/2020    LABALBU 3.1 08/11/2020    LABALBU 3.8 02/20/2012    CALCIUM 8.3 08/11/2020    BILITOT 0.3 08/11/2020    ALKPHOS 74 08/11/2020    AST 42 08/11/2020    ALT 44 08/11/2020     BMP:    Lab Results   Component Value Date     08/11/2020    K 3.9 08/11/2020    K 2.9 08/03/2020     08/11/2020    CO2 24 08/11/2020    BUN 7 08/11/2020    LABALBU 3.1 08/11/2020    LABALBU 3.8 02/20/2012    CREATININE 1.1 08/11/2020    CALCIUM 8.3 08/11/2020    GFRAA 59 08/11/2020    LABGLOM 59 08/11/2020    GLUCOSE 122 08/11/2020    GLUCOSE 120 02/20/2012     BUN/Creatinine:    Lab Results   Component Value Date    BUN 7 08/11/2020    CREATININE 1.1 08/11/2020     Hepatic Function Panel:    Lab Results   Component Value Date    ALKPHOS 74 08/11/2020    ALT 44 08/11/2020    AST 42 08/11/2020    PROT 5.7 08/11/2020    BILITOT 0.3 08/11/2020    BILIDIR 0.2 01/15/2013    LABALBU 3.1 08/11/2020    LABALBU 3.8 02/20/2012     Albumin:    Lab Results   Component Value Date    LABALBU 3.1 08/11/2020    LABALBU 3.8 02/20/2012     Calcium:    Lab Results   Component Value Date    CALCIUM 8.3 08/11/2020     Ionized Calcium:  No results found for: IONCA  Magnesium:    Lab Results   Component Value Date    MG 1.7 08/11/2020     Phosphorus:    Lab Results   Component Value Date    PHOS 3.4 08/11/2020     LDH:  No results found for: LDH  Uric Acid:  No results found for: LABURIC, URICACID  PT/INR:    Lab Results   Component Value Date    PROTIME 11.7 08/04/2020    INR 1.0 08/04/2020     PTT:    Lab Results   Component Value Date    APTT 26.5 08/04/2020   [APTT}  Troponin:    Lab Results   Component Value Date    TROPONINI 0.02 01/15/2013    TROPONINI <0.01 02/15/2012     U/A:    Lab Results   Component Value Date    COLORU Yellow 08/03/2020    PROTEINU TRACE 08/03/2020    PHUR 5.0 08/03/2020    LABCAST FEW  01/15/2013    WBCUA NONE 08/03/2020    RBCUA NONE 08/03/2020    RBCUA NONE 01/15/2013    BACTERIA NONE SEEN 2020    CLARITYU Clear 2020    SPECGRAV 1.020 2020    LEUKOCYTESUR Negative 2020    UROBILINOGEN 0.2 2020    BILIRUBINUR Negative 2020    BLOODU Negative 2020    GLUCOSEU Negative 2020    AMORPHOUS FEW 01/15/2013     ABG:  No results found for: PH, PCO2, PO2, HCO3, BE, THGB, TCO2, O2SAT  HgBA1c:    Lab Results   Component Value Date    LABA1C 6.4 2020     Microalbumen/Creatinine ratio:  No components found for: RUCREAT  FLP:    Lab Results   Component Value Date    TRIG 314 2020    HDL 16 2020    LDLCALC 114 2020    LABVLDL 63 2020     TSH:    Lab Results   Component Value Date    TSH 4.420 2014     VITAMIN B12: No components found for: B12  FOLATE:    Lab Results   Component Value Date    FOLATE 12.0 2020     Iron Saturation:  No components found for: PERCENTFE  FERRITIN:    Lab Results   Component Value Date    FERRITIN 627 2020     AMYLASE:    Lab Results   Component Value Date    AMYLASE 96 10/28/2014     LIPASE:    Lab Results   Component Value Date    LIPASE 44 2020     Fibrinogen Level:  No components found for: FIB  24 Hour Urine for Protein:  No components found for: RAWUPRO, UHRS3, NCPC24FG, UTV3  24 Hour Urine for Creatinine Clearance:  No components found for: Bill Drummond, UTV10     Imaging:  Patient MRN: [id number]    : 1945    Age:  74 years    Gender: Female    Order Date: 2020 6:35 AM    Exam: XR CHEST PORTABLE    Number of Images: 1 view    Indication:   Cough    Cough    Comparison: 2018         FINDINGS:         Heart and pulmonary vascularity normal. Lungs clear. Costophrenic    angles sharp. Normal aorta.                   Impression    No acute cardiopulmonary findings.       Patient MRN:  [id number]    : 1945    Age: 76 years    Gender: Female    Order Date:  8/3/2020 4:36 PM    EXAM: CT ABDOMEN PELVIS WO CONTRAST    INDICATION:  acute kidney injury acute kidney injury      COMPARISON: CT the abdomen and pelvis, 5/12/2010         Technique: Low-dose CT  acquisition technique included one of    following options; 1 . Automated exposure control, 2. Adjustment of MA    and or KV according to patient's size or 3. Use of iterative    reconstruction.         Multiple computerized tomography sections of the abdomen and pelvis    with sagittal and coronal MPR reconstructions were obtained from the    top of the diaphragm to the pelvis.                FINDINGS:         LOWER THORAX: Unremarkable. LIVER: Unremarkable. GALLBLADDER:Unremarkable    SPLEEN:Unremarkable. ADRENALS:Unremarkable. KIDNEYS: The kidneys are normal in size. No stone or hydronephrosis is    seen. Multiple small lesions are seen in the kidneys, which may    represent cysts. A 1.5 cm exophytic hyperdense lesion along the upper    pole of the left kidney may represent a solid lesion. Evaluation with sonography is recommended. PANCREAS: 1.6 cm hypodense lesion is seen in the region of the    uncinate process. It was not visualized on the prior CT from 5/12/2010    BOWEL: No bowel wall thickening or obstruction. APPENDIX:Unremarkable. BLADDER:Unremarkable. REPRODUCTIVE ORGANS: Status post hysterectomy. VASCULATURE:Mild calcified atherosclerosis seen in the aorta. No    aneurysm. LYMPH NODES:Unremarkable. BONES:Evaluation of the bones reveals no fracture or joint    dislocation. Prominent sclerosis along the left femoral head likely    due to avascular necrosis. Severe loss of disc height at L5-S1. Jerrica Boop MISCELLANEOUS:No additional finding.              Impression              1. No acute abnormality seen in the abdomen or the pelvis. 2. Bilateral renal lesions, including a 1.6 cm hyperdense lesion on    the left.      3. 1.6 and a hypodense lesion in the uncinate process of the pancreas.     4.. Postcontrast enhanced MRI of the abdomen is recommended to    evaluate both the renal and pancreatic lesions. US RETROPERITONEAL COMPLETE [[id number]]      Resulted: 20     Updated: 20     Narrative:      Patient Mrn: [id number]   : 1945   Age:  68 years   Gender: Female   Order Date: 2020 4:21 PM     Exam: US RETROPERITONEAL COMPLETE     Number Of Images: 46 Views     Indication:  Bilateral renal probable cysts reported by CT Abdomen and   Pelvis noncontrast imaging August 3, 2020     Comparison: Noncontrast CT abdomen and pelvis study August 3, 2020     TECHNIQUE: 2-D grayscale and color Doppler imaging were utilized for   evaluation of the kidneys and bladder. Findings: The right kidney ayemepje77.5 x 4.3 x 3.7 cm, and the left   kidney measures 9.3 x 4.5 x 5.0 cm. There is no evidence of collecting system calcification, obstructive   dilation, or perinephric inflammatory change. Cortical echogenicity is   increased bilaterally, with some cortical thinning on the right,   suggesting the spectrum of medical renal disease. Cysts in the upper   pole and mid right kidney measured 2.2 and 1.2 cm diameter,   respectively. . An upper pole cyst of the left kidney measures 1.8 cm   maximum diameter. Central perfusion is intact in both kidneys by color   Doppler imaging. Images of the bladder show an estimated volume of 314 mL. The post   void residual was not measured. Bladder mural contours appear normal,   and no intraluminal abnormalities are evident. Impression:      Both kidneys show some increased cortical echogenicity suggesting the   spectrum of medical renal disease. Multifocal bilateral cysts, with   smaller bilateral cysts not enumerated above, right common finding in   patients with medical renal disease.  Retrospective review of the CT   study from August 3, 2020 suggests that the posterior mid left renal   echogenic finding is likely a small hemorrhagic cyst. On this   examination, several of the anechoic cystic structures do not show   good through transmission/shadowing, which may be artifact related to   habitus and depth of the cysts from the skin surface. If further   evaluation is felt to be clinically necessary on the basis of   hematuria or other clinical concerns, MRI can identify and   differentiate solid from cystic structures. There is no evidence of obstructive dilation, collecting system   calcification, or perinephric inflammatory change on either side. Perfusion appears intact bilaterally. The bladder is normal in   appearance. Assessment  1-Stage III SAW sec to decreased effective renal perfusion  as evidenced by the FeNa <1%-due to the diarrhea with the decreased po intake as well as the ARB+HCTZ  UA (-) except for 25mg/dl protein (-) blood  Cr stable 1.1mg/dl  PLAN:1. Continue to monitor     2-Complex L Renal Cyst-MRI reveals bilat simple cysts  PLAN:1. No new W/U    3-Anemia in CKD  Ferritin 627, Iron Sat 43%  Folate 12, Vit B12 711  Stools for Occult Blood (+)  SPEP and UPEP (-) for monoclonal protein  PLAN:1. Follow HgB    4- Sec HPTH of Renal Origin  PO4 5.0  , Vit D 56  PLAN:1. Follow Ca++ and PO4    5- HTN with CKD I-IV  BP at goal <130/80  PLAN:1. Follow    6- CKD G3 B baseline serum cr 1.4-1.5mg/dl with an e-GFR=41-44ml/min presumed sec to microvascular disease    7- Diarrhea-Stool Culture with Gram (-) salma-GI note reviewed    8-Hypophosphatemia  Resolved PO4 5.0-->3.4  PLAN:1. Follow    9- Hypomagnesemia-Mg++ up to 1.7  PLAN:1. Follow as an outpt  2.  Avoid Oral Mg++ supplements in the setting of the diarrhea    Thank you  for allowing us to participate in care of Genna Bacon  12:54 PM  8/11/2020

## 2020-08-11 NOTE — PROGRESS NOTES
Pt has had episodes of diarrhea x2 this morning. Administered questran packet, will continue to monitor.

## 2020-08-11 NOTE — PLAN OF CARE
Problem: Falls - Risk of:  Goal: Will remain free from falls  Description: Will remain free from falls  8/11/2020 0222 by Alban Addison RN  Outcome: Met This Shift

## 2020-08-14 NOTE — DISCHARGE SUMMARY
Physician Discharge Summary     Patient ID:  Lissett Mane  [id number]  76 y.o.  1945    Admit date: 8/4/2020    Discharge date and time:  8/11/2020    Admission Diagnoses:   Patient Active Problem List   Diagnosis    Essential hypertension    Acute kidney injury ([facility] [address]    Pancreatic lesion    Avascular necrosis of femoral head, left (HCC)    Diarrhea    Enteritis    Dehydration    CKD (chronic kidney disease) stage 3, GFR 30-59 ml/min ([facility] [address]       Discharge Diagnoses: as above    Consults: GI, nephrology, general surgery, urology and ophthalmology    Procedures: see chart    Hospital Course: patient was admitted with diarrhea. She was diagnosed with enteritis. She was seen by GI, urology. She had left hip pain. She was seen by ortho. She improved with IV fluids. Her diet was slowly advanced. She developed eye pain. Ophthalmology evaluated. She was seen by Pt/Ot and discharged home in stable condition.       Discharge Exam:  See progress note from today    Condition:  stable    Disposition: home    Patient Instructions:   Discharge Medication List as of 8/11/2020  5:12 PM      START taking these medications    Details   cyclopentolate (CYCLOGYL) 1 % ophthalmic solution Place 1 drop into the left eye 2 times daily for 10 days, Disp-1 mL,R-0Normal      prednisoLONE acetate (PRED FORTE) 1 % ophthalmic suspension Place 1 drop into the left eye 3 times daily, Disp-1 Bottle,R-0Normal      lactobacillus (CULTURELLE) CAPS capsule Take 1 capsule by mouth daily, Disp-14 capsule,R-0Normal      cholestyramine (QUESTRAN) 4 g packet Take 1 packet by mouth 2 times daily, Disp-28 packet,R-0Normal      hydrALAZINE (APRESOLINE) 50 MG tablet Take 1 tablet by mouth every 8 hours, Disp-90 tablet,R-0Normal      losartan (COZAAR) 50 MG tablet Take 1 tablet by mouth daily, Disp-30 tablet,R-0Normal      amLODIPine (NORVASC) 10 MG tablet Take 1 tablet by mouth daily, Disp-30 tablet,R-0Normal      vancomycin (VANCOCIN) 50 mg/mL oral solution Take 5 mLs by mouth every 8 hours for 10 days, Disp-150 mL,R-0Normal         CONTINUE these medications which have NOT CHANGED    Details   latanoprost (XALATAN) 0.005 % ophthalmic solution Place 1 drop into both eyes nightlyHistorical Med      Multiple Vitamins-Minerals (CENTRUM ADULTS PO) Take by mouthHistorical Med      Ascorbic Acid (VITAMIN C) 500 MG tablet Take 500 mg by mouth dailyHistorical Med      brimonidine-timolol (COMBIGAN) 0.2-0.5 % ophthalmic solution Place 1 drop into both eyes every 12 hours. Historical Med      clopidogrel (PLAVIX) 75 MG tablet Take 75 mg by mouth daily. Historical Med      morphine (MS CONTIN) 30 MG CR tablet Take 12 mg by mouth 2 times daily. Historical Med      lovastatin (MEVACOR) 40 MG tablet Take 40 mg by mouth nightly. Historical Med      alprazolam (XANAX) 1 MG tablet Take 1 mg by mouth 3 times daily as needed. Historical Med      vitamin D (CHOLECALCIFEROL) 1000 units TABS tablet Take 1,000 Units by mouth every other day Last taken Monday 8/3Historical Med         STOP taking these medications       losartan-hydrochlorothiazide (HYZAAR) 100-12.5 MG per tablet Comments:   Reason for Stopping:             Activity: activity as tolerated  Diet: low fat, low cholesterol diet    Follow up with dr Swati Hale in 1 week. Follow up with dr Jojo alvarado in 2-3 weeks. Follow up with dr Oneyda howard in 2-3 weeks. Follow up with dr Jacey ball in 2-3 weeks. Follow up with dr Rozanne Holstein in 2-3 weeks. Follow up with dr Julio Torres in 1-2 weeks.      Note that over 30 minutes was spent in preparing discharge papers, discussing discharge with patient, medication review, etc.    Signed:  Jason Challenger    8/14/2020  12:19 PM

## 2020-09-01 ENCOUNTER — HOSPITAL ENCOUNTER (OUTPATIENT)
Age: 75
Discharge: HOME | End: 2020-09-01
Payer: MEDICARE

## 2020-09-01 LAB
ALBUMIN SERPL-MCNC: 4.2 G/DL (ref 3.5–5.2)
ALP BLD-CCNC: 80 U/L (ref 35–104)
ALT SERPL-CCNC: 8 U/L (ref 0–32)
ANION GAP SERPL CALCULATED.3IONS-SCNC: 11 MMOL/L (ref 7–16)
AST SERPL-CCNC: 14 U/L (ref 0–31)
BASOPHILS ABSOLUTE: 0.04 E9/L (ref 0–0.2)
BASOPHILS RELATIVE PERCENT: 0.7 % (ref 0–2)
BILIRUB SERPL-MCNC: 0.5 MG/DL (ref 0–1.2)
BUN BLDV-MCNC: 14 MG/DL (ref 8–23)
CALCIUM SERPL-MCNC: 9.3 MG/DL (ref 8.6–10.2)
CHLORIDE BLD-SCNC: 105 MMOL/L (ref 98–107)
CO2: 24 MMOL/L (ref 22–29)
CREAT SERPL-MCNC: 1.2 MG/DL (ref 0.5–1)
EOSINOPHILS ABSOLUTE: 0.12 E9/L (ref 0.05–0.5)
EOSINOPHILS RELATIVE PERCENT: 2.1 % (ref 0–6)
FERRITIN: 350 NG/ML
FOLATE: 8.3 NG/ML (ref 4.8–24.2)
GFR AFRICAN AMERICAN: 53
GFR NON-AFRICAN AMERICAN: 53 ML/MIN/1.73
GLUCOSE BLD-MCNC: 119 MG/DL (ref 74–99)
HCT VFR BLD CALC: 31.4 % (ref 34–48)
HEMOGLOBIN: 10.4 G/DL (ref 11.5–15.5)
IMMATURE GRANULOCYTES #: 0.02 E9/L
IMMATURE GRANULOCYTES %: 0.4 % (ref 0–5)
IMMATURE RETIC FRACT: 12.7 % (ref 3–15.9)
IRON SATURATION: 29 % (ref 15–50)
IRON: 55 MCG/DL (ref 37–145)
LYMPHOCYTES ABSOLUTE: 1.7 E9/L (ref 1.5–4)
LYMPHOCYTES RELATIVE PERCENT: 29.9 % (ref 20–42)
MCH RBC QN AUTO: 28 PG (ref 26–35)
MCHC RBC AUTO-ENTMCNC: 33.1 % (ref 32–34.5)
MCV RBC AUTO: 84.4 FL (ref 80–99.9)
MONOCYTES ABSOLUTE: 0.44 E9/L (ref 0.1–0.95)
MONOCYTES RELATIVE PERCENT: 7.7 % (ref 2–12)
NEUTROPHILS ABSOLUTE: 3.36 E9/L (ref 1.8–7.3)
NEUTROPHILS RELATIVE PERCENT: 59.2 % (ref 43–80)
PDW BLD-RTO: 15 FL (ref 11.5–15)
PLATELET # BLD: 189 E9/L (ref 130–450)
PMV BLD AUTO: 9 FL (ref 7–12)
POTASSIUM SERPL-SCNC: 3.3 MMOL/L (ref 3.5–5)
RBC # BLD: 3.72 E12/L (ref 3.5–5.5)
RETIC HGB EQUIVALENT: 30.9 PG (ref 28.2–36.6)
RETICULOCYTE ABSOLUTE COUNT: 0.06 E12/L
RETICULOCYTE COUNT PCT: 1.7 % (ref 0.4–1.9)
SODIUM BLD-SCNC: 140 MMOL/L (ref 132–146)
TOTAL IRON BINDING CAPACITY: 187 MCG/DL (ref 250–450)
TOTAL PROTEIN: 7.2 G/DL (ref 6.4–8.3)
VITAMIN B-12: 384 PG/ML (ref 211–946)
WBC # BLD: 5.7 E9/L (ref 4.5–11.5)

## 2020-09-01 PROCEDURE — 36415 COLL VENOUS BLD VENIPUNCTURE: CPT

## 2020-09-01 PROCEDURE — 82728 ASSAY OF FERRITIN: CPT

## 2020-09-01 PROCEDURE — 85025 COMPLETE CBC W/AUTO DIFF WBC: CPT

## 2020-09-01 PROCEDURE — 83550 IRON BINDING TEST: CPT

## 2020-09-01 PROCEDURE — 80053 COMPREHEN METABOLIC PANEL: CPT

## 2020-09-01 PROCEDURE — 82746 ASSAY OF FOLIC ACID SERUM: CPT

## 2020-09-01 PROCEDURE — 85045 AUTOMATED RETICULOCYTE COUNT: CPT

## 2020-09-01 PROCEDURE — 83540 ASSAY OF IRON: CPT

## 2020-09-01 PROCEDURE — 82607 VITAMIN B-12: CPT

## 2020-09-01 PROCEDURE — 82525 ASSAY OF COPPER: CPT

## 2020-09-03 PROBLEM — E86.0 DEHYDRATION: Status: RESOLVED | Noted: 2020-08-04 | Resolved: 2020-09-03

## 2020-09-05 LAB — COPPER: 99 UG/DL (ref 80–155)

## 2020-10-13 ENCOUNTER — HOSPITAL ENCOUNTER (OUTPATIENT)
Age: 75
Discharge: HOME | End: 2020-10-13
Payer: MEDICARE

## 2020-10-13 LAB
ALBUMIN SERPL-MCNC: 4.3 G/DL (ref 3.5–5.2)
ALP BLD-CCNC: 89 U/L (ref 35–104)
ALT SERPL-CCNC: 8 U/L (ref 0–32)
ANION GAP SERPL CALCULATED.3IONS-SCNC: 12 MMOL/L (ref 7–16)
AST SERPL-CCNC: 13 U/L (ref 0–31)
BILIRUB SERPL-MCNC: 0.8 MG/DL (ref 0–1.2)
BUN BLDV-MCNC: 15 MG/DL (ref 8–23)
CALCIUM SERPL-MCNC: 9.8 MG/DL (ref 8.6–10.2)
CHLORIDE BLD-SCNC: 104 MMOL/L (ref 98–107)
CHOLESTEROL, TOTAL: 169 MG/DL (ref 0–199)
CO2: 28 MMOL/L (ref 22–29)
CREAT SERPL-MCNC: 1.3 MG/DL (ref 0.5–1)
GFR AFRICAN AMERICAN: 48
GFR NON-AFRICAN AMERICAN: 48 ML/MIN/1.73
GLUCOSE BLD-MCNC: 130 MG/DL (ref 74–99)
HBA1C MFR BLD: 5.5 % (ref 4–5.6)
HCT VFR BLD CALC: 36.2 % (ref 34–48)
HDLC SERPL-MCNC: 42 MG/DL
HEMOGLOBIN: 12 G/DL (ref 11.5–15.5)
LDL CHOLESTEROL CALCULATED: 102 MG/DL (ref 0–99)
MCH RBC QN AUTO: 27.5 PG (ref 26–35)
MCHC RBC AUTO-ENTMCNC: 33.1 % (ref 32–34.5)
MCV RBC AUTO: 83 FL (ref 80–99.9)
PDW BLD-RTO: 13.7 FL (ref 11.5–15)
PLATELET # BLD: 166 E9/L (ref 130–450)
PMV BLD AUTO: 10 FL (ref 7–12)
POTASSIUM SERPL-SCNC: 3.6 MMOL/L (ref 3.5–5)
RBC # BLD: 4.36 E12/L (ref 3.5–5.5)
SODIUM BLD-SCNC: 144 MMOL/L (ref 132–146)
TOTAL PROTEIN: 7.3 G/DL (ref 6.4–8.3)
TRIGL SERPL-MCNC: 125 MG/DL (ref 0–149)
TSH SERPL DL<=0.05 MIU/L-ACNC: 1.76 UIU/ML (ref 0.27–4.2)
VLDLC SERPL CALC-MCNC: 25 MG/DL
WBC # BLD: 5 E9/L (ref 4.5–11.5)

## 2020-10-13 PROCEDURE — 83036 HEMOGLOBIN GLYCOSYLATED A1C: CPT

## 2020-10-13 PROCEDURE — 80061 LIPID PANEL: CPT

## 2020-10-13 PROCEDURE — 36415 COLL VENOUS BLD VENIPUNCTURE: CPT

## 2020-10-13 PROCEDURE — 84443 ASSAY THYROID STIM HORMONE: CPT

## 2020-10-13 PROCEDURE — 85027 COMPLETE CBC AUTOMATED: CPT

## 2020-10-13 PROCEDURE — 80053 COMPREHEN METABOLIC PANEL: CPT

## 2021-01-15 ENCOUNTER — HOSPITAL ENCOUNTER (OUTPATIENT)
Age: 76
Discharge: HOME | End: 2021-01-15
Payer: MEDICARE

## 2021-01-15 LAB
ALBUMIN SERPL-MCNC: 4.1 G/DL (ref 3.5–5.2)
ALP BLD-CCNC: 85 U/L (ref 35–104)
ALT SERPL-CCNC: 8 U/L (ref 0–32)
ANION GAP SERPL CALCULATED.3IONS-SCNC: 12 MMOL/L (ref 7–16)
AST SERPL-CCNC: 12 U/L (ref 0–31)
BASOPHILS ABSOLUTE: 0.02 E9/L (ref 0–0.2)
BASOPHILS RELATIVE PERCENT: 0.4 % (ref 0–2)
BILIRUB SERPL-MCNC: 0.6 MG/DL (ref 0–1.2)
BUN BLDV-MCNC: 19 MG/DL (ref 8–23)
CALCIUM SERPL-MCNC: 9.6 MG/DL (ref 8.6–10.2)
CHLORIDE BLD-SCNC: 99 MMOL/L (ref 98–107)
CHOLESTEROL, FASTING: 172 MG/DL (ref 0–199)
CO2: 29 MMOL/L (ref 22–29)
CREAT SERPL-MCNC: 1.2 MG/DL (ref 0.5–1)
EOSINOPHILS ABSOLUTE: 0.12 E9/L (ref 0.05–0.5)
EOSINOPHILS RELATIVE PERCENT: 2.3 % (ref 0–6)
GFR AFRICAN AMERICAN: 53
GFR NON-AFRICAN AMERICAN: 53 ML/MIN/1.73
GLUCOSE BLD-MCNC: 102 MG/DL (ref 74–99)
HCT VFR BLD CALC: 37.4 % (ref 34–48)
HDLC SERPL-MCNC: 41 MG/DL
HEMOGLOBIN: 12.2 G/DL (ref 11.5–15.5)
IMMATURE GRANULOCYTES #: 0.01 E9/L
IMMATURE GRANULOCYTES %: 0.2 % (ref 0–5)
LDL CHOLESTEROL CALCULATED: 107 MG/DL (ref 0–99)
LYMPHOCYTES ABSOLUTE: 2.84 E9/L (ref 1.5–4)
LYMPHOCYTES RELATIVE PERCENT: 53.6 % (ref 20–42)
MAGNESIUM: 2 MG/DL (ref 1.6–2.6)
MCH RBC QN AUTO: 26.2 PG (ref 26–35)
MCHC RBC AUTO-ENTMCNC: 32.6 % (ref 32–34.5)
MCV RBC AUTO: 80.3 FL (ref 80–99.9)
MONOCYTES ABSOLUTE: 0.37 E9/L (ref 0.1–0.95)
MONOCYTES RELATIVE PERCENT: 7 % (ref 2–12)
NEUTROPHILS ABSOLUTE: 1.94 E9/L (ref 1.8–7.3)
NEUTROPHILS RELATIVE PERCENT: 36.5 % (ref 43–80)
PARATHYROID HORMONE INTACT: 52 PG/ML (ref 15–65)
PDW BLD-RTO: 13.7 FL (ref 11.5–15)
PHOSPHORUS: 4.2 MG/DL (ref 2.5–4.5)
PLATELET # BLD: 162 E9/L (ref 130–450)
PMV BLD AUTO: 9.9 FL (ref 7–12)
POTASSIUM SERPL-SCNC: 3.7 MMOL/L (ref 3.5–5)
RBC # BLD: 4.66 E12/L (ref 3.5–5.5)
SODIUM BLD-SCNC: 140 MMOL/L (ref 132–146)
TOTAL PROTEIN: 6.9 G/DL (ref 6.4–8.3)
TRIGLYCERIDE, FASTING: 118 MG/DL (ref 0–149)
URIC ACID, SERUM: 5.7 MG/DL (ref 2.4–5.7)
VITAMIN D 25-HYDROXY: 55 NG/ML (ref 30–100)
VLDLC SERPL CALC-MCNC: 24 MG/DL
WBC # BLD: 5.3 E9/L (ref 4.5–11.5)

## 2021-01-15 PROCEDURE — 84100 ASSAY OF PHOSPHORUS: CPT

## 2021-01-15 PROCEDURE — 82306 VITAMIN D 25 HYDROXY: CPT

## 2021-01-15 PROCEDURE — 36415 COLL VENOUS BLD VENIPUNCTURE: CPT

## 2021-01-15 PROCEDURE — 83970 ASSAY OF PARATHORMONE: CPT

## 2021-01-15 PROCEDURE — 85025 COMPLETE CBC W/AUTO DIFF WBC: CPT

## 2021-01-15 PROCEDURE — 80061 LIPID PANEL: CPT

## 2021-01-15 PROCEDURE — 84550 ASSAY OF BLOOD/URIC ACID: CPT

## 2021-01-15 PROCEDURE — 83735 ASSAY OF MAGNESIUM: CPT

## 2021-01-15 PROCEDURE — 80053 COMPREHEN METABOLIC PANEL: CPT

## 2021-02-25 ENCOUNTER — HOSPITAL ENCOUNTER (OUTPATIENT)
Age: 76
Discharge: HOME | End: 2021-02-25
Payer: MEDICARE

## 2021-02-25 LAB
ALBUMIN SERPL-MCNC: 4.1 G/DL (ref 3.5–5.2)
ALP BLD-CCNC: 92 U/L (ref 35–104)
ALT SERPL-CCNC: 9 U/L (ref 0–32)
ANION GAP SERPL CALCULATED.3IONS-SCNC: 6 MMOL/L (ref 7–16)
ANION GAP SERPL CALCULATED.3IONS-SCNC: 7 MMOL/L (ref 7–16)
AST SERPL-CCNC: 12 U/L (ref 0–31)
BILIRUB SERPL-MCNC: 0.6 MG/DL (ref 0–1.2)
BUN BLDV-MCNC: 15 MG/DL (ref 8–23)
CALCIUM SERPL-MCNC: 9 MG/DL (ref 8.6–10.2)
CHLORIDE BLD-SCNC: 100 MMOL/L (ref 98–107)
CHLORIDE BLD-SCNC: 99 MMOL/L (ref 98–107)
CO2: 32 MMOL/L (ref 22–29)
CO2: 32 MMOL/L (ref 22–29)
CREAT SERPL-MCNC: 1.2 MG/DL (ref 0.5–1)
EKG ATRIAL RATE: 59 BPM
EKG P AXIS: 62 DEGREES
EKG P-R INTERVAL: 180 MS
EKG Q-T INTERVAL: 400 MS
EKG QRS DURATION: 86 MS
EKG QTC CALCULATION (BAZETT): 396 MS
EKG R AXIS: 35 DEGREES
EKG T AXIS: 144 DEGREES
EKG VENTRICULAR RATE: 59 BPM
GFR AFRICAN AMERICAN: 53
GFR NON-AFRICAN AMERICAN: 53 ML/MIN/1.73
GLUCOSE BLD-MCNC: 112 MG/DL (ref 74–99)
HCT VFR BLD CALC: 35.9 % (ref 34–48)
HEMOGLOBIN: 11.5 G/DL (ref 11.5–15.5)
POTASSIUM SERPL-SCNC: 3.3 MMOL/L (ref 3.5–5)
POTASSIUM SERPL-SCNC: 3.3 MMOL/L (ref 3.5–5)
SODIUM BLD-SCNC: 137 MMOL/L (ref 132–146)
SODIUM BLD-SCNC: 139 MMOL/L (ref 132–146)
TOTAL PROTEIN: 6.6 G/DL (ref 6.4–8.3)

## 2021-02-25 PROCEDURE — 80053 COMPREHEN METABOLIC PANEL: CPT

## 2021-02-25 PROCEDURE — 93005 ELECTROCARDIOGRAM TRACING: CPT | Performed by: INTERNAL MEDICINE

## 2021-02-25 PROCEDURE — 80051 ELECTROLYTE PANEL: CPT

## 2021-02-25 PROCEDURE — 93010 ELECTROCARDIOGRAM REPORT: CPT | Performed by: INTERNAL MEDICINE

## 2021-02-25 PROCEDURE — 85018 HEMOGLOBIN: CPT

## 2021-02-25 PROCEDURE — 85014 HEMATOCRIT: CPT

## 2021-02-25 PROCEDURE — 36415 COLL VENOUS BLD VENIPUNCTURE: CPT

## 2021-03-05 ENCOUNTER — OFFICE VISIT (OUTPATIENT)
Dept: CARDIOLOGY CLINIC | Age: 76
End: 2021-03-05
Payer: MEDICARE

## 2021-03-05 VITALS
WEIGHT: 162.2 LBS | HEIGHT: 65 IN | DIASTOLIC BLOOD PRESSURE: 60 MMHG | OXYGEN SATURATION: 98 % | BODY MASS INDEX: 27.02 KG/M2 | RESPIRATION RATE: 16 BRPM | SYSTOLIC BLOOD PRESSURE: 108 MMHG | HEART RATE: 57 BPM

## 2021-03-05 DIAGNOSIS — Z01.818 PRE-OP TESTING: ICD-10-CM

## 2021-03-05 DIAGNOSIS — Z01.818 PRE-OP TESTING: Primary | ICD-10-CM

## 2021-03-05 DIAGNOSIS — I34.0 NONRHEUMATIC MITRAL VALVE REGURGITATION: ICD-10-CM

## 2021-03-05 DIAGNOSIS — Z86.73 HISTORY OF STROKE: Primary | ICD-10-CM

## 2021-03-05 DIAGNOSIS — I10 ESSENTIAL HYPERTENSION: Chronic | ICD-10-CM

## 2021-03-05 DIAGNOSIS — I34.0 MODERATE MITRAL REGURGITATION: ICD-10-CM

## 2021-03-05 DIAGNOSIS — R06.09 DYSPNEA ON EXERTION: ICD-10-CM

## 2021-03-05 DIAGNOSIS — Z86.73 HISTORY OF STROKE: ICD-10-CM

## 2021-03-05 PROCEDURE — 1123F ACP DISCUSS/DSCN MKR DOCD: CPT | Performed by: INTERNAL MEDICINE

## 2021-03-05 PROCEDURE — 3017F COLORECTAL CA SCREEN DOC REV: CPT | Performed by: INTERNAL MEDICINE

## 2021-03-05 PROCEDURE — 93000 ELECTROCARDIOGRAM COMPLETE: CPT | Performed by: INTERNAL MEDICINE

## 2021-03-05 PROCEDURE — G8400 PT W/DXA NO RESULTS DOC: HCPCS | Performed by: INTERNAL MEDICINE

## 2021-03-05 PROCEDURE — 99203 OFFICE O/P NEW LOW 30 MIN: CPT | Performed by: INTERNAL MEDICINE

## 2021-03-05 PROCEDURE — 1090F PRES/ABSN URINE INCON ASSESS: CPT | Performed by: INTERNAL MEDICINE

## 2021-03-05 PROCEDURE — 4040F PNEUMOC VAC/ADMIN/RCVD: CPT | Performed by: INTERNAL MEDICINE

## 2021-03-05 PROCEDURE — G8417 CALC BMI ABV UP PARAM F/U: HCPCS | Performed by: INTERNAL MEDICINE

## 2021-03-05 PROCEDURE — 1036F TOBACCO NON-USER: CPT | Performed by: INTERNAL MEDICINE

## 2021-03-05 PROCEDURE — G8427 DOCREV CUR MEDS BY ELIG CLIN: HCPCS | Performed by: INTERNAL MEDICINE

## 2021-03-05 PROCEDURE — G8484 FLU IMMUNIZE NO ADMIN: HCPCS | Performed by: INTERNAL MEDICINE

## 2021-03-05 RX ORDER — POTASSIUM CHLORIDE 20 MEQ/1
TABLET, EXTENDED RELEASE ORAL
COMMUNITY
Start: 2021-03-02 | End: 2021-03-19

## 2021-03-05 RX ORDER — HYDROCHLOROTHIAZIDE 12.5 MG/1
12.5 CAPSULE, GELATIN COATED ORAL DAILY
COMMUNITY
Start: 2021-02-06

## 2021-03-05 NOTE — PROGRESS NOTES
[address] [facility]   Heart and Vascular [place]   Clinic Note     Date:3/5/21   Patient Name:Amy Carlson  YOB: 1945  Age: 76 y.o. Primary Care Provider: Jennifer Gordon MD    Subjective     55-year-old -American female was referred for preoperative risk stratification for shoulder surgery. She has significant lower back pain and left shoulder pain that prevented her from much physical activity in addition to 1 old knee surgery on the left side that impairs her walking fast.  She does laundry around the house and takes care of her older aunts. She denies history of chest pain or dyspnea with low level physical activity. She has no orthopnea or PND or lower extremity edema or palpitations or syncope or presyncope. She walks with a cane       A focused history review includes:  1. Lacunar strokes (MRI brain )  2. Moderate MR on TTE 2017 with mild AR, moderate MR, mild TR - reviewed personally  3. CKD3a with baseline Cr 1.2  4. HTN  5. Suspicion for small uncinate pancreatic mass - worked up at [facility] Dr. Farideh Hull  6. Left femoral head avascular necrosis  7. Left eye retinal detachment   8.  No significant smoking history; social EtOH; no recreational drugs      Past History    Past Medical History:         Diagnosis Date    Chronic renal disease, stage II 2012    Hypertension     Rhabdomyolysis 1/15/2013       Past Surgical History:  Past Surgical History:   Procedure Laterality Date    BACK SURGERY       SECTION      X2    EYE SURGERY      left eye-4 or 5 surgeries, detatched retina x2, has a buckle in her left eye    HYSTERECTOMY      KNEE SURGERY      left knee    ROTATOR CUFF REPAIR      2017 right shoulder    TUBAL LIGATION         Social History:    Social History     Tobacco History     Smoking Status  Former Smoker Smoking Frequency  For 5 years Smoking Tobacco Type  Cigarettes    Smokeless Tobacco Use  Never Used    Tobacco Comment  quit 30 yrs ago/ couple cigarettes a day          Alcohol History     Alcohol Use Status  Yes Comment  occasional/tea occa. Drug Use     Drug Use Status  No          Sexual Activity     Sexually Active  Not Asked                    Family History:-      Problem Relation Age of Onset    Heart Disease Mother     Cancer Mother          Review of Systems   General ROS: No weight loss fevers or chills  Psychological ROS: No new depression or anxiety or altered mood  Ophthalmic ROS: No new vision changes or diplopia  Respiratory ROS: No cough, wheezing, shortness of breath, or hemoptysis  Cardiovascular ROS: See HPI  Gastrointestinal ROS: No nausea, vomiting, constipation, diarrhea, hematemesis, melena, or hematochezia  Genito-Urinary ROS: No dysuria, hematuria, or new incontinence  Musculoskeletal ROS: No new muscle pain, joint pain, joint swelling, or back pain  Neurological ROS: No new numbness or paresthesias, no focal weakness, no altered speech, no new memory loss  Dermatological ROS: No new rashes, no pruritus, no skin masses        Medications     Current Outpatient Medications   Medication Sig Dispense Refill    hydroCHLOROthiazide (MICROZIDE) 12.5 MG capsule 12.5 mg daily       potassium chloride (KLOR-CON M) 20 MEQ extended release tablet TAKE 1 TABLET BY MOUTH TWICE DAILY      losartan (COZAAR) 50 MG tablet Take 1 tablet by mouth daily 30 tablet 0    latanoprost (XALATAN) 0.005 % ophthalmic solution Place 1 drop into both eyes nightly      Multiple Vitamins-Minerals (CENTRUM ADULTS PO) Take by mouth      vitamin D (CHOLECALCIFEROL) 1000 units TABS tablet Take 1,000 Units by mouth every other day Last taken Monday 8/3      Ascorbic Acid (VITAMIN C) 500 MG tablet Take 500 mg by mouth daily      brimonidine-timolol (COMBIGAN) 0.2-0.5 % ophthalmic solution Place 1 drop into both eyes every 12 hours.  clopidogrel (PLAVIX) 75 MG tablet Take 75 mg by mouth daily.         morphine (MS CONTIN) 30 MG CR tablet Take 12 mg by mouth 2 times daily.  lovastatin (MEVACOR) 40 MG tablet Take 40 mg by mouth nightly.  alprazolam (XANAX) 1 MG tablet Take 1 mg by mouth 3 times daily as needed. No current facility-administered medications for this visit. Physical Examination      /60   Pulse 57   Resp 16   Ht 5' 5\" (1.651 m)   Wt 162 lb 3.2 oz (73.6 kg)   SpO2 98%   BMI 26.99 kg/m²     General: No acute distress, appears as stated age, nonicteric  Head: Atraumatic, no gross abnormalities or bruises  Neck: Supple and nontender, no carotid bruits, no JVP  Lungs: Clear to auscultation bilaterally, no wheezes, rales, or rhonchi  Heart: Regular rate and rhythm, no murmurs, rubs, or gallops  Abdomen: Soft, nontender, nondistended, normal bowel sounds  Extremities: No obvious deformities, no cyanosis, no edema  Neurological: Alert and oriented x3, EOMI, moving all extremities x4  Psychological: Normal mood and affect, cooperative  Skin: Color, texture, and turgor normal for age          Labs/Imaging/Diagnostics   Personally reviewed:    Lab Results   Component Value Date     02/25/2021     02/25/2021    K 3.3 02/25/2021    K 3.3 02/25/2021    K 2.9 08/03/2020    CL 99 02/25/2021     02/25/2021    CO2 32 02/25/2021    CO2 32 02/25/2021    BUN 15 02/25/2021    CREATININE 1.2 02/25/2021    GLUCOSE 112 02/25/2021    GLUCOSE 120 02/20/2012    CALCIUM 9.0 02/25/2021        Estimated Creatinine Clearance: 41 mL/min (A) (based on SCr of 1.2 mg/dL (H)).     Lab Results   Component Value Date    WBC 5.3 01/15/2021    HGB 11.5 02/25/2021    HCT 35.9 02/25/2021    MCV 80.3 01/15/2021     01/15/2021       Lab Results   Component Value Date    ALT 9 02/25/2021    AST 12 02/25/2021    ALKPHOS 92 02/25/2021    BILITOT 0.6 02/25/2021       Lab Results   Component Value Date    LABALBU 4.1 02/25/2021       Lab Results   Component Value Date    CHOL 169 10/13/2020 CHOL 193 08/04/2020    CHOL 199 06/09/2020     Lab Results   Component Value Date    TRIG 125 10/13/2020    TRIG 314 (H) 08/04/2020    TRIG 98 06/09/2020     Lab Results   Component Value Date    HDL 41 01/15/2021    HDL 42 10/13/2020    HDL 16 08/04/2020     Lab Results   Component Value Date    LDLCALC 107 (H) 01/15/2021    LDLCALC 102 (H) 10/13/2020    LDLCALC 114 (H) 08/04/2020     Lab Results   Component Value Date    LABVLDL 24 01/15/2021    LABVLDL 25 10/13/2020    LABVLDL 63 08/04/2020     No results found for: [facility]    Lab Results   Component Value Date    CKTOTAL 121 01/18/2013    CKMB 4.4 01/15/2013    TROPONINI 0.02 01/15/2013       Lab Results   Component Value Date    BNP 2 01/15/2013         Lab Results   Component Value Date    LABA1C 5.5 10/13/2020     No results found for: EAG     Personally interpreted the following studies.:  EKG: Normal sinus rhythm. Unremarkable        Assessment and Plan:        66-year-old -American female with a history noted above. She has established atherosclerotic vascular disease as evidenced by her prior lacunar strokes 5 years ago. She has no evidence of heart failure on exam or signs or symptoms of significant arrhythmia. Diagnosis Orders   1. Pre-op testing  CTA CORON EJECT FRAC WALL MOTION   2. Essential hypertension  EKG 12 lead   3. Nonrheumatic mitral valve regurgitation  ECHO LIMITED   4. History of stroke  CTA CORON EJECT FRAC WALL MOTION         TTE for surveillance of her valvular insufficiencies especially the mitral.  She is compensated clinically so this will not contraindicate her surgery   Coronary CTA given her high risk for CAD and low functional capacity and impaired mobility   No medication changes for now.   Blood pressure and heart rate are well controlled and she is on chronic clopidogrel for her history of stroke     Assessment and plan revieweed with the patient/family and their questions and concerns answered in full.     Follow up with me in 12 months. Sooner if needed    We appreciate the opportunity to participate in Her care!       Cierra Freeman MD, [facility]  Interventional Cardiology/Structural Heart Disease  Office: [phone]  Fax: [phone]  RN Coordinator: Giovanna Green

## 2021-03-05 NOTE — PATIENT INSTRUCTIONS
1. CT scan of your heart arteries - once done you are cleared for surgery  1. You may stop clopidogrel for surgery as long as needed  2. Echocardiogram (heart ultrasound) - can be done before or after surgery  3. No changes in medications  4.  Return in 12 months

## 2021-03-05 NOTE — PROGRESS NOTES
New CCTA order placed, diagnosis updated. Faxed for IR to review and schedule and Christel Constantino  1945 [phone] (home) [phone] (work)     Called main rad [phone] transferred to [facility] [employer] [facility] Left message for IR in  that  Order was updated with new diagnosis. CCTA need set as soon as possible.       Faxed clinicals and order       Clement Antonio RN

## 2021-03-08 ENCOUNTER — TELEPHONE (OUTPATIENT)
Dept: CARDIOLOGY CLINIC | Age: 76
End: 2021-03-08

## 2021-03-08 NOTE — TELEPHONE ENCOUNTER
· Coronary CTA given her high risk for CAD and low functional capacity and impaired mobility    Called patient and transferred to [phone] to schedule.    Roxana Potter RN

## 2021-03-10 DIAGNOSIS — Z01.818 PRE-OP TESTING: Primary | ICD-10-CM

## 2021-03-11 ENCOUNTER — TELEPHONE (OUTPATIENT)
Dept: CARDIOLOGY CLINIC | Age: 76
End: 2021-03-11

## 2021-03-11 NOTE — TELEPHONE ENCOUNTER
Per DR Nelda Mccoy: He is ok to proceed, please ask him to drink at least 64 oz of water 24-48 hours prior to [facility]  3/19/21     I have reviewed the provider's instructions with the patient, answering all questions to her satisfaction.

## 2021-03-15 ENCOUNTER — HOSPITAL ENCOUNTER (OUTPATIENT)
Age: 76
Discharge: HOME | End: 2021-03-15
Payer: MEDICARE

## 2021-03-15 LAB
ANION GAP SERPL CALCULATED.3IONS-SCNC: 10 MMOL/L (ref 7–16)
BUN BLDV-MCNC: 16 MG/DL (ref 8–23)
CALCIUM SERPL-MCNC: 9.4 MG/DL (ref 8.6–10.2)
CHLORIDE BLD-SCNC: 102 MMOL/L (ref 98–107)
CO2: 28 MMOL/L (ref 22–29)
CREAT SERPL-MCNC: 1.3 MG/DL (ref 0.5–1)
GFR AFRICAN AMERICAN: 48
GFR NON-AFRICAN AMERICAN: 48 ML/MIN/1.73
GLUCOSE BLD-MCNC: 114 MG/DL (ref 74–99)
POTASSIUM SERPL-SCNC: 4 MMOL/L (ref 3.5–5)
SODIUM BLD-SCNC: 140 MMOL/L (ref 132–146)

## 2021-03-15 PROCEDURE — 36415 COLL VENOUS BLD VENIPUNCTURE: CPT

## 2021-03-15 PROCEDURE — 80048 BASIC METABOLIC PNL TOTAL CA: CPT

## 2021-03-19 ENCOUNTER — HOSPITAL ENCOUNTER (OUTPATIENT)
Dept: CT IMAGING | Age: 76
Discharge: HOME | End: 2021-03-21
Payer: MEDICARE

## 2021-03-19 VITALS
DIASTOLIC BLOOD PRESSURE: 73 MMHG | HEART RATE: 56 BPM | RESPIRATION RATE: 18 BRPM | SYSTOLIC BLOOD PRESSURE: 144 MMHG | OXYGEN SATURATION: 97 % | TEMPERATURE: 100 F

## 2021-03-19 DIAGNOSIS — I34.0 MODERATE MITRAL REGURGITATION: ICD-10-CM

## 2021-03-19 DIAGNOSIS — Z86.73 HISTORY OF STROKE: ICD-10-CM

## 2021-03-19 DIAGNOSIS — R06.09 DYSPNEA ON EXERTION: ICD-10-CM

## 2021-03-19 DIAGNOSIS — Z01.818 PRE-OP TESTING: ICD-10-CM

## 2021-03-19 PROCEDURE — 2580000003 HC RX 258: Performed by: INTERNAL MEDICINE

## 2021-03-19 PROCEDURE — 75574 CT ANGIO HRT W/3D IMAGE: CPT

## 2021-03-19 PROCEDURE — 75574 CT ANGIO HRT W/3D IMAGE: CPT | Performed by: INTERNAL MEDICINE

## 2021-03-19 PROCEDURE — 6370000000 HC RX 637 (ALT 250 FOR IP): Performed by: INTERNAL MEDICINE

## 2021-03-19 PROCEDURE — 6360000004 HC RX CONTRAST MEDICATION: Performed by: RADIOLOGY

## 2021-03-19 RX ORDER — 0.9 % SODIUM CHLORIDE 0.9 %
1000 INTRAVENOUS SOLUTION INTRAVENOUS ONCE
Status: COMPLETED | OUTPATIENT
Start: 2021-03-19 | End: 2021-03-19

## 2021-03-19 RX ORDER — NITROGLYCERIN 0.4 MG/1
0.8 TABLET SUBLINGUAL ONCE
Status: COMPLETED | OUTPATIENT
Start: 2021-03-19 | End: 2021-03-19

## 2021-03-19 RX ADMIN — IOPAMIDOL 81 ML: 755 INJECTION, SOLUTION INTRAVENOUS at 11:43

## 2021-03-19 RX ADMIN — METOPROLOL TARTRATE 25 MG: 25 TABLET, FILM COATED ORAL at 10:30

## 2021-03-19 RX ADMIN — SODIUM CHLORIDE 1000 ML: 9 INJECTION, SOLUTION INTRAVENOUS at 10:25

## 2021-03-19 RX ADMIN — NITROGLYCERIN 0.8 MG: 0.4 TABLET SUBLINGUAL at 11:52

## 2021-03-29 ENCOUNTER — TELEPHONE (OUTPATIENT)
Dept: CARDIOLOGY CLINIC | Age: 76
End: 2021-03-29

## 2021-03-29 NOTE — TELEPHONE ENCOUNTER
----- Message from Cierra Freeman MD sent at 3/28/2021 12:36 PM EDT -----  Agree with formal interpretation (minimal epicardial CAD). Cleared for surgery from my standpoint. Awaiting her TTE.

## 2021-03-29 NOTE — TELEPHONE ENCOUNTER
Results given per Dr Silvia Camargo PT voiced understanding.  Spoke to   Stress and ECHO scheduling they will follow up on scheduling limited ECHO First available date will be offered to PT

## 2021-04-23 ENCOUNTER — TELEPHONE (OUTPATIENT)
Dept: CARDIOLOGY | Age: 76
End: 2021-04-23

## 2021-06-10 ENCOUNTER — HOSPITAL ENCOUNTER (OUTPATIENT)
Age: 76
Discharge: HOME | End: 2021-06-10
Payer: MEDICARE

## 2021-06-10 LAB
ANION GAP SERPL CALCULATED.3IONS-SCNC: 9 MMOL/L (ref 7–16)
BASOPHILS ABSOLUTE: 0.02 E9/L (ref 0–0.2)
BASOPHILS RELATIVE PERCENT: 0.5 % (ref 0–2)
BUN BLDV-MCNC: 20 MG/DL (ref 6–23)
CALCIUM SERPL-MCNC: 9.4 MG/DL (ref 8.6–10.2)
CHLORIDE BLD-SCNC: 103 MMOL/L (ref 98–107)
CO2: 29 MMOL/L (ref 22–29)
CREAT SERPL-MCNC: 1.3 MG/DL (ref 0.5–1)
EOSINOPHILS ABSOLUTE: 0.09 E9/L (ref 0.05–0.5)
EOSINOPHILS RELATIVE PERCENT: 2.1 % (ref 0–6)
GFR AFRICAN AMERICAN: 48
GFR NON-AFRICAN AMERICAN: 48 ML/MIN/1.73
GLUCOSE BLD-MCNC: 111 MG/DL (ref 74–99)
HCT VFR BLD CALC: 37.2 % (ref 34–48)
HEMOGLOBIN: 12.1 G/DL (ref 11.5–15.5)
IMMATURE GRANULOCYTES #: 0.01 E9/L
IMMATURE GRANULOCYTES %: 0.2 % (ref 0–5)
LYMPHOCYTES ABSOLUTE: 1.99 E9/L (ref 1.5–4)
LYMPHOCYTES RELATIVE PERCENT: 46.8 % (ref 20–42)
MCH RBC QN AUTO: 26.1 PG (ref 26–35)
MCHC RBC AUTO-ENTMCNC: 32.5 % (ref 32–34.5)
MCV RBC AUTO: 80.3 FL (ref 80–99.9)
MONOCYTES ABSOLUTE: 0.24 E9/L (ref 0.1–0.95)
MONOCYTES RELATIVE PERCENT: 5.6 % (ref 2–12)
NEUTROPHILS ABSOLUTE: 1.9 E9/L (ref 1.8–7.3)
NEUTROPHILS RELATIVE PERCENT: 44.8 % (ref 43–80)
PDW BLD-RTO: 14.2 FL (ref 11.5–15)
PHOSPHORUS: 3.6 MG/DL (ref 2.5–4.5)
PLATELET # BLD: 173 E9/L (ref 130–450)
PMV BLD AUTO: 10.1 FL (ref 7–12)
POTASSIUM SERPL-SCNC: 3.8 MMOL/L (ref 3.5–5)
RBC # BLD: 4.63 E12/L (ref 3.5–5.5)
SODIUM BLD-SCNC: 141 MMOL/L (ref 132–146)
WBC # BLD: 4.3 E9/L (ref 4.5–11.5)

## 2021-06-10 PROCEDURE — 36415 COLL VENOUS BLD VENIPUNCTURE: CPT

## 2021-06-10 PROCEDURE — 80048 BASIC METABOLIC PNL TOTAL CA: CPT

## 2021-06-10 PROCEDURE — 85025 COMPLETE CBC W/AUTO DIFF WBC: CPT

## 2021-06-10 PROCEDURE — 84100 ASSAY OF PHOSPHORUS: CPT

## 2021-10-07 ENCOUNTER — HOSPITAL ENCOUNTER (OUTPATIENT)
Age: 76
Discharge: HOME | End: 2021-10-07
Payer: MEDICARE

## 2021-10-07 LAB
ALBUMIN SERPL-MCNC: 4.5 G/DL (ref 3.5–5.2)
ALP BLD-CCNC: 102 U/L (ref 35–104)
ALT SERPL-CCNC: 8 U/L (ref 0–32)
ANION GAP SERPL CALCULATED.3IONS-SCNC: 10 MMOL/L (ref 7–16)
AST SERPL-CCNC: 12 U/L (ref 0–31)
BASOPHILS ABSOLUTE: 0.03 E9/L (ref 0–0.2)
BASOPHILS RELATIVE PERCENT: 0.5 % (ref 0–2)
BILIRUB SERPL-MCNC: 0.7 MG/DL (ref 0–1.2)
BUN BLDV-MCNC: 20 MG/DL (ref 6–23)
CALCIUM SERPL-MCNC: 9.7 MG/DL (ref 8.6–10.2)
CHLORIDE BLD-SCNC: 102 MMOL/L (ref 98–107)
CHOLESTEROL, TOTAL: 204 MG/DL (ref 0–199)
CO2: 30 MMOL/L (ref 22–29)
CREAT SERPL-MCNC: 1.4 MG/DL (ref 0.5–1)
EOSINOPHILS ABSOLUTE: 0.12 E9/L (ref 0.05–0.5)
EOSINOPHILS RELATIVE PERCENT: 2 % (ref 0–6)
GFR AFRICAN AMERICAN: 44
GFR NON-AFRICAN AMERICAN: 44 ML/MIN/1.73
GLUCOSE BLD-MCNC: 106 MG/DL (ref 74–99)
HBA1C MFR BLD: 5.6 % (ref 4–5.6)
HCT VFR BLD CALC: 39.2 % (ref 34–48)
HDLC SERPL-MCNC: 50 MG/DL
HEMOGLOBIN: 12.9 G/DL (ref 11.5–15.5)
IMMATURE GRANULOCYTES #: 0.02 E9/L
IMMATURE GRANULOCYTES %: 0.3 % (ref 0–5)
LDL CHOLESTEROL CALCULATED: 125 MG/DL (ref 0–99)
LYMPHOCYTES ABSOLUTE: 2.7 E9/L (ref 1.5–4)
LYMPHOCYTES RELATIVE PERCENT: 45.8 % (ref 20–42)
MCH RBC QN AUTO: 26.5 PG (ref 26–35)
MCHC RBC AUTO-ENTMCNC: 32.9 % (ref 32–34.5)
MCV RBC AUTO: 80.5 FL (ref 80–99.9)
MONOCYTES ABSOLUTE: 0.36 E9/L (ref 0.1–0.95)
MONOCYTES RELATIVE PERCENT: 6.1 % (ref 2–12)
NEUTROPHILS ABSOLUTE: 2.66 E9/L (ref 1.8–7.3)
NEUTROPHILS RELATIVE PERCENT: 45.3 % (ref 43–80)
PARATHYROID HORMONE INTACT: 47 PG/ML (ref 15–65)
PDW BLD-RTO: 14.2 FL (ref 11.5–15)
PHOSPHORUS: 3.8 MG/DL (ref 2.5–4.5)
PLATELET # BLD: 156 E9/L (ref 130–450)
PMV BLD AUTO: 9.2 FL (ref 7–12)
POTASSIUM SERPL-SCNC: 3.6 MMOL/L (ref 3.5–5)
RBC # BLD: 4.87 E12/L (ref 3.5–5.5)
SODIUM BLD-SCNC: 142 MMOL/L (ref 132–146)
TOTAL PROTEIN: 7.1 G/DL (ref 6.4–8.3)
TRIGL SERPL-MCNC: 144 MG/DL (ref 0–149)
VITAMIN D 25-HYDROXY: 65 NG/ML (ref 30–100)
VLDLC SERPL CALC-MCNC: 29 MG/DL
WBC # BLD: 5.9 E9/L (ref 4.5–11.5)

## 2021-10-07 PROCEDURE — 83036 HEMOGLOBIN GLYCOSYLATED A1C: CPT

## 2021-10-07 PROCEDURE — 85025 COMPLETE CBC W/AUTO DIFF WBC: CPT

## 2021-10-07 PROCEDURE — 80053 COMPREHEN METABOLIC PANEL: CPT

## 2021-10-07 PROCEDURE — 36415 COLL VENOUS BLD VENIPUNCTURE: CPT

## 2021-10-07 PROCEDURE — 84100 ASSAY OF PHOSPHORUS: CPT

## 2021-10-07 PROCEDURE — 80061 LIPID PANEL: CPT

## 2021-10-07 PROCEDURE — 82306 VITAMIN D 25 HYDROXY: CPT

## 2021-10-07 PROCEDURE — 83970 ASSAY OF PARATHORMONE: CPT

## 2021-12-30 ENCOUNTER — HOSPITAL ENCOUNTER (OUTPATIENT)
Age: 76
Discharge: HOME | End: 2021-12-30
Payer: MEDICARE

## 2021-12-30 LAB
ANION GAP SERPL CALCULATED.3IONS-SCNC: 13 MMOL/L (ref 7–16)
BASOPHILS ABSOLUTE: 0.03 E9/L (ref 0–0.2)
BASOPHILS RELATIVE PERCENT: 0.6 % (ref 0–2)
BUN BLDV-MCNC: 19 MG/DL (ref 6–23)
CALCIUM SERPL-MCNC: 9.6 MG/DL (ref 8.6–10.2)
CHLORIDE BLD-SCNC: 101 MMOL/L (ref 98–107)
CO2: 26 MMOL/L (ref 22–29)
CREAT SERPL-MCNC: 1.3 MG/DL (ref 0.5–1)
EOSINOPHILS ABSOLUTE: 0.12 E9/L (ref 0.05–0.5)
EOSINOPHILS RELATIVE PERCENT: 2.4 % (ref 0–6)
GFR AFRICAN AMERICAN: 48
GFR NON-AFRICAN AMERICAN: 48 ML/MIN/1.73
GLUCOSE BLD-MCNC: 116 MG/DL (ref 74–99)
HCT VFR BLD CALC: 41.5 % (ref 34–48)
HEMOGLOBIN: 13.4 G/DL (ref 11.5–15.5)
IMMATURE GRANULOCYTES #: 0.02 E9/L
IMMATURE GRANULOCYTES %: 0.4 % (ref 0–5)
LYMPHOCYTES ABSOLUTE: 2.42 E9/L (ref 1.5–4)
LYMPHOCYTES RELATIVE PERCENT: 48.1 % (ref 20–42)
MCH RBC QN AUTO: 25.9 PG (ref 26–35)
MCHC RBC AUTO-ENTMCNC: 32.3 % (ref 32–34.5)
MCV RBC AUTO: 80.1 FL (ref 80–99.9)
MONOCYTES ABSOLUTE: 0.35 E9/L (ref 0.1–0.95)
MONOCYTES RELATIVE PERCENT: 7 % (ref 2–12)
NEUTROPHILS ABSOLUTE: 2.09 E9/L (ref 1.8–7.3)
NEUTROPHILS RELATIVE PERCENT: 41.5 % (ref 43–80)
PARATHYROID HORMONE INTACT: 77 PG/ML (ref 15–65)
PDW BLD-RTO: 13.2 FL (ref 11.5–15)
PHOSPHORUS: 3.8 MG/DL (ref 2.5–4.5)
PLATELET # BLD: 163 E9/L (ref 130–450)
PMV BLD AUTO: 9.6 FL (ref 7–12)
POTASSIUM SERPL-SCNC: 3.7 MMOL/L (ref 3.5–5)
RBC # BLD: 5.18 E12/L (ref 3.5–5.5)
SODIUM BLD-SCNC: 140 MMOL/L (ref 132–146)
VITAMIN D 25-HYDROXY: 81 NG/ML (ref 30–100)
WBC # BLD: 5 E9/L (ref 4.5–11.5)

## 2021-12-30 PROCEDURE — 80048 BASIC METABOLIC PNL TOTAL CA: CPT

## 2021-12-30 PROCEDURE — 84100 ASSAY OF PHOSPHORUS: CPT

## 2021-12-30 PROCEDURE — 36415 COLL VENOUS BLD VENIPUNCTURE: CPT

## 2021-12-30 PROCEDURE — 85025 COMPLETE CBC W/AUTO DIFF WBC: CPT

## 2021-12-30 PROCEDURE — 82306 VITAMIN D 25 HYDROXY: CPT

## 2021-12-30 PROCEDURE — 83970 ASSAY OF PARATHORMONE: CPT

## 2022-08-22 ENCOUNTER — HOSPITAL ENCOUNTER (OUTPATIENT)
Age: 77
Discharge: HOME | End: 2022-08-22
Payer: MEDICARE

## 2022-08-22 LAB
ALBUMIN SERPL-MCNC: 4.6 G/DL (ref 3.5–5.2)
ALP BLD-CCNC: 98 U/L (ref 35–104)
ALT SERPL-CCNC: 7 U/L (ref 0–32)
ANION GAP SERPL CALCULATED.3IONS-SCNC: 12 MMOL/L (ref 7–16)
ANION GAP SERPL CALCULATED.3IONS-SCNC: 13 MMOL/L (ref 7–16)
AST SERPL-CCNC: 15 U/L (ref 0–31)
BASOPHILS ABSOLUTE: 0.03 E9/L (ref 0–0.2)
BASOPHILS RELATIVE PERCENT: 0.6 % (ref 0–2)
BILIRUB SERPL-MCNC: 0.6 MG/DL (ref 0–1.2)
BUN BLDV-MCNC: 16 MG/DL (ref 6–23)
BUN BLDV-MCNC: 16 MG/DL (ref 6–23)
CALCIUM SERPL-MCNC: 9.9 MG/DL (ref 8.6–10.2)
CALCIUM SERPL-MCNC: 9.9 MG/DL (ref 8.6–10.2)
CHLORIDE BLD-SCNC: 104 MMOL/L (ref 98–107)
CHLORIDE BLD-SCNC: 104 MMOL/L (ref 98–107)
CHOLESTEROL, TOTAL: 196 MG/DL (ref 0–199)
CHOLESTEROL, TOTAL: 196 MG/DL (ref 0–199)
CO2: 27 MMOL/L (ref 22–29)
CO2: 28 MMOL/L (ref 22–29)
CREAT SERPL-MCNC: 1.3 MG/DL (ref 0.5–1)
CREAT SERPL-MCNC: 1.3 MG/DL (ref 0.5–1)
EOSINOPHILS ABSOLUTE: 0.1 E9/L (ref 0.05–0.5)
EOSINOPHILS RELATIVE PERCENT: 1.9 % (ref 0–6)
GFR AFRICAN AMERICAN: 48
GFR AFRICAN AMERICAN: 48
GFR NON-AFRICAN AMERICAN: 48 ML/MIN/1.73
GFR NON-AFRICAN AMERICAN: 48 ML/MIN/1.73
GLUCOSE BLD-MCNC: 128 MG/DL (ref 74–99)
GLUCOSE BLD-MCNC: 129 MG/DL (ref 74–99)
HBA1C MFR BLD: 5.8 % (ref 4–5.6)
HCT VFR BLD CALC: 38.8 % (ref 34–48)
HCT VFR BLD CALC: 39.7 % (ref 34–48)
HDLC SERPL-MCNC: 50 MG/DL
HDLC SERPL-MCNC: 51 MG/DL
HEMOGLOBIN: 12.7 G/DL (ref 11.5–15.5)
HEMOGLOBIN: 13 G/DL (ref 11.5–15.5)
HOMOCYSTEINE: 11.4 UMOL/L (ref 0–15)
IMMATURE GRANULOCYTES #: 0.01 E9/L
IMMATURE GRANULOCYTES %: 0.2 % (ref 0–5)
LDL CHOLESTEROL CALCULATED: 125 MG/DL (ref 0–99)
LDL CHOLESTEROL CALCULATED: 126 MG/DL (ref 0–99)
LYMPHOCYTES ABSOLUTE: 1.92 E9/L (ref 1.5–4)
LYMPHOCYTES RELATIVE PERCENT: 37.4 % (ref 20–42)
MCH RBC QN AUTO: 26.5 PG (ref 26–35)
MCH RBC QN AUTO: 26.6 PG (ref 26–35)
MCHC RBC AUTO-ENTMCNC: 32.7 % (ref 32–34.5)
MCHC RBC AUTO-ENTMCNC: 32.7 % (ref 32–34.5)
MCV RBC AUTO: 81 FL (ref 80–99.9)
MCV RBC AUTO: 81.2 FL (ref 80–99.9)
MONOCYTES ABSOLUTE: 0.32 E9/L (ref 0.1–0.95)
MONOCYTES RELATIVE PERCENT: 6.2 % (ref 2–12)
NEUTROPHILS ABSOLUTE: 2.75 E9/L (ref 1.8–7.3)
NEUTROPHILS RELATIVE PERCENT: 53.7 % (ref 43–80)
PARATHYROID HORMONE INTACT: 55 PG/ML (ref 15–65)
PDW BLD-RTO: 13.6 FL (ref 11.5–15)
PDW BLD-RTO: 13.8 FL (ref 11.5–15)
PHOSPHORUS: 3.2 MG/DL (ref 2.5–4.5)
PLATELET # BLD: 150 E9/L (ref 130–450)
PLATELET # BLD: 156 E9/L (ref 130–450)
PMV BLD AUTO: 9.6 FL (ref 7–12)
PMV BLD AUTO: 9.7 FL (ref 7–12)
POTASSIUM SERPL-SCNC: 3.5 MMOL/L (ref 3.5–5)
POTASSIUM SERPL-SCNC: 3.6 MMOL/L (ref 3.5–5)
RBC # BLD: 4.79 E12/L (ref 3.5–5.5)
RBC # BLD: 4.89 E12/L (ref 3.5–5.5)
SODIUM BLD-SCNC: 144 MMOL/L (ref 132–146)
SODIUM BLD-SCNC: 144 MMOL/L (ref 132–146)
TOTAL PROTEIN: 7.3 G/DL (ref 6.4–8.3)
TRIGL SERPL-MCNC: 102 MG/DL (ref 0–149)
TRIGL SERPL-MCNC: 102 MG/DL (ref 0–149)
TSH SERPL DL<=0.05 MIU/L-ACNC: 1.52 UIU/ML (ref 0.27–4.2)
VITAMIN D 25-HYDROXY: 79 NG/ML (ref 30–100)
VITAMIN D 25-HYDROXY: 79 NG/ML (ref 30–100)
VLDLC SERPL CALC-MCNC: 20 MG/DL
VLDLC SERPL CALC-MCNC: 20 MG/DL
WBC # BLD: 4.7 E9/L (ref 4.5–11.5)
WBC # BLD: 5.1 E9/L (ref 4.5–11.5)

## 2022-08-22 PROCEDURE — 36415 COLL VENOUS BLD VENIPUNCTURE: CPT

## 2022-08-22 PROCEDURE — 84443 ASSAY THYROID STIM HORMONE: CPT

## 2022-08-22 PROCEDURE — 83090 ASSAY OF HOMOCYSTEINE: CPT

## 2022-08-22 PROCEDURE — 80061 LIPID PANEL: CPT

## 2022-08-22 PROCEDURE — 83036 HEMOGLOBIN GLYCOSYLATED A1C: CPT

## 2022-08-22 PROCEDURE — 84100 ASSAY OF PHOSPHORUS: CPT

## 2022-08-22 PROCEDURE — 85027 COMPLETE CBC AUTOMATED: CPT

## 2022-08-22 PROCEDURE — 83695 ASSAY OF LIPOPROTEIN(A): CPT

## 2022-08-22 PROCEDURE — 82306 VITAMIN D 25 HYDROXY: CPT

## 2022-08-22 PROCEDURE — 80053 COMPREHEN METABOLIC PANEL: CPT

## 2022-08-22 PROCEDURE — 83970 ASSAY OF PARATHORMONE: CPT

## 2022-08-22 PROCEDURE — 85025 COMPLETE CBC W/AUTO DIFF WBC: CPT

## 2022-08-22 PROCEDURE — 80048 BASIC METABOLIC PNL TOTAL CA: CPT

## 2022-08-25 LAB — LIPOPROTEIN (A): 59 MG/DL

## 2023-02-01 ENCOUNTER — OFFICE VISIT (OUTPATIENT)
Dept: FAMILY MEDICINE CLINIC | Age: 78
End: 2023-02-01

## 2023-02-01 ENCOUNTER — HOSPITAL ENCOUNTER (OUTPATIENT)
Age: 78
Discharge: HOME | End: 2023-02-01
Payer: MEDICARE

## 2023-02-01 VITALS
DIASTOLIC BLOOD PRESSURE: 80 MMHG | SYSTOLIC BLOOD PRESSURE: 155 MMHG | BODY MASS INDEX: 26.42 KG/M2 | HEIGHT: 65 IN | WEIGHT: 158.6 LBS | TEMPERATURE: 98.4 F | OXYGEN SATURATION: 99 % | HEART RATE: 66 BPM | RESPIRATION RATE: 16 BRPM

## 2023-02-01 DIAGNOSIS — Z91.81 AT HIGH RISK FOR FALLS: ICD-10-CM

## 2023-02-01 DIAGNOSIS — I50.32 CHRONIC DIASTOLIC CONGESTIVE HEART FAILURE (HCC): ICD-10-CM

## 2023-02-01 DIAGNOSIS — N18.31 STAGE 3A CHRONIC KIDNEY DISEASE (HCC): Chronic | ICD-10-CM

## 2023-02-01 DIAGNOSIS — Z76.0 MEDICATION REFILL: ICD-10-CM

## 2023-02-01 DIAGNOSIS — Z86.73 HISTORY OF CEREBELLAR STROKE: ICD-10-CM

## 2023-02-01 DIAGNOSIS — I10 ESSENTIAL HYPERTENSION: Primary | Chronic | ICD-10-CM

## 2023-02-01 DIAGNOSIS — I10 ESSENTIAL HYPERTENSION: Chronic | ICD-10-CM

## 2023-02-01 PROBLEM — K52.9 ENTERITIS: Status: RESOLVED | Noted: 2020-08-04 | Resolved: 2023-02-01

## 2023-02-01 PROBLEM — M87.052 AVASCULAR NECROSIS OF FEMORAL HEAD, LEFT (HCC): Status: RESOLVED | Noted: 2020-08-04 | Resolved: 2023-02-01

## 2023-02-01 PROBLEM — R19.7 DIARRHEA: Status: RESOLVED | Noted: 2020-08-04 | Resolved: 2023-02-01

## 2023-02-01 PROBLEM — E78.5 HYPERLIPIDEMIA: Status: ACTIVE | Noted: 2022-01-05

## 2023-02-01 PROBLEM — I12.9 BENIGN HYPERTENSIVE RENAL DISEASE: Status: ACTIVE | Noted: 2021-06-01

## 2023-02-01 LAB
ALBUMIN SERPL-MCNC: 4.6 G/DL (ref 3.5–5.2)
ALP BLD-CCNC: 97 U/L (ref 35–104)
ALT SERPL-CCNC: 7 U/L (ref 0–32)
ANION GAP SERPL CALCULATED.3IONS-SCNC: 11 MMOL/L (ref 7–16)
AST SERPL-CCNC: 15 U/L (ref 0–31)
BASOPHILS ABSOLUTE: 0.02 E9/L (ref 0–0.2)
BASOPHILS RELATIVE PERCENT: 0.4 % (ref 0–2)
BILIRUB SERPL-MCNC: 0.6 MG/DL (ref 0–1.2)
BUN BLDV-MCNC: 18 MG/DL (ref 6–23)
CALCIUM SERPL-MCNC: 9.6 MG/DL (ref 8.6–10.2)
CHLORIDE BLD-SCNC: 103 MMOL/L (ref 98–107)
CO2: 28 MMOL/L (ref 22–29)
CREAT SERPL-MCNC: 1.2 MG/DL (ref 0.5–1)
EOSINOPHILS ABSOLUTE: 0.06 E9/L (ref 0.05–0.5)
EOSINOPHILS RELATIVE PERCENT: 1.1 % (ref 0–6)
GFR SERPL CREATININE-BSD FRML MDRD: 47 ML/MIN/1.73
GLUCOSE BLD-MCNC: 116 MG/DL (ref 74–99)
HCT VFR BLD CALC: 39.7 % (ref 34–48)
HEMOGLOBIN: 13 G/DL (ref 11.5–15.5)
IMMATURE GRANULOCYTES #: 0.01 E9/L
IMMATURE GRANULOCYTES %: 0.2 % (ref 0–5)
LYMPHOCYTES ABSOLUTE: 2.27 E9/L (ref 1.5–4)
LYMPHOCYTES RELATIVE PERCENT: 43.5 % (ref 20–42)
MCH RBC QN AUTO: 25.9 PG (ref 26–35)
MCHC RBC AUTO-ENTMCNC: 32.7 % (ref 32–34.5)
MCV RBC AUTO: 79.1 FL (ref 80–99.9)
MONOCYTES ABSOLUTE: 0.33 E9/L (ref 0.1–0.95)
MONOCYTES RELATIVE PERCENT: 6.3 % (ref 2–12)
NEUTROPHILS ABSOLUTE: 2.53 E9/L (ref 1.8–7.3)
NEUTROPHILS RELATIVE PERCENT: 48.5 % (ref 43–80)
PDW BLD-RTO: 14.3 FL (ref 11.5–15)
PLATELET # BLD: 157 E9/L (ref 130–450)
PMV BLD AUTO: 9.6 FL (ref 7–12)
POTASSIUM SERPL-SCNC: 3.9 MMOL/L (ref 3.5–5)
RBC # BLD: 5.02 E12/L (ref 3.5–5.5)
SODIUM BLD-SCNC: 142 MMOL/L (ref 132–146)
TOTAL PROTEIN: 7.2 G/DL (ref 6.4–8.3)
WBC # BLD: 5.2 E9/L (ref 4.5–11.5)

## 2023-02-01 PROCEDURE — 36415 COLL VENOUS BLD VENIPUNCTURE: CPT

## 2023-02-01 PROCEDURE — 85025 COMPLETE CBC W/AUTO DIFF WBC: CPT

## 2023-02-01 PROCEDURE — 80053 COMPREHEN METABOLIC PANEL: CPT

## 2023-02-01 PROCEDURE — 80061 LIPID PANEL: CPT

## 2023-02-01 RX ORDER — TIMOLOL MALEATE 5 MG/ML
SOLUTION/ DROPS OPHTHALMIC
COMMUNITY
Start: 2023-01-20

## 2023-02-01 RX ORDER — SENNA PLUS 8.6 MG/1
1 TABLET ORAL DAILY
COMMUNITY

## 2023-02-01 RX ORDER — BRIMONIDINE TARTRATE 2 MG/ML
SOLUTION/ DROPS OPHTHALMIC
COMMUNITY
Start: 2023-01-20 | End: 2023-02-01

## 2023-02-01 RX ORDER — ASPIRIN 81 MG/1
81 TABLET ORAL DAILY
Qty: 90 TABLET | Refills: 1
Start: 2023-02-01

## 2023-02-01 RX ORDER — ALPRAZOLAM 1 MG/1
1 TABLET ORAL 3 TIMES DAILY PRN
Qty: 90 TABLET | Refills: 0 | Status: SHIPPED | OUTPATIENT
Start: 2023-02-01 | End: 2023-05-02

## 2023-02-01 RX ORDER — AMLODIPINE BESYLATE 5 MG/1
5 TABLET ORAL DAILY
Qty: 90 TABLET | Refills: 0 | Status: SHIPPED | OUTPATIENT
Start: 2023-02-01

## 2023-02-01 SDOH — ECONOMIC STABILITY: HOUSING INSECURITY
IN THE LAST 12 MONTHS, WAS THERE A TIME WHEN YOU DID NOT HAVE A STEADY PLACE TO SLEEP OR SLEPT IN A SHELTER (INCLUDING NOW)?: NO

## 2023-02-01 SDOH — ECONOMIC STABILITY: FOOD INSECURITY: WITHIN THE PAST 12 MONTHS, THE FOOD YOU BOUGHT JUST DIDN'T LAST AND YOU DIDN'T HAVE MONEY TO GET MORE.: NEVER TRUE

## 2023-02-01 SDOH — ECONOMIC STABILITY: INCOME INSECURITY: HOW HARD IS IT FOR YOU TO PAY FOR THE VERY BASICS LIKE FOOD, HOUSING, MEDICAL CARE, AND HEATING?: NOT HARD AT ALL

## 2023-02-01 SDOH — ECONOMIC STABILITY: FOOD INSECURITY: WITHIN THE PAST 12 MONTHS, YOU WORRIED THAT YOUR FOOD WOULD RUN OUT BEFORE YOU GOT MONEY TO BUY MORE.: NEVER TRUE

## 2023-02-01 ASSESSMENT — PATIENT HEALTH QUESTIONNAIRE - PHQ9
SUM OF ALL RESPONSES TO PHQ QUESTIONS 1-9: 0
SUM OF ALL RESPONSES TO PHQ9 QUESTIONS 1 & 2: 0
SUM OF ALL RESPONSES TO PHQ QUESTIONS 1-9: 0
SUM OF ALL RESPONSES TO PHQ QUESTIONS 1-9: 0
1. LITTLE INTEREST OR PLEASURE IN DOING THINGS: 0
SUM OF ALL RESPONSES TO PHQ QUESTIONS 1-9: 0
2. FEELING DOWN, DEPRESSED OR HOPELESS: 0

## 2023-02-01 NOTE — PATIENT INSTRUCTIONS
Stop hctz 12.5mg   Start amlodipine 5mg  Return for blood pressure check in 2 weeks  Return in 1 mo, get bloodwork done before  Start baby aspirin 81mg

## 2023-02-01 NOTE — PROGRESS NOTES
Patient:  Erin Potter 68 y.o. female     02/01/23      Chiefcomplaint:   Chief Complaint   Patient presents with    Establish Care     Problems and Overview     New patient to establish care    CKD 3 - gfr 48   Lab Results   Component Value Date    CREATININE 1.3 (H) 08/22/2022     Retinal detachment x 2 - follows specialist - using eye drops - vision is affected in that eye    HLD - lovastatin - ct coronary - mild plaque    Hx stroke -went to someone in the saw evidence that maybe she had a stroke when looking in her eye. She is not aware of any obvious stroke and never went to the emergency department for stroke. She did have an episode where her balance was off somewhat recently and she fell in the shower. She is not currently on baby aspirin and LDL is elevated  Lab Results   Component Value Date    LDLCALC 125 (H) 08/22/2022     Impression   · The LAD has small calcified plaque with 10% stenosis in the proximal segment       · Left circumflex artery has calcified plaque with less than 10% stenosis in   the proximal segment       · Ramus intermedius has a calcified plaque with 20 to 30% stenosis in the   proximal segment       · The RCA has a calcified plaque in the proximal segment with 10% stenosis. · Normal LV size and function, LVEF 75%       · Total Calcium score of 24 with minimal plaque burden. CAD-RADS: 2. Maximal stenosis 25 to 49%. Interpretation, mild   nonobstructive CAD. Further cardiac investigation, none     Impression   ALERT:  THIS IS AN ABNORMAL REPORT   1. There is discontinuity/loss of opacification of the posterior and   lateral left semicircular canals. 2. Internal debris within the posterior right semicircular canal.   Further evaluation with CT of the temporal bones without contrast with   thin cuts of both the right and left sides is recommended. 3. Partially empty sella. 4. Mild to moderate cerebral volume loss/atrophy.    5. Mild white matter changes, compatible with sequelae of small vessel   ischemic disease. 6. Remote lacunar infarctions involving the inferior mesial left   cerebellum, the mid left superior cerebellum, and the bilateral   thalami. Pancreatic lesion - mri 2020 and then EUS - no mass  Impression:           - Mild dilation of the pancreatic duct in the                         head/uncinate without focal mass. - There was no sign of significant pathology in the                         rest of the examined pancreas. - No specimens collected. Estimated Blood Loss: Estimated blood loss: none. Recommendation:       - Discharge patient to home (ambulatory). - Resume previous diet today. - Continue present medications.                         - Repeat CT pancreas in 3 months. Impression       1. 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 Diastolic dysfunction 1192  Summary   Left ventricular chamber size and wall motion normal, EF 65%. Stage 2 diastolic dysfunction. Left atrium is of normal size. Interatrial septum not well visualized but appears intact. Normal right ventricle structure and function. Moderate mitral regurgitation. ERO 0.2cm2, PISA 0.8cm, RV 37cc   No mitral valve prolapse. Normal aortic valve structure   There is mild aortic regurgitation. There is mild tricuspid regurgitation. Normal aortic root and ascending aorta. No evidence of pericardial effusion. No intracardiac mass.     Patient Active Problem List    Diagnosis Date Noted    Chronic diastolic congestive heart failure ([facility] [address] 02/01/2023     Priority: Medium    History of cerebellar stroke 02/01/2023     Priority: Medium    Hyperlipidemia 01/05/2022     Priority: Medium    Benign hypertensive renal disease 06/01/2021 Priority: Medium    CKD (chronic kidney disease) stage 3, GFR 30-59 ml/min ([facility]) 08/05/2020    Pancreatic lesion 08/04/2020    Essential hypertension 02/16/2012      Prevention:  Health Maintenance Due   Topic Date Due    Depression Screen  Never done    DTaP/Tdap/Td vaccine (1 - Tdap) Never done    DEXA (modify frequency per FRAX score)  Never done    Shingles vaccine (2 of 3) 01/02/2017    Annual Wellness Visit (AWV)  Never done    COVID-19 Vaccine (5 - Booster for Pfizer series) 07/27/2022      Meds prior:  Current Outpatient Medications   Medication Instructions    ALPRAZolam (XANAX) 1 mg, Oral, 3 TIMES DAILY PRN,      amLODIPine (NORVASC) 5 mg, Oral, DAILY    aspirin EC 81 mg, Oral, DAILY    brimonidine (ALPHAGAN) 0.2 % ophthalmic solution INSTILL 1 DROP INTO EACH EYE TWICE DAILY    brimonidine-timolol (COMBIGAN) 0.2-0.5 % ophthalmic solution 1 drop, EVERY 12 HOURS    Cholecalciferol 50 MCG (2000 UT) CAPS 1 capsule, EVERY OTHER DAY    clopidogrel (PLAVIX) 75 mg, DAILY    latanoprost (XALATAN) 0.005 % ophthalmic solution 1 drop, Both Eyes, NIGHTLY    losartan (COZAAR) 50 mg, Oral, DAILY    lovastatin (MEVACOR) 40 mg, NIGHTLY    morphine (MS CONTIN) 30 mg, Oral, 2 TIMES DAILY,      Multiple Vitamins-Minerals (CENTRUM ADULTS PO) Oral    polyethyl glycol-propyl glycol 0.4-0.3 % (SYSTANE) 0.4-0.3 % ophthalmic solution 1 drop, PRN    senna (SENOKOT) 8.6 MG tablet 1 tablet, Oral, DAILY    timolol (TIMOPTIC) 0.5 % ophthalmic solution INSTILL 1 DROP INTO EACH EYE TWICE DAILY    vitamin C (ASCORBIC ACID) 500 mg, DAILY    vitamin D (CHOLECALCIFEROL) 1,000 Units, Oral, EVERY OTHER DAY, Last taken Monday 8/3      Physical Exam   Vitals: BP (!) 155/80 (Site: Right Upper Arm, Position: Sitting, Cuff Size: Medium Adult)   Pulse 66   Temp 98.4 °F (36.9 °C)   Resp 16   Ht 5' 5\" (1.651 m)   Wt 158 lb 9.6 oz (71.9 kg)   SpO2 99%   BMI 26.39 kg/m²   General Appearance: Alert, oriented, no acute distress  HEENT: No scleral icterus. No visible discharge from eyes  Neck: Not rigid. No visible masses  Chest wall/Lung: Clear to auscultation bilaterally,  respirations unlabored. No ronchi/wheezing/rales  Heart: RRR  Extremities:  No edema  Skin: No rashes. No jaundice  Neuro: Alert and oriented        Psych: Appropriate mood and appropriate affect  Assessment and Plan   Patient with uncontrolled blood pressure. Plan to stop HCTZ and start amlodipine and have her come back in 2 weeks for blood pressure check and for consideration of increasing in dose. CKD is monitored by specialist and last creatinine is stable and she does not use NSAIDs so no significant change plan other than stopping HCTZ. Patient with history of stroke which she was not really aware of. Recommend starting higher intensity statin if LDL remains uncontrolled. We will check labs today. We will start low-dose aspirin. We will consider repeat MRI given recent fall. Patient with chronic diastolic heart failure noted on previous echo with no new symptoms today. We will get better control of blood pressure. Patient with high risk medication use of Xanax given comorbidities and age above 72 however she has been on this for decades without difficulty. Today we discussed the risks of this medication and agreed to continue trying to taper off of it as able. For now we will refill for this month and then see if we can make other medication changes to reduce her risk. Essential hypertension  -     CBC with Auto Differential; Future  -     Lipid Panel; Future  -     Comprehensive Metabolic Panel; Future  -     amLODIPine (NORVASC) 5 MG tablet; Take 1 tablet by mouth daily, Disp-90 tablet, [id number]  Chronic diastolic congestive heart failure (HCC)  History of cerebellar stroke  Stage 3a chronic kidney disease (HCC)  Medication refill  -     ALPRAZolam (XANAX) 1 MG tablet; Take 1 tablet by mouth 3 times daily as needed for Anxiety for up to 90 days.    Max Daily Amount: 3 mg, Disp-90 tablet, R-0Normal  -     aspirin EC 81 MG EC tablet; Take 1 tablet by mouth daily, Disp-90 tablet, R-1Adjust Sig  At high risk for falls    Return in about 4 weeks (around 3/1/2023). Noman Ivey DO     This document may have been prepared at least partially through the use of voice recognition software. Although effort is taken to assure the accuracy of this document, it is possible that grammatical, syntax,  or spelling errors may occur. On the basis of positive falls risk screening, assessment and plan is as follows: home safety tips provided, medications adjusted- stop diuretic . On this date 2/1/2023     40 min reviewing previous notes, test results and face to face with the patient discussing the diagnosis and importance of compliance with the treatment plan as well as documenting on the day of the visit.

## 2023-02-02 LAB
CHOLESTEROL, TOTAL: 186 MG/DL (ref 0–199)
HDLC SERPL-MCNC: 52 MG/DL
LDL CHOLESTEROL CALCULATED: 116 MG/DL (ref 0–99)
TRIGL SERPL-MCNC: 91 MG/DL (ref 0–149)
VLDLC SERPL CALC-MCNC: 18 MG/DL

## 2023-02-02 RX ORDER — ROSUVASTATIN CALCIUM 10 MG/1
10 TABLET, COATED ORAL DAILY
Qty: 90 TABLET | Refills: 1 | Status: CANCELLED | OUTPATIENT
Start: 2023-02-02

## 2023-02-03 DIAGNOSIS — E78.2 MIXED HYPERLIPIDEMIA: Primary | ICD-10-CM

## 2023-02-03 RX ORDER — ROSUVASTATIN CALCIUM 10 MG/1
10 TABLET, COATED ORAL NIGHTLY
Qty: 90 TABLET | Refills: 1 | Status: SHIPPED | OUTPATIENT
Start: 2023-02-03 | End: 2023-08-02

## 2023-02-08 DIAGNOSIS — I10 ESSENTIAL HYPERTENSION: Chronic | ICD-10-CM

## 2023-02-08 DIAGNOSIS — E78.2 MIXED HYPERLIPIDEMIA: ICD-10-CM

## 2023-02-09 RX ORDER — CLOPIDOGREL BISULFATE 75 MG/1
75 TABLET ORAL DAILY
Qty: 90 TABLET | Refills: 1 | Status: SHIPPED | OUTPATIENT
Start: 2023-02-09

## 2023-02-09 NOTE — TELEPHONE ENCOUNTER
clopidogrel (PLAVIX) 75 MG tablet    Wants  to send in a script for med to [facility]. She can't get it at the pharmacy because it is under Dr. Kaci Girard name.

## 2023-02-13 ENCOUNTER — NURSE ONLY (OUTPATIENT)
Dept: FAMILY MEDICINE CLINIC | Age: 78
End: 2023-02-13

## 2023-02-13 ENCOUNTER — TELEPHONE (OUTPATIENT)
Dept: FAMILY MEDICINE CLINIC | Age: 78
End: 2023-02-13

## 2023-02-13 VITALS — DIASTOLIC BLOOD PRESSURE: 76 MMHG | SYSTOLIC BLOOD PRESSURE: 117 MMHG

## 2023-02-13 DIAGNOSIS — I12.9 BENIGN HYPERTENSIVE RENAL DISEASE: Primary | ICD-10-CM

## 2023-02-13 PROCEDURE — 99999 PR OFFICE/OUTPT VISIT,PROCEDURE ONLY: CPT | Performed by: STUDENT IN AN ORGANIZED HEALTH CARE EDUCATION/TRAINING PROGRAM

## 2023-02-15 ENCOUNTER — TELEPHONE (OUTPATIENT)
Dept: FAMILY MEDICINE CLINIC | Age: 78
End: 2023-02-15

## 2023-02-17 ENCOUNTER — OFFICE VISIT (OUTPATIENT)
Dept: FAMILY MEDICINE CLINIC | Age: 78
End: 2023-02-17

## 2023-02-17 VITALS
DIASTOLIC BLOOD PRESSURE: 77 MMHG | WEIGHT: 162.2 LBS | RESPIRATION RATE: 20 BRPM | BODY MASS INDEX: 27.02 KG/M2 | SYSTOLIC BLOOD PRESSURE: 153 MMHG | HEART RATE: 71 BPM | HEIGHT: 65 IN | TEMPERATURE: 97.3 F

## 2023-02-17 DIAGNOSIS — E78.2 MIXED HYPERLIPIDEMIA: ICD-10-CM

## 2023-02-17 DIAGNOSIS — I10 ESSENTIAL HYPERTENSION: Primary | Chronic | ICD-10-CM

## 2023-02-17 PROBLEM — H40.10X0 OPEN-ANGLE GLAUCOMA OF RIGHT EYE: Status: ACTIVE | Noted: 2023-02-17

## 2023-02-17 RX ORDER — BRIMONIDINE TARTRATE 2 MG/ML
SOLUTION/ DROPS OPHTHALMIC
COMMUNITY
Start: 2023-02-14

## 2023-02-17 NOTE — PROGRESS NOTES
Patient:  Lizandro Olguin 68 y.o. female     02/17/23      Chiefcomplaint:   Chief Complaint   Patient presents with    Hypertension     Follow up       Problems and Overview   We were working on blood pressure. We took her off HCTZ because pressures were abnormal and she had a fall. Her blood pressures seem to spike at home since then. However she was under a lot of stress when she took this pressure. At other times when she is taking her blood pressure has been normal in the 695 systolic    CKD 3 - gfr 48   Lab Results   Component Value Date    CREATININE 1.2 (H) 02/01/2023     Retinal detachment x 2 - follows specialist - using eye drops - vision is affected in that eye    HLD - lovastatin - ct coronary - mild plaque    Hx stroke -went to someone in the saw evidence that maybe she had a stroke when looking in her eye. She is not aware of any obvious stroke and never went to the emergency department for stroke. She did have an episode where her balance was off somewhat recently and she fell in the shower. She is not currently on baby aspirin and LDL is elevated  Lab Results   Component Value Date    LDLCALC 116 (H) 02/01/2023     Impression   · The LAD has small calcified plaque with 10% stenosis in the proximal segment       · Left circumflex artery has calcified plaque with less than 10% stenosis in   the proximal segment       · Ramus intermedius has a calcified plaque with 20 to 30% stenosis in the   proximal segment       · The RCA has a calcified plaque in the proximal segment with 10% stenosis. · Normal LV size and function, LVEF 75%       · Total Calcium score of 24 with minimal plaque burden. CAD-RADS: 2. Maximal stenosis 25 to 49%. Interpretation, mild   nonobstructive CAD. Further cardiac investigation, none     Impression   ALERT:  THIS IS AN ABNORMAL REPORT   1. There is discontinuity/loss of opacification of the posterior and   lateral left semicircular canals.    2. Internal debris within the posterior right semicircular canal.   Further evaluation with CT of the temporal bones without contrast with   thin cuts of both the right and left sides is recommended. 3. Partially empty sella. 4. Mild to moderate cerebral volume loss/atrophy. 5. Mild white matter changes, compatible with sequelae of small vessel   ischemic disease. 6. Remote lacunar infarctions involving the inferior mesial left   cerebellum, the mid left superior cerebellum, and the bilateral   thalami. Pancreatic lesion - mri 2020 and then EUS - no mass  Impression:           - Mild dilation of the pancreatic duct in the                         head/uncinate without focal mass. - There was no sign of significant pathology in the                         rest of the examined pancreas. - No specimens collected. Estimated Blood Loss: Estimated blood loss: none. Recommendation:       - Discharge patient to home (ambulatory). - Resume previous diet today. - Continue present medications.                         - Repeat CT pancreas in 3 months. Impression       1. 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 Diastolic dysfunction 5683  Summary   Left ventricular chamber size and wall motion normal, EF 65%. Stage 2 diastolic dysfunction. Left atrium is of normal size. Interatrial septum not well visualized but appears intact. Normal right ventricle structure and function. Moderate mitral regurgitation. ERO 0.2cm2, PISA 0.8cm, RV 37cc   No mitral valve prolapse. Normal aortic valve structure   There is mild aortic regurgitation. There is mild tricuspid regurgitation. Normal aortic root and ascending aorta. No evidence of pericardial effusion.    No intracardiac mass.    Patient Active Problem List    Diagnosis Date Noted    Open-angle glaucoma of right eye 02/17/2023     Priority: Medium    Chronic diastolic congestive heart failure ([address] 02/01/2023     Priority: Medium    History of cerebellar stroke 02/01/2023     Priority: Medium    Hyperlipidemia 01/05/2022     Priority: Medium    Benign hypertensive renal disease 06/01/2021     Priority: Medium    CKD (chronic kidney disease) stage 3, GFR 30-59 ml/min (HCC) 08/05/2020    Pancreatic lesion 08/04/2020    Essential hypertension 02/16/2012      Prevention:  Health Maintenance Due   Topic Date Due    DTaP/Tdap/Td vaccine (1 - Tdap) Never done    DEXA (modify frequency per FRAX score)  Never done    Shingles vaccine (2 of 3) 01/02/2017    Annual Wellness Visit (AWV)  Never done    COVID-19 Vaccine (5 - Booster for Pfizer series) 07/27/2022      Meds prior:  Current Outpatient Medications   Medication Instructions    ALPRAZolam (XANAX) 1 mg, Oral, 3 TIMES DAILY PRN,      amLODIPine (NORVASC) 5 mg, Oral, DAILY    aspirin EC 81 mg, Oral, DAILY    brimonidine (ALPHAGAN) 0.2 % ophthalmic solution INSTILL 1 DROP INTO EACH EYE TWICE DAILY    brimonidine-timolol (COMBIGAN) 0.2-0.5 % ophthalmic solution 1 drop, EVERY 12 HOURS    clopidogrel (PLAVIX) 75 mg, Oral, DAILY,      latanoprost (XALATAN) 0.005 % ophthalmic solution 1 drop, Both Eyes, NIGHTLY    losartan (COZAAR) 50 mg, Oral, DAILY    morphine (MS CONTIN) 30 mg, Oral, 2 TIMES DAILY,      Multiple Vitamins-Minerals (CENTRUM ADULTS PO) Oral    rosuvastatin (CRESTOR) 10 mg, Oral, NIGHTLY    senna (SENOKOT) 8.6 MG tablet 1 tablet, Oral, DAILY    timolol (TIMOPTIC) 0.5 % ophthalmic solution INSTILL 1 DROP INTO EACH EYE TWICE DAILY    vitamin D (CHOLECALCIFEROL) 1,000 Units, Oral, EVERY OTHER DAY, Last taken Monday 8/3      Physical Exam   Vitals: BP (!) 153/77   Pulse 71   Temp 97.3 °F (36.3 °C) (Temporal)   Resp 20   Ht 5' 5\" (1.651 m)   Wt 162 lb 3.2 oz (73.6 kg)   BMI 26.99 kg/m²   General Appearance: Alert, oriented, no acute distress  HEENT: No scleral icterus. No visible discharge from eyes  Neck: Not rigid. No visible masses  Chest wall/Lung: Clear to auscultation bilaterally,  respirations unlabored. No ronchi/wheezing/rales  Heart: RRR  Extremities:  No edema  Skin: No rashes. No jaundice  Neuro: Alert and oriented        Psych: Appropriate mood and appropriate affect  Assessment and Plan   Patient doing well I believe on amlodipine after stopping HCTZ. She does have normal blood pressures at home generally when she is not under a lot of stress. Her family does cause her some stress and the blood pressure elevates. We will continue to monitor on current regimen. We will recheck lipid panel in 2 months for evaluation of response to Crestor which we started because patient has history of stroke and remains with elevated LDL. Essential hypertension  Mixed hyperlipidemia  -     LIPID PANEL; Future    Return in about 2 months (around 4/17/2023). Dany Reyna,      This document may have been prepared at least partially through the use of voice recognition software. Although effort is taken to assure the accuracy of this document, it is possible that grammatical, syntax,  or spelling errors may occur. On the basis of positive falls risk screening, assessment and plan is as follows: home safety tips provided, medications adjusted- stop diuretic . On this date 2/1/2023     40 min reviewing previous notes, test results and face to face with the patient discussing the diagnosis and importance of compliance with the treatment plan as well as documenting on the day of the visit.

## 2023-04-18 ENCOUNTER — OFFICE VISIT (OUTPATIENT)
Dept: FAMILY MEDICINE CLINIC | Age: 78
End: 2023-04-18

## 2023-04-18 VITALS
OXYGEN SATURATION: 98 % | TEMPERATURE: 98 F | SYSTOLIC BLOOD PRESSURE: 133 MMHG | BODY MASS INDEX: 26.66 KG/M2 | RESPIRATION RATE: 16 BRPM | HEART RATE: 71 BPM | DIASTOLIC BLOOD PRESSURE: 75 MMHG | HEIGHT: 65 IN | WEIGHT: 160 LBS

## 2023-04-18 DIAGNOSIS — Z76.0 MEDICATION REFILL: ICD-10-CM

## 2023-04-18 DIAGNOSIS — E78.2 MIXED HYPERLIPIDEMIA: ICD-10-CM

## 2023-04-18 DIAGNOSIS — I50.32 CHRONIC DIASTOLIC CONGESTIVE HEART FAILURE (HCC): ICD-10-CM

## 2023-04-18 DIAGNOSIS — I10 ESSENTIAL HYPERTENSION: Chronic | ICD-10-CM

## 2023-04-18 DIAGNOSIS — N18.31 STAGE 3A CHRONIC KIDNEY DISEASE (HCC): Primary | Chronic | ICD-10-CM

## 2023-04-18 RX ORDER — ALPRAZOLAM 1 MG/1
1 TABLET ORAL 3 TIMES DAILY PRN
Qty: 90 TABLET | Refills: 0 | Status: CANCELLED | OUTPATIENT
Start: 2023-04-18 | End: 2023-07-17

## 2023-04-18 RX ORDER — AMLODIPINE BESYLATE 5 MG/1
5 TABLET ORAL DAILY
Qty: 90 TABLET | Refills: 1 | Status: SHIPPED | OUTPATIENT
Start: 2023-04-18

## 2023-04-18 NOTE — PROGRESS NOTES
distress  HEENT: No scleral icterus. No visible discharge from eyes  Neck: Not rigid. No visible masses  Chest wall/Lung: Clear to auscultation bilaterally,  respirations unlabored. No ronchi/wheezing/rales  Heart: RRR  Extremities:  No edema  Skin: No rashes. No jaundice  Neuro: Alert and oriented        Psych: Appropriate mood and appropriate affect  Assessment and Plan   Patient doing well I believe on amlodipine   She does have normal blood pressures at home generally when she is not under a lot of stress. Her family does cause her some stress and the blood pressure elevates. We will continue to monitor on current regimen. We will recheck lipid panel for evaluation of response to Crestor which we started because patient has history of stroke and remains with elevated LDL despite lovastatin. CKD following specialist and no change in creatinine. Diastolic chf without new edema, cp or sob. Stable. No change. Stage 3a chronic kidney disease (HCC)  Chronic diastolic congestive heart failure (HCC)  Essential hypertension  Mixed hyperlipidemia  Medication refill      Return in about 4 months (around 8/18/2023) for awv and htn fu. Dagmar Boas, DO     This document may have been prepared at least partially through the use of voice recognition software. Although effort is taken to assure the accuracy of this document, it is possible that grammatical, syntax,  or spelling errors may occur. On the basis of positive falls risk screening, assessment and plan is as follows: home safety tips provided, medications adjusted- stop diuretic . On this date 2/1/2023     40 min reviewing previous notes, test results and face to face with the patient discussing the diagnosis and importance of compliance with the treatment plan as well as documenting on the day of the visit.

## 2023-05-02 ENCOUNTER — TELEPHONE (OUTPATIENT)
Dept: FAMILY MEDICINE CLINIC | Age: 78
End: 2023-05-02

## 2023-05-02 DIAGNOSIS — Z76.0 MEDICATION REFILL: ICD-10-CM

## 2023-05-02 RX ORDER — ALPRAZOLAM 1 MG/1
1 TABLET ORAL 3 TIMES DAILY PRN
Qty: 90 TABLET | Refills: 0 | Status: SHIPPED | OUTPATIENT
Start: 2023-05-02 | End: 2023-07-31

## 2023-06-05 ENCOUNTER — HOSPITAL ENCOUNTER (OUTPATIENT)
Age: 78
Discharge: HOME | End: 2023-06-05
Payer: MEDICARE

## 2023-06-05 LAB
ALBUMIN SERPL-MCNC: 4.2 G/DL (ref 3.5–5.2)
ALP SERPL-CCNC: 109 U/L (ref 35–104)
ALT SERPL-CCNC: 11 U/L (ref 0–32)
ANION GAP SERPL CALCULATED.3IONS-SCNC: 10 MMOL/L (ref 7–16)
AST SERPL-CCNC: 15 U/L (ref 0–31)
BASOPHILS # BLD: 0.02 E9/L (ref 0–0.2)
BASOPHILS NFR BLD: 0.4 % (ref 0–2)
BILIRUB SERPL-MCNC: 0.4 MG/DL (ref 0–1.2)
BUN SERPL-MCNC: 13 MG/DL (ref 6–23)
CALCIUM SERPL-MCNC: 9.3 MG/DL (ref 8.6–10.2)
CHLORIDE SERPL-SCNC: 106 MMOL/L (ref 98–107)
CHOLESTEROL, TOTAL: 144 MG/DL (ref 0–199)
CO2 SERPL-SCNC: 28 MMOL/L (ref 22–29)
CREAT SERPL-MCNC: 1.2 MG/DL (ref 0.5–1)
CREAT UR-MCNC: 432 MG/DL (ref 29–226)
EOSINOPHIL # BLD: 0.12 E9/L (ref 0.05–0.5)
EOSINOPHIL NFR BLD: 2.5 % (ref 0–6)
ERYTHROCYTE [DISTWIDTH] IN BLOOD BY AUTOMATED COUNT: 14.2 FL (ref 11.5–15)
GLUCOSE SERPL-MCNC: 112 MG/DL (ref 74–99)
HBA1C MFR BLD: 5.9 % (ref 4–5.6)
HCT VFR BLD AUTO: 38.7 % (ref 34–48)
HDLC SERPL-MCNC: 56 MG/DL
HGB BLD-MCNC: 12.4 G/DL (ref 11.5–15.5)
IMM GRANULOCYTES # BLD: 0.01 E9/L
IMM GRANULOCYTES NFR BLD: 0.2 % (ref 0–5)
LDLC SERPL CALC-MCNC: 74 MG/DL (ref 0–99)
LYMPHOCYTES # BLD: 2.03 E9/L (ref 1.5–4)
LYMPHOCYTES NFR BLD: 41.9 % (ref 20–42)
MCH RBC QN AUTO: 26.1 PG (ref 26–35)
MCHC RBC AUTO-ENTMCNC: 32 % (ref 32–34.5)
MCV RBC AUTO: 81.3 FL (ref 80–99.9)
MICROALBUMIN UR-MCNC: 22.1 MG/L
MICROALBUMIN/CREAT UR-RTO: 5.1 (ref 0–30)
MONOCYTES # BLD: 0.32 E9/L (ref 0.1–0.95)
MONOCYTES NFR BLD: 6.6 % (ref 2–12)
NEUTROPHILS # BLD: 2.34 E9/L (ref 1.8–7.3)
NEUTS SEG NFR BLD: 48.4 % (ref 43–80)
PHOSPHATE SERPL-MCNC: 3.3 MG/DL (ref 2.5–4.5)
PLATELET # BLD AUTO: 167 E9/L (ref 130–450)
PMV BLD AUTO: 9.5 FL (ref 7–12)
POTASSIUM SERPL-SCNC: 4.6 MMOL/L (ref 3.5–5)
PROT SERPL-MCNC: 6.8 G/DL (ref 6.4–8.3)
PTH-INTACT SERPL-MCNC: 65 PG/ML (ref 15–65)
RBC # BLD AUTO: 4.76 E12/L (ref 3.5–5.5)
SODIUM SERPL-SCNC: 144 MMOL/L (ref 132–146)
TRIGL SERPL-MCNC: 72 MG/DL (ref 0–149)
VITAMIN D 25-HYDROXY: 46 NG/ML (ref 30–100)
VLDLC SERPL CALC-MCNC: 14 MG/DL
WBC # BLD: 4.8 E9/L (ref 4.5–11.5)

## 2023-06-05 PROCEDURE — 83970 ASSAY OF PARATHORMONE: CPT

## 2023-06-05 PROCEDURE — 80061 LIPID PANEL: CPT

## 2023-06-05 PROCEDURE — 85025 COMPLETE CBC W/AUTO DIFF WBC: CPT

## 2023-06-05 PROCEDURE — 36415 COLL VENOUS BLD VENIPUNCTURE: CPT

## 2023-06-05 PROCEDURE — 83036 HEMOGLOBIN GLYCOSYLATED A1C: CPT

## 2023-06-05 PROCEDURE — 84100 ASSAY OF PHOSPHORUS: CPT

## 2023-06-05 PROCEDURE — 82570 ASSAY OF URINE CREATININE: CPT

## 2023-06-05 PROCEDURE — 80053 COMPREHEN METABOLIC PANEL: CPT

## 2023-06-05 PROCEDURE — 82306 VITAMIN D 25 HYDROXY: CPT

## 2023-06-05 PROCEDURE — 82044 UR ALBUMIN SEMIQUANTITATIVE: CPT

## 2023-06-16 DIAGNOSIS — E78.2 MIXED HYPERLIPIDEMIA: ICD-10-CM

## 2023-06-19 RX ORDER — ROSUVASTATIN CALCIUM 10 MG/1
TABLET, COATED ORAL
Qty: 90 TABLET | Refills: 3 | OUTPATIENT
Start: 2023-06-19

## 2023-07-05 RX ORDER — CLOPIDOGREL BISULFATE 75 MG/1
75 TABLET ORAL DAILY
Qty: 90 TABLET | Refills: 3 | Status: SHIPPED | OUTPATIENT
Start: 2023-07-05

## 2023-07-05 RX ORDER — CLOPIDOGREL BISULFATE 75 MG/1
75 TABLET ORAL DAILY
Qty: 90 TABLET | Refills: 3 | OUTPATIENT
Start: 2023-07-05

## 2023-07-19 ENCOUNTER — TELEPHONE (OUTPATIENT)
Dept: FAMILY MEDICINE CLINIC | Age: 78
End: 2023-07-19

## 2023-07-19 DIAGNOSIS — R26.2 AMBULATORY DYSFUNCTION: Primary | ICD-10-CM

## 2023-07-19 DIAGNOSIS — Z76.0 MEDICATION REFILL: ICD-10-CM

## 2023-07-19 DIAGNOSIS — F41.9 ANXIETY: Primary | ICD-10-CM

## 2023-07-19 RX ORDER — ALPRAZOLAM 1 MG/1
1 TABLET ORAL 3 TIMES DAILY PRN
Qty: 90 TABLET | Refills: 0 | Status: SHIPPED | OUTPATIENT
Start: 2023-07-19 | End: 2023-08-18

## 2023-07-19 RX ORDER — LOSARTAN POTASSIUM 50 MG/1
50 TABLET ORAL DAILY
Qty: 90 TABLET | Refills: 1 | Status: SHIPPED | OUTPATIENT
Start: 2023-07-19

## 2023-07-19 NOTE — TELEPHONE ENCOUNTER
Pt is requesting Alprazolam 1 mg to [facility], and she also is requesting a new script for Handi cap sticker, her's is about to exspire soon, call when ready to

## 2023-09-05 ENCOUNTER — OFFICE VISIT (OUTPATIENT)
Dept: FAMILY MEDICINE CLINIC | Age: 78
End: 2023-09-05

## 2023-09-05 VITALS
SYSTOLIC BLOOD PRESSURE: 136 MMHG | TEMPERATURE: 98.1 F | BODY MASS INDEX: 26.49 KG/M2 | HEART RATE: 77 BPM | OXYGEN SATURATION: 98 % | HEIGHT: 65 IN | DIASTOLIC BLOOD PRESSURE: 75 MMHG | WEIGHT: 159 LBS

## 2023-09-05 DIAGNOSIS — I10 ESSENTIAL HYPERTENSION: Chronic | ICD-10-CM

## 2023-09-05 DIAGNOSIS — R20.2 PARESTHESIA: ICD-10-CM

## 2023-09-05 DIAGNOSIS — N18.31 STAGE 3A CHRONIC KIDNEY DISEASE (HCC): Chronic | ICD-10-CM

## 2023-09-05 DIAGNOSIS — Z00.00 INITIAL MEDICARE ANNUAL WELLNESS VISIT: Primary | ICD-10-CM

## 2023-09-05 DIAGNOSIS — E78.2 MIXED HYPERLIPIDEMIA: ICD-10-CM

## 2023-09-05 ASSESSMENT — PATIENT HEALTH QUESTIONNAIRE - PHQ9
SUM OF ALL RESPONSES TO PHQ9 QUESTIONS 1 & 2: 3
SUM OF ALL RESPONSES TO PHQ QUESTIONS 1-9: 4
3. TROUBLE FALLING OR STAYING ASLEEP: 1
7. TROUBLE CONCENTRATING ON THINGS, SUCH AS READING THE NEWSPAPER OR WATCHING TELEVISION: 0
SUM OF ALL RESPONSES TO PHQ QUESTIONS 1-9: 4
10. IF YOU CHECKED OFF ANY PROBLEMS, HOW DIFFICULT HAVE THESE PROBLEMS MADE IT FOR YOU TO DO YOUR WORK, TAKE CARE OF THINGS AT HOME, OR GET ALONG WITH OTHER PEOPLE: 1
8. MOVING OR SPEAKING SO SLOWLY THAT OTHER PEOPLE COULD HAVE NOTICED. OR THE OPPOSITE, BEING SO FIGETY OR RESTLESS THAT YOU HAVE BEEN MOVING AROUND A LOT MORE THAN USUAL: 0
2. FEELING DOWN, DEPRESSED OR HOPELESS: 1
1. LITTLE INTEREST OR PLEASURE IN DOING THINGS: 2
SUM OF ALL RESPONSES TO PHQ QUESTIONS 1-9: 4
5. POOR APPETITE OR OVEREATING: 0
6. FEELING BAD ABOUT YOURSELF - OR THAT YOU ARE A FAILURE OR HAVE LET YOURSELF OR YOUR FAMILY DOWN: 0
9. THOUGHTS THAT YOU WOULD BE BETTER OFF DEAD, OR OF HURTING YOURSELF: 0
SUM OF ALL RESPONSES TO PHQ QUESTIONS 1-9: 4
DEPRESSION UNABLE TO ASSESS: PT REFUSES
4. FEELING TIRED OR HAVING LITTLE ENERGY: 0

## 2023-09-05 ASSESSMENT — LIFESTYLE VARIABLES
HOW MANY STANDARD DRINKS CONTAINING ALCOHOL DO YOU HAVE ON A TYPICAL DAY: PATIENT DOES NOT DRINK
HOW OFTEN DO YOU HAVE A DRINK CONTAINING ALCOHOL: NEVER

## 2023-09-05 NOTE — PATIENT INSTRUCTIONS
not to use tobacco products. They can affect dental and general health. Many older adults have a fixed income and feel that they can't afford dental care. But most towns and cities have programs in which dentists help older adults by lowering fees. Contact your area's public health offices or  for information about dental care in your area. Using a toothbrush  Older adults with arthritis sometimes have trouble brushing their teeth because they can't easily hold the toothbrush. Their hands and fingers may be stiff, painful, or weak. If this is the case, you can: Offer an electric toothbrush. Enlarge the handle of a non-electric toothbrush by wrapping a sponge, an elastic bandage, or adhesive tape around it. Push the toothbrush handle through a ball made of rubber or soft foam.  Make the handle longer and thicker by taping Popsicle sticks or tongue depressors to it. You may also be able to buy special toothbrushes, toothpaste dispensers, and floss holders. Your doctor may recommend a soft-bristle toothbrush if the person you care for bleeds easily. Bleeding can happen because of a health problem or from certain medicines. A toothpaste for sensitive teeth may help if the person you care for has sensitive teeth. How do you brush and floss someone's teeth? If the person you are caring for has a hard time cleaning their teeth on their own, you may need to brush and floss their teeth for them. It may be easiest to have the person sit and face away from you, and to sit or stand behind them. That way you can steady their head against your arm as you reach around to floss and brush their teeth. Choose a place that has good lighting and is comfortable for both of you. Before you begin, gather your supplies. You will need gloves, floss, a toothbrush, and a container to hold water if you are not near a sink. Wash and dry your hands well and put on gloves.  Start by flossing:  Gently work a piece of floss

## 2023-10-09 DIAGNOSIS — I10 ESSENTIAL HYPERTENSION: Chronic | ICD-10-CM

## 2023-10-09 NOTE — TELEPHONE ENCOUNTER
Patient called for refill. She is nearly out.       [id number] - [address] [place] [address] AVE - P [phone] - F [phone]     amLODIPine (NORVASC) 5 MG tablet

## 2023-10-10 RX ORDER — AMLODIPINE BESYLATE 5 MG/1
5 TABLET ORAL DAILY
Qty: 90 TABLET | Refills: 1 | Status: SHIPPED | OUTPATIENT
Start: 2023-10-10

## 2023-10-16 DIAGNOSIS — F41.9 ANXIETY: ICD-10-CM

## 2023-10-17 RX ORDER — ALPRAZOLAM 1 MG/1
1 TABLET ORAL 3 TIMES DAILY PRN
Qty: 90 TABLET | Refills: 0 | Status: SHIPPED | OUTPATIENT
Start: 2023-10-17 | End: 2023-11-16

## 2023-12-05 ENCOUNTER — OFFICE VISIT (OUTPATIENT)
Dept: FAMILY MEDICINE CLINIC | Age: 78
End: 2023-12-05
Payer: MEDICARE

## 2023-12-05 VITALS
TEMPERATURE: 98.4 F | HEIGHT: 65 IN | WEIGHT: 151.8 LBS | OXYGEN SATURATION: 98 % | HEART RATE: 73 BPM | DIASTOLIC BLOOD PRESSURE: 71 MMHG | RESPIRATION RATE: 18 BRPM | SYSTOLIC BLOOD PRESSURE: 124 MMHG | BODY MASS INDEX: 25.29 KG/M2

## 2023-12-05 DIAGNOSIS — M51.37 DEGENERATION OF INTERVERTEBRAL DISC OF LUMBOSACRAL REGION: ICD-10-CM

## 2023-12-05 DIAGNOSIS — I10 ESSENTIAL HYPERTENSION: Chronic | ICD-10-CM

## 2023-12-05 DIAGNOSIS — N18.31 STAGE 3A CHRONIC KIDNEY DISEASE (HCC): Primary | Chronic | ICD-10-CM

## 2023-12-05 DIAGNOSIS — R61 NIGHT SWEAT: ICD-10-CM

## 2023-12-05 DIAGNOSIS — I51.89 DIASTOLIC DYSFUNCTION: ICD-10-CM

## 2023-12-05 PROBLEM — M51.379 DEGENERATION OF INTERVERTEBRAL DISC OF LUMBOSACRAL REGION: Status: ACTIVE | Noted: 2023-12-05

## 2023-12-05 PROCEDURE — 1090F PRES/ABSN URINE INCON ASSESS: CPT | Performed by: STUDENT IN AN ORGANIZED HEALTH CARE EDUCATION/TRAINING PROGRAM

## 2023-12-05 PROCEDURE — 99214 OFFICE O/P EST MOD 30 MIN: CPT | Performed by: STUDENT IN AN ORGANIZED HEALTH CARE EDUCATION/TRAINING PROGRAM

## 2023-12-05 PROCEDURE — G8400 PT W/DXA NO RESULTS DOC: HCPCS | Performed by: STUDENT IN AN ORGANIZED HEALTH CARE EDUCATION/TRAINING PROGRAM

## 2023-12-05 PROCEDURE — G8427 DOCREV CUR MEDS BY ELIG CLIN: HCPCS | Performed by: STUDENT IN AN ORGANIZED HEALTH CARE EDUCATION/TRAINING PROGRAM

## 2023-12-05 PROCEDURE — G0008 ADMIN INFLUENZA VIRUS VAC: HCPCS | Performed by: STUDENT IN AN ORGANIZED HEALTH CARE EDUCATION/TRAINING PROGRAM

## 2023-12-05 PROCEDURE — 3078F DIAST BP <80 MM HG: CPT | Performed by: STUDENT IN AN ORGANIZED HEALTH CARE EDUCATION/TRAINING PROGRAM

## 2023-12-05 PROCEDURE — 3074F SYST BP LT 130 MM HG: CPT | Performed by: STUDENT IN AN ORGANIZED HEALTH CARE EDUCATION/TRAINING PROGRAM

## 2023-12-05 PROCEDURE — 1123F ACP DISCUSS/DSCN MKR DOCD: CPT | Performed by: STUDENT IN AN ORGANIZED HEALTH CARE EDUCATION/TRAINING PROGRAM

## 2023-12-05 PROCEDURE — G8417 CALC BMI ABV UP PARAM F/U: HCPCS | Performed by: STUDENT IN AN ORGANIZED HEALTH CARE EDUCATION/TRAINING PROGRAM

## 2023-12-05 PROCEDURE — G8484 FLU IMMUNIZE NO ADMIN: HCPCS | Performed by: STUDENT IN AN ORGANIZED HEALTH CARE EDUCATION/TRAINING PROGRAM

## 2023-12-05 PROCEDURE — 90694 VACC AIIV4 NO PRSRV 0.5ML IM: CPT | Performed by: STUDENT IN AN ORGANIZED HEALTH CARE EDUCATION/TRAINING PROGRAM

## 2023-12-05 PROCEDURE — 1036F TOBACCO NON-USER: CPT | Performed by: STUDENT IN AN ORGANIZED HEALTH CARE EDUCATION/TRAINING PROGRAM

## 2023-12-05 RX ORDER — ALPRAZOLAM 1 MG/1
1 TABLET ORAL 3 TIMES DAILY PRN
COMMUNITY

## 2023-12-06 RX ORDER — LOSARTAN POTASSIUM 50 MG/1
50 TABLET ORAL DAILY
Qty: 90 TABLET | Refills: 3 | Status: SHIPPED | OUTPATIENT
Start: 2023-12-06

## 2023-12-06 NOTE — TELEPHONE ENCOUNTER
Last Appointment:  12/5/2023  Future Appointments   Date Time Provider [address]   4/4/2024  2:30 PM Kailey SOMERS [facility]   9/10/2024  2:30 PM DO Ashley Salgado [facility]

## 2024-01-03 DIAGNOSIS — F41.9 ANXIETY: Primary | ICD-10-CM

## 2024-01-03 RX ORDER — ALPRAZOLAM 1 MG/1
1 TABLET ORAL 3 TIMES DAILY PRN
Qty: 90 TABLET | Refills: 0 | Status: SHIPPED | OUTPATIENT
Start: 2024-01-03 | End: 2024-02-02

## 2024-02-05 ENCOUNTER — HOSPITAL ENCOUNTER (OUTPATIENT)
Age: 79
Discharge: HOME | End: 2024-02-05
Payer: MEDICARE

## 2024-02-05 LAB
25(OH)D3 SERPL-MCNC: 52.8 NG/ML (ref 30–100)
ALBUMIN SERPL-MCNC: 4.2 G/DL (ref 3.5–5.2)
ALP SERPL-CCNC: 115 U/L (ref 35–104)
ALT SERPL-CCNC: 7 U/L (ref 0–32)
ANION GAP SERPL CALCULATED.3IONS-SCNC: 8 MMOL/L (ref 7–16)
AST SERPL-CCNC: 12 U/L (ref 0–31)
BASOPHILS # BLD: 0.02 K/UL (ref 0–0.2)
BASOPHILS NFR BLD: 0 % (ref 0–2)
BILIRUB SERPL-MCNC: 0.5 MG/DL (ref 0–1.2)
BUN SERPL-MCNC: 16 MG/DL (ref 6–23)
CALCIUM SERPL-MCNC: 9 MG/DL (ref 8.6–10.2)
CHLORIDE SERPL-SCNC: 107 MMOL/L (ref 98–107)
CHOLEST SERPL-MCNC: 149 MG/DL
CO2 SERPL-SCNC: 28 MMOL/L (ref 22–29)
CREAT SERPL-MCNC: 1.2 MG/DL (ref 0.5–1)
CREAT UR-MCNC: 348.1 MG/DL (ref 29–226)
EOSINOPHIL # BLD: 0.12 K/UL (ref 0.05–0.5)
EOSINOPHILS RELATIVE PERCENT: 3 % (ref 0–6)
ERYTHROCYTE [DISTWIDTH] IN BLOOD BY AUTOMATED COUNT: 14.8 % (ref 11.5–15)
GFR SERPL CREATININE-BSD FRML MDRD: 46 ML/MIN/1.73M2
GLUCOSE SERPL-MCNC: 103 MG/DL (ref 74–99)
HBA1C MFR BLD: 5.7 % (ref 4–5.6)
HCT VFR BLD AUTO: 37.9 % (ref 34–48)
HDLC SERPL-MCNC: 57 MG/DL
HGB BLD-MCNC: 12.1 G/DL (ref 11.5–15.5)
IMM GRANULOCYTES # BLD AUTO: 0.03 K/UL (ref 0–0.58)
IMM GRANULOCYTES NFR BLD: 1 % (ref 0–5)
LDLC SERPL CALC-MCNC: 78 MG/DL
LYMPHOCYTES NFR BLD: 1.96 K/UL (ref 1.5–4)
LYMPHOCYTES RELATIVE PERCENT: 44 % (ref 20–42)
MCH RBC QN AUTO: 25.3 PG (ref 26–35)
MCHC RBC AUTO-ENTMCNC: 31.9 G/DL (ref 32–34.5)
MCV RBC AUTO: 79.1 FL (ref 80–99.9)
MICROALBUMIN UR-MCNC: 22 MG/L (ref 0–19)
MICROALBUMIN/CREAT UR-RTO: 6 MCG/MG CREAT (ref 0–30)
MONOCYTES NFR BLD: 0.31 K/UL (ref 0.1–0.95)
MONOCYTES NFR BLD: 7 % (ref 2–12)
NEUTROPHILS NFR BLD: 46 % (ref 43–80)
NEUTS SEG NFR BLD: 2.03 K/UL (ref 1.8–7.3)
PHOSPHATE SERPL-MCNC: 3.4 MG/DL (ref 2.5–4.5)
PLATELET # BLD AUTO: 136 K/UL (ref 130–450)
PLATELET CONFIRMATION: NORMAL
PMV BLD AUTO: 9.8 FL (ref 7–12)
POTASSIUM SERPL-SCNC: 4.1 MMOL/L (ref 3.5–5)
PROT SERPL-MCNC: 6.9 G/DL (ref 6.4–8.3)
PTH-INTACT SERPL-MCNC: 70.2 PG/ML (ref 15–65)
RBC # BLD AUTO: 4.79 M/UL (ref 3.5–5.5)
SODIUM SERPL-SCNC: 143 MMOL/L (ref 132–146)
TRIGL SERPL-MCNC: 70 MG/DL
VLDLC SERPL CALC-MCNC: 14 MG/DL
WBC OTHER # BLD: 4.5 K/UL (ref 4.5–11.5)

## 2024-02-05 PROCEDURE — 82043 UR ALBUMIN QUANTITATIVE: CPT

## 2024-02-05 PROCEDURE — 82306 VITAMIN D 25 HYDROXY: CPT

## 2024-02-05 PROCEDURE — 80061 LIPID PANEL: CPT

## 2024-02-05 PROCEDURE — 83036 HEMOGLOBIN GLYCOSYLATED A1C: CPT

## 2024-02-05 PROCEDURE — 36415 COLL VENOUS BLD VENIPUNCTURE: CPT

## 2024-02-05 PROCEDURE — 82570 ASSAY OF URINE CREATININE: CPT

## 2024-02-05 PROCEDURE — 85025 COMPLETE CBC W/AUTO DIFF WBC: CPT

## 2024-02-05 PROCEDURE — 84100 ASSAY OF PHOSPHORUS: CPT

## 2024-02-05 PROCEDURE — 80053 COMPREHEN METABOLIC PANEL: CPT

## 2024-02-05 PROCEDURE — 83970 ASSAY OF PARATHORMONE: CPT

## 2024-03-11 DIAGNOSIS — Z76.0 MEDICATION REFILL: ICD-10-CM

## 2024-03-12 RX ORDER — ALPRAZOLAM 1 MG/1
1 TABLET ORAL 3 TIMES DAILY PRN
Qty: 90 TABLET | Refills: 0 | Status: SHIPPED | OUTPATIENT
Start: 2024-03-12 | End: 2024-06-10

## 2024-03-26 DIAGNOSIS — E78.2 MIXED HYPERLIPIDEMIA: ICD-10-CM

## 2024-03-27 DIAGNOSIS — I10 ESSENTIAL HYPERTENSION: Chronic | ICD-10-CM

## 2024-03-27 RX ORDER — AMLODIPINE BESYLATE 5 MG/1
5 TABLET ORAL DAILY
Qty: 90 TABLET | Refills: 1 | Status: SHIPPED | OUTPATIENT
Start: 2024-03-27

## 2024-03-27 RX ORDER — ROSUVASTATIN CALCIUM 10 MG/1
TABLET, COATED ORAL
Qty: 90 TABLET | Refills: 3 | Status: SHIPPED | OUTPATIENT
Start: 2024-03-27

## 2024-04-04 ENCOUNTER — OFFICE VISIT (OUTPATIENT)
Dept: FAMILY MEDICINE CLINIC | Age: 79
End: 2024-04-04

## 2024-04-04 ENCOUNTER — TELEPHONE (OUTPATIENT)
Dept: FAMILY MEDICINE CLINIC | Age: 79
End: 2024-04-04

## 2024-04-04 VITALS
RESPIRATION RATE: 18 BRPM | TEMPERATURE: 97.8 F | HEIGHT: 65 IN | DIASTOLIC BLOOD PRESSURE: 75 MMHG | OXYGEN SATURATION: 99 % | HEART RATE: 72 BPM | SYSTOLIC BLOOD PRESSURE: 123 MMHG | WEIGHT: 160.8 LBS | BODY MASS INDEX: 26.79 KG/M2

## 2024-04-04 DIAGNOSIS — I10 ESSENTIAL HYPERTENSION: Chronic | ICD-10-CM

## 2024-04-04 DIAGNOSIS — H91.91 DECREASED HEARING, RIGHT: Primary | ICD-10-CM

## 2024-04-04 DIAGNOSIS — I51.89 DIASTOLIC DYSFUNCTION: ICD-10-CM

## 2024-04-04 DIAGNOSIS — E78.2 MIXED HYPERLIPIDEMIA: Chronic | ICD-10-CM

## 2024-04-04 DIAGNOSIS — I34.0 MITRAL VALVE INSUFFICIENCY, UNSPECIFIED ETIOLOGY: ICD-10-CM

## 2024-04-04 DIAGNOSIS — Z12.31 BREAST CANCER SCREENING BY MAMMOGRAM: Primary | ICD-10-CM

## 2024-04-04 DIAGNOSIS — Z13.820 ENCOUNTER FOR OSTEOPOROSIS SCREENING IN ASYMPTOMATIC POSTMENOPAUSAL PATIENT: ICD-10-CM

## 2024-04-04 DIAGNOSIS — N18.31 STAGE 3A CHRONIC KIDNEY DISEASE (HCC): Chronic | ICD-10-CM

## 2024-04-04 DIAGNOSIS — Z78.0 ENCOUNTER FOR OSTEOPOROSIS SCREENING IN ASYMPTOMATIC POSTMENOPAUSAL PATIENT: ICD-10-CM

## 2024-04-04 DIAGNOSIS — D69.6 PLATELETS DECREASED (HCC): ICD-10-CM

## 2024-04-04 SDOH — ECONOMIC STABILITY: FOOD INSECURITY: WITHIN THE PAST 12 MONTHS, YOU WORRIED THAT YOUR FOOD WOULD RUN OUT BEFORE YOU GOT MONEY TO BUY MORE.: NEVER TRUE

## 2024-04-04 SDOH — ECONOMIC STABILITY: FOOD INSECURITY: WITHIN THE PAST 12 MONTHS, THE FOOD YOU BOUGHT JUST DIDN'T LAST AND YOU DIDN'T HAVE MONEY TO GET MORE.: NEVER TRUE

## 2024-04-04 SDOH — ECONOMIC STABILITY: INCOME INSECURITY: HOW HARD IS IT FOR YOU TO PAY FOR THE VERY BASICS LIKE FOOD, HOUSING, MEDICAL CARE, AND HEATING?: NOT VERY HARD

## 2024-04-04 ASSESSMENT — PATIENT HEALTH QUESTIONNAIRE - PHQ9
SUM OF ALL RESPONSES TO PHQ QUESTIONS 1-9: 0
SUM OF ALL RESPONSES TO PHQ9 QUESTIONS 1 & 2: 0
SUM OF ALL RESPONSES TO PHQ QUESTIONS 1-9: 0
1. LITTLE INTEREST OR PLEASURE IN DOING THINGS: NOT AT ALL
SUM OF ALL RESPONSES TO PHQ QUESTIONS 1-9: 0
2. FEELING DOWN, DEPRESSED OR HOPELESS: NOT AT ALL
SUM OF ALL RESPONSES TO PHQ QUESTIONS 1-9: 0

## 2024-04-04 NOTE — PROGRESS NOTES
Patient:  Amy Arizmendi 78 y.o. female     04/04/24      Chiefcomplaint:   Chief Complaint   Patient presents with    4 month follow up    Hearing Problem     Hearing issues in right ear     Problems and Overview     Hearing decreased on right for 6 mo. No pain. No tinnitus. Maybe some congestion. Not very significant though.    Wt Readings from Last 3 Encounters:   04/04/24 72.9 kg (160 lb 12.8 oz)   12/05/23 68.9 kg (151 lb 12.8 oz)   09/05/23 72.1 kg (159 lb)     HTN  On losartan and amlodipine  BP Readings from Last 3 Encounters:   04/04/24 123/75   12/05/23 124/71   09/05/23 136/75     Mild reduction platelets for years  Lab Results   Component Value Date     02/05/2024     CKD 3 - gfr 48   Lab Results   Component Value Date    CREATININE 1.2 (H) 02/05/2024     Retinal detachment x 2 - follows specialist - using eye drops - vision is affected in that eye    HLD - lovastatin - ct coronary - mild plaque  Lab Results   Component Value Date    CHOL 149 02/05/2024    TRIG 70 02/05/2024    HDL 57 02/05/2024    LDLCHOLESTEROL 78 02/05/2024    LDLCALC 74 06/05/2023    VLDL 14 02/05/2024     Hx stroke -went to someone in the saw evidence that maybe she had a stroke when looking in her eye.  She is not aware of any obvious stroke and never went to the emergency department for stroke.  She did have an episode where her balance was off somewhat recently and she fell in the shower.     Lab Results   Component Value Date    LDLCALC 74 06/05/2023    LDLCHOLESTEROL 78 02/05/2024     Impression   · The LAD has small calcified plaque with 10% stenosis in the proximal segment       · Left circumflex artery has calcified plaque with less than 10% stenosis in   the proximal segment       · Ramus intermedius has a calcified plaque with 20 to 30% stenosis in the   proximal segment       · The RCA has a calcified plaque in the proximal segment with 10% stenosis.       · Normal LV size and function, LVEF 75%       · Total

## 2024-04-24 ENCOUNTER — HOSPITAL ENCOUNTER (OUTPATIENT)
Dept: GENERAL RADIOLOGY | Age: 79
Discharge: HOME | End: 2024-04-26
Payer: MEDICARE

## 2024-04-24 VITALS — BODY MASS INDEX: 26.66 KG/M2 | HEIGHT: 65 IN | WEIGHT: 160 LBS

## 2024-04-24 DIAGNOSIS — Z12.31 BREAST CANCER SCREENING BY MAMMOGRAM: ICD-10-CM

## 2024-04-24 PROCEDURE — 77063 BREAST TOMOSYNTHESIS BI: CPT

## 2024-05-14 ENCOUNTER — TELEPHONE (OUTPATIENT)
Dept: FAMILY MEDICINE CLINIC | Age: 79
End: 2024-05-14

## 2024-05-14 DIAGNOSIS — Z76.0 MEDICATION REFILL: ICD-10-CM

## 2024-05-14 RX ORDER — CLOPIDOGREL BISULFATE 75 MG/1
75 TABLET ORAL DAILY
Qty: 90 TABLET | Refills: 3 | Status: SHIPPED | OUTPATIENT
Start: 2024-05-14

## 2024-05-15 RX ORDER — ALPRAZOLAM 1 MG/1
1 TABLET ORAL 3 TIMES DAILY PRN
Qty: 90 TABLET | Refills: 0 | Status: SHIPPED | OUTPATIENT
Start: 2024-05-15 | End: 2024-06-14

## 2024-05-16 DIAGNOSIS — Z76.0 MEDICATION REFILL: ICD-10-CM

## 2024-05-16 NOTE — TELEPHONE ENCOUNTER
Other (med)  (Newest Message First)  View All Conversations on this Encounter  Francine Romo routed conversation to [facility] Clinical Staff1 hour ago (3:02 PM)     Francine Romo1 hour ago (3:02 PM)     CO  This was to the pharmacy yesterday and it was not confirmed.  Can this be resent again please?         Note         Hazel Madrigal routed conversation to Layo Ha DO2 days ago     Rebecca Connors routed conversation to [facility] Clinical Staff2 days ago     Rebecca Connors L2 days ago     CR  Please send Alprazolam to [facility] in [place]         Note

## 2024-05-20 RX ORDER — ALPRAZOLAM 1 MG/1
1 TABLET ORAL 3 TIMES DAILY PRN
Qty: 90 TABLET | Refills: 0 | Status: SHIPPED | OUTPATIENT
Start: 2024-05-20 | End: 2024-06-19

## 2024-06-05 LAB
ESTIMATED AVERAGE GLUCOSE: NORMAL
HBA1C MFR BLD: 5.5 %

## 2024-07-08 DIAGNOSIS — F41.9 ANXIETY: ICD-10-CM

## 2024-07-09 RX ORDER — ALPRAZOLAM 1 MG/1
1 TABLET ORAL 3 TIMES DAILY PRN
Qty: 90 TABLET | Refills: 0 | Status: SHIPPED | OUTPATIENT
Start: 2024-07-09 | End: 2024-08-08

## 2024-08-07 ENCOUNTER — OFFICE VISIT (OUTPATIENT)
Dept: FAMILY MEDICINE CLINIC | Age: 79
End: 2024-08-07
Payer: MEDICARE

## 2024-08-07 VITALS
BODY MASS INDEX: 27.66 KG/M2 | OXYGEN SATURATION: 98 % | HEIGHT: 65 IN | SYSTOLIC BLOOD PRESSURE: 132 MMHG | DIASTOLIC BLOOD PRESSURE: 74 MMHG | TEMPERATURE: 98.9 F | WEIGHT: 166 LBS | RESPIRATION RATE: 18 BRPM | HEART RATE: 70 BPM

## 2024-08-07 DIAGNOSIS — Z78.0 ENCOUNTER FOR OSTEOPOROSIS SCREENING IN ASYMPTOMATIC POSTMENOPAUSAL PATIENT: ICD-10-CM

## 2024-08-07 DIAGNOSIS — N18.31 STAGE 3A CHRONIC KIDNEY DISEASE (HCC): Chronic | ICD-10-CM

## 2024-08-07 DIAGNOSIS — E78.2 MIXED HYPERLIPIDEMIA: Chronic | ICD-10-CM

## 2024-08-07 DIAGNOSIS — M54.2 NECK PAIN: ICD-10-CM

## 2024-08-07 DIAGNOSIS — Z13.820 ENCOUNTER FOR OSTEOPOROSIS SCREENING IN ASYMPTOMATIC POSTMENOPAUSAL PATIENT: ICD-10-CM

## 2024-08-07 DIAGNOSIS — I10 ESSENTIAL HYPERTENSION: Chronic | ICD-10-CM

## 2024-08-07 DIAGNOSIS — Z00.00 MEDICARE ANNUAL WELLNESS VISIT, SUBSEQUENT: Primary | ICD-10-CM

## 2024-08-07 DIAGNOSIS — F41.9 ANXIETY: Chronic | ICD-10-CM

## 2024-08-07 PROCEDURE — G8400 PT W/DXA NO RESULTS DOC: HCPCS | Performed by: STUDENT IN AN ORGANIZED HEALTH CARE EDUCATION/TRAINING PROGRAM

## 2024-08-07 PROCEDURE — 1090F PRES/ABSN URINE INCON ASSESS: CPT | Performed by: STUDENT IN AN ORGANIZED HEALTH CARE EDUCATION/TRAINING PROGRAM

## 2024-08-07 PROCEDURE — 99214 OFFICE O/P EST MOD 30 MIN: CPT | Performed by: STUDENT IN AN ORGANIZED HEALTH CARE EDUCATION/TRAINING PROGRAM

## 2024-08-07 PROCEDURE — 3078F DIAST BP <80 MM HG: CPT | Performed by: STUDENT IN AN ORGANIZED HEALTH CARE EDUCATION/TRAINING PROGRAM

## 2024-08-07 PROCEDURE — G0439 PPPS, SUBSEQ VISIT: HCPCS | Performed by: STUDENT IN AN ORGANIZED HEALTH CARE EDUCATION/TRAINING PROGRAM

## 2024-08-07 PROCEDURE — 1036F TOBACCO NON-USER: CPT | Performed by: STUDENT IN AN ORGANIZED HEALTH CARE EDUCATION/TRAINING PROGRAM

## 2024-08-07 PROCEDURE — 1123F ACP DISCUSS/DSCN MKR DOCD: CPT | Performed by: STUDENT IN AN ORGANIZED HEALTH CARE EDUCATION/TRAINING PROGRAM

## 2024-08-07 PROCEDURE — 3075F SYST BP GE 130 - 139MM HG: CPT | Performed by: STUDENT IN AN ORGANIZED HEALTH CARE EDUCATION/TRAINING PROGRAM

## 2024-08-07 PROCEDURE — G8427 DOCREV CUR MEDS BY ELIG CLIN: HCPCS | Performed by: STUDENT IN AN ORGANIZED HEALTH CARE EDUCATION/TRAINING PROGRAM

## 2024-08-07 PROCEDURE — G8417 CALC BMI ABV UP PARAM F/U: HCPCS | Performed by: STUDENT IN AN ORGANIZED HEALTH CARE EDUCATION/TRAINING PROGRAM

## 2024-08-07 ASSESSMENT — PATIENT HEALTH QUESTIONNAIRE - PHQ9
SUM OF ALL RESPONSES TO PHQ QUESTIONS 1-9: 1
SUM OF ALL RESPONSES TO PHQ QUESTIONS 1-9: 1
2. FEELING DOWN, DEPRESSED OR HOPELESS: NOT AT ALL
1. LITTLE INTEREST OR PLEASURE IN DOING THINGS: SEVERAL DAYS
SUM OF ALL RESPONSES TO PHQ9 QUESTIONS 1 & 2: 1
SUM OF ALL RESPONSES TO PHQ QUESTIONS 1-9: 1
SUM OF ALL RESPONSES TO PHQ QUESTIONS 1-9: 1

## 2024-08-07 ASSESSMENT — LIFESTYLE VARIABLES
HOW OFTEN DO YOU HAVE A DRINK CONTAINING ALCOHOL: NEVER
HOW MANY STANDARD DRINKS CONTAINING ALCOHOL DO YOU HAVE ON A TYPICAL DAY: PATIENT DOES NOT DRINK

## 2024-08-07 NOTE — PROGRESS NOTES
Anxiety worse due to family health issues  Would like to discuss with therapist  For xanax she takes up to 1.5 pills daily    Neck pain -  muscle spasm on exam - recommend heat and stretching    Had grecia normal and normal A1c by home testing    Htn on amlodipine, losartan   BP Readings from Last 3 Encounters:   08/07/24 132/74   04/04/24 123/75   12/05/23 124/71     Hld on crestor  Lab Results   Component Value Date    CHOL 149 02/05/2024    TRIG 70 02/05/2024    HDL 57 02/05/2024    LDL 78 02/05/2024    VLDL 14 02/05/2024     Ckd   Lab Results   Component Value Date    CREATININE 1.2 (H) 02/05/2024    CREATININE 1.2 (H) 06/05/2023    CREATININE 1.2 (H) 04/11/2023

## 2024-08-07 NOTE — PROGRESS NOTES
Medicare Annual Wellness Visit    Amy Arizmendi is here for Medicare AWV and Other (Patient having a hard time-- brother is passing away-- having some anxiety /Wants to see a therapist.. )    Assessment & Plan   Medicare annual wellness visit, subsequent  Essential hypertension  Stage 3a chronic kidney disease (HCC)  Breast cancer screening by mammogram  Encounter for osteoporosis screening in asymptomatic postmenopausal patient  -     DEXA BONE DENSITY AXIAL SKELETON; Future    Recommendations for Preventive Services Due: see orders and patient instructions/AVS.  Recommended screening schedule for the next 5-10 years is provided to the patient in written form: see Patient Instructions/AVS.     Return in 3 months (on 11/7/2024) for Medicare Annual Wellness Visit in 1 year.     Subjective       Patient's complete Health Risk Assessment and screening values have been reviewed and are found in Flowsheets. The following problems were reviewed today and where indicated follow up appointments were made and/or referrals ordered.    No Positive Risk Factors identified today.        Fall Risk:  Do you feel unsteady or are you worried about falling? : (!) yes  2 or more falls in past year?: no  Fall with injury in past year?: no            Controlled Medication Review:    Today's Pain Level: No data recorded   Opioid Risk: (Low risk score <55) Opioid risk score: 14    Patient is low risk for opioid use disorder or overdose.    Last PDMP [place] as Reviewed:  Review User Review Instant Review Result   GILES CAMARILLO 1/3/2024  4:12 PM     Reviewed PDMP [1]     Last Controlled Substance Monitoring Documentation      Flowsheet Row Office Visit from 8/7/2024 in [facility]   Periodic Controlled Substance Monitoring No signs of potential drug abuse or diversion identified., Obtaining appropriate analgesic effect of treatment. filed at 08/07/2024 9009             Inactivity:  On average, how many

## 2024-08-12 ENCOUNTER — HOSPITAL ENCOUNTER (OUTPATIENT)
Age: 79
Discharge: HOME | End: 2024-08-12
Payer: MEDICARE

## 2024-08-12 LAB
25(OH)D3 SERPL-MCNC: 48.5 NG/ML (ref 30–100)
ALBUMIN SERPL-MCNC: 4.2 G/DL (ref 3.5–5.2)
ALP SERPL-CCNC: 119 U/L (ref 35–104)
ALT SERPL-CCNC: 7 U/L (ref 0–32)
ANION GAP SERPL CALCULATED.3IONS-SCNC: 9 MMOL/L (ref 7–16)
AST SERPL-CCNC: 14 U/L (ref 0–31)
BASOPHILS # BLD: 0.03 K/UL (ref 0–0.2)
BASOPHILS NFR BLD: 1 % (ref 0–2)
BILIRUB SERPL-MCNC: 0.4 MG/DL (ref 0–1.2)
BUN SERPL-MCNC: 20 MG/DL (ref 6–23)
CALCIUM SERPL-MCNC: 9 MG/DL (ref 8.6–10.2)
CHLORIDE SERPL-SCNC: 103 MMOL/L (ref 98–107)
CHOLEST SERPL-MCNC: 157 MG/DL
CO2 SERPL-SCNC: 28 MMOL/L (ref 22–29)
CREAT SERPL-MCNC: 1.4 MG/DL (ref 0.5–1)
EOSINOPHIL # BLD: 0.1 K/UL (ref 0.05–0.5)
EOSINOPHILS RELATIVE PERCENT: 2 % (ref 0–6)
ERYTHROCYTE [DISTWIDTH] IN BLOOD BY AUTOMATED COUNT: 14.2 % (ref 11.5–15)
GFR, ESTIMATED: 40 ML/MIN/1.73M2
GLUCOSE SERPL-MCNC: 103 MG/DL (ref 74–99)
HBA1C MFR BLD: 6.1 % (ref 4–5.6)
HCT VFR BLD AUTO: 39.6 % (ref 34–48)
HDLC SERPL-MCNC: 46 MG/DL
HGB BLD-MCNC: 12.4 G/DL (ref 11.5–15.5)
IMM GRANULOCYTES # BLD AUTO: <0.03 K/UL (ref 0–0.58)
IMM GRANULOCYTES NFR BLD: 0 % (ref 0–5)
LDLC SERPL CALC-MCNC: 91 MG/DL
LYMPHOCYTES NFR BLD: 1.85 K/UL (ref 1.5–4)
LYMPHOCYTES RELATIVE PERCENT: 44 % (ref 20–42)
MCH RBC QN AUTO: 24.7 PG (ref 26–35)
MCHC RBC AUTO-ENTMCNC: 31.3 G/DL (ref 32–34.5)
MCV RBC AUTO: 78.9 FL (ref 80–99.9)
MONOCYTES NFR BLD: 0.36 K/UL (ref 0.1–0.95)
MONOCYTES NFR BLD: 9 % (ref 2–12)
NEUTROPHILS NFR BLD: 44 % (ref 43–80)
NEUTS SEG NFR BLD: 1.83 K/UL (ref 1.8–7.3)
PHOSPHATE SERPL-MCNC: 3.7 MG/DL (ref 2.5–4.5)
PLATELET # BLD AUTO: 169 K/UL (ref 130–450)
PMV BLD AUTO: 9.8 FL (ref 7–12)
POTASSIUM SERPL-SCNC: 4.5 MMOL/L (ref 3.5–5)
PROT SERPL-MCNC: 6.8 G/DL (ref 6.4–8.3)
PTH-INTACT SERPL-MCNC: 74.6 PG/ML (ref 15–65)
RBC # BLD AUTO: 5.02 M/UL (ref 3.5–5.5)
SODIUM SERPL-SCNC: 140 MMOL/L (ref 132–146)
TRIGL SERPL-MCNC: 102 MG/DL
VLDLC SERPL CALC-MCNC: 20 MG/DL
WBC OTHER # BLD: 4.2 K/UL (ref 4.5–11.5)

## 2024-08-12 PROCEDURE — 82306 VITAMIN D 25 HYDROXY: CPT

## 2024-08-12 PROCEDURE — 85025 COMPLETE CBC W/AUTO DIFF WBC: CPT

## 2024-08-12 PROCEDURE — 84100 ASSAY OF PHOSPHORUS: CPT

## 2024-08-12 PROCEDURE — 83036 HEMOGLOBIN GLYCOSYLATED A1C: CPT

## 2024-08-12 PROCEDURE — 80061 LIPID PANEL: CPT

## 2024-08-12 PROCEDURE — 80053 COMPREHEN METABOLIC PANEL: CPT

## 2024-08-12 PROCEDURE — 83970 ASSAY OF PARATHORMONE: CPT

## 2024-08-12 PROCEDURE — 36415 COLL VENOUS BLD VENIPUNCTURE: CPT

## 2024-08-14 RX ORDER — LOSARTAN POTASSIUM 50 MG/1
50 TABLET ORAL DAILY
Qty: 100 TABLET | Refills: 2 | Status: SHIPPED | OUTPATIENT
Start: 2024-08-14

## 2024-08-14 NOTE — TELEPHONE ENCOUNTER
Last Appointment:  8/7/2024  Future Appointments   Date Time Provider Department Center   8/26/2024  1:15 PM [facility]   10/15/2024  1:30 PM Layo Ha DO [facility] DEP

## 2024-08-26 ENCOUNTER — OFFICE VISIT (OUTPATIENT)
Dept: ENT CLINIC | Age: 79
End: 2024-08-26
Payer: MEDICARE

## 2024-08-26 ENCOUNTER — PROCEDURE VISIT (OUTPATIENT)
Dept: AUDIOLOGY | Age: 79
End: 2024-08-26
Payer: MEDICARE

## 2024-08-26 VITALS — WEIGHT: 163 LBS | BODY MASS INDEX: 27.16 KG/M2 | HEIGHT: 65 IN

## 2024-08-26 DIAGNOSIS — H93.11 TINNITUS, RIGHT EAR: ICD-10-CM

## 2024-08-26 DIAGNOSIS — H90.3 SENSORINEURAL HEARING LOSS (SNHL) OF BOTH EARS: Primary | ICD-10-CM

## 2024-08-26 PROCEDURE — 1123F ACP DISCUSS/DSCN MKR DOCD: CPT | Performed by: OTOLARYNGOLOGY

## 2024-08-26 PROCEDURE — G8417 CALC BMI ABV UP PARAM F/U: HCPCS | Performed by: OTOLARYNGOLOGY

## 2024-08-26 PROCEDURE — 99203 OFFICE O/P NEW LOW 30 MIN: CPT | Performed by: OTOLARYNGOLOGY

## 2024-08-26 PROCEDURE — 92567 TYMPANOMETRY: CPT

## 2024-08-26 PROCEDURE — G8427 DOCREV CUR MEDS BY ELIG CLIN: HCPCS | Performed by: OTOLARYNGOLOGY

## 2024-08-26 PROCEDURE — 1090F PRES/ABSN URINE INCON ASSESS: CPT | Performed by: OTOLARYNGOLOGY

## 2024-08-26 PROCEDURE — 1036F TOBACCO NON-USER: CPT | Performed by: OTOLARYNGOLOGY

## 2024-08-26 PROCEDURE — G8400 PT W/DXA NO RESULTS DOC: HCPCS | Performed by: OTOLARYNGOLOGY

## 2024-08-26 PROCEDURE — 92557 COMPREHENSIVE HEARING TEST: CPT

## 2024-08-26 ASSESSMENT — ENCOUNTER SYMPTOMS
SHORTNESS OF BREATH: 0
TROUBLE SWALLOWING: 0
VOMITING: 0
COUGH: 0
RHINORRHEA: 0

## 2024-08-26 NOTE — PROGRESS NOTES
This patient was referred for audiometric and tympanometric testing by Dr. White due to hearing loss. Patient stated that she has a difficult time hearing and understanding people and has been having to ask people to repeat themselves. Patient reported tinnitus of the right ear.     Audiometry using pure tone air and bone conduction testing revealed a slight to severe sensorineural hearing loss, bilaterally. Reliability was good. Speech reception thresholds were in good agreement with the pure tone averages, bilaterally. Speech discrimination scores were excellent, bilaterally.    Tympanometry revealed normal middle ear peak pressure and compliance, bilaterally.    The results were reviewed with the patient and ordering provider.     Recommendations for follow up will be made pending ordering provider consult.    Antonio Flores/CCC-A  [place] Lic [id number]  Electronically signed by Antonio Flores on 8/26/2024 at 4:09 PM

## 2024-08-26 NOTE — PROGRESS NOTES
[facility]  URIEL SamaniegoO. Ms.Ed.  New Consult       Patient Name:  Amy Arizmendi  :  1945     CHIEF C/O:    Chief Complaint   Patient presents with    New Patient     Patient presents for concerns for hearing in the right ear, reports that she is having people repeat themselves. States that she noticed this a few months ago. No history of noise exposure, or head injury.        HISTORY OBTAINED FROM:  patient    HISTORY OF PRESENT ILLNESS:       Amy is a 78 y.o. year old female, here today for:       Noticingincreased hearing loss with roends and family         Past Medical History:   Diagnosis Date    Chronic diastolic congestive heart failure (HCC) 2023    Chronic renal disease, stage II 2012    Hyperlipidemia 2022    Hypertension     Rhabdomyolysis 1/15/2013     Past Surgical History:   Procedure Laterality Date    BACK SURGERY      laminectomy  brocker    BREAST CYST EXCISION       SECTION      X2    EYE SURGERY      left eye-4 or 5 surgeries, detatched retina x2, has a buckle in her left eye    HYSTERECTOMY (CERVIX STATUS UNKNOWN)      KNEE SURGERY      left knee    ROTATOR CUFF REPAIR       right shoulder    TUBAL LIGATION         Current Outpatient Medications:     losartan (COZAAR) 50 MG tablet, TAKE 1 TABLET BY MOUTH DAILY, Disp: 100 tablet, Rfl: 2    clopidogrel (PLAVIX) 75 MG tablet, TAKE 1 TABLET BY MOUTH DAILY, Disp: 90 tablet, Rfl: 3    rosuvastatin (CRESTOR) 10 MG tablet, TAKE 1 TABLET BY MOUTH AT NIGHT, Disp: 90 tablet, Rfl: 3    amLODIPine (NORVASC) 5 MG tablet, Take 1 tablet by mouth daily, Disp: 90 tablet, Rfl: 1    Handicap Placard MISC, by Does not apply route Patient cannot walk 200 ft without stopping to rest.   Expiration 2028, Disp: 1 each, Rfl: 0    brimonidine (ALPHAGAN) 0.2 % ophthalmic solution, INSTILL 1 DROP INTO EACH EYE TWICE DAILY, Disp: , Rfl:     senna (SENOKOT) 8.6 MG tablet, Take 1 tablet by mouth daily,

## 2024-09-04 DIAGNOSIS — F41.9 ANXIETY: ICD-10-CM

## 2024-09-04 RX ORDER — ALPRAZOLAM 1 MG
1 TABLET ORAL 3 TIMES DAILY PRN
Qty: 90 TABLET | Refills: 0 | Status: SHIPPED | OUTPATIENT
Start: 2024-09-04 | End: 2024-10-04

## 2024-09-04 NOTE — TELEPHONE ENCOUNTER
Last Appointment:  8/7/2024  Future Appointments   Date Time Provider Department Center   10/15/2024  1:30 PM Layo Ha DO [facility] DEP

## 2024-09-04 NOTE — TELEPHONE ENCOUNTER
Patient called for a refill on this med.    ALPRAZolam (XANAX) 1 MG     Pharmacy  [facility]  [address] [place], [place] [address] [phone]

## 2024-09-17 DIAGNOSIS — I10 ESSENTIAL HYPERTENSION: Chronic | ICD-10-CM

## 2024-09-18 RX ORDER — AMLODIPINE BESYLATE 5 MG/1
5 TABLET ORAL DAILY
Qty: 90 TABLET | Refills: 1 | Status: SHIPPED | OUTPATIENT
Start: 2024-09-18

## 2024-10-15 ENCOUNTER — OFFICE VISIT (OUTPATIENT)
Dept: FAMILY MEDICINE CLINIC | Age: 79
End: 2024-10-15
Payer: MEDICARE

## 2024-10-15 VITALS
BODY MASS INDEX: 27.69 KG/M2 | OXYGEN SATURATION: 96 % | TEMPERATURE: 98 F | RESPIRATION RATE: 17 BRPM | SYSTOLIC BLOOD PRESSURE: 132 MMHG | DIASTOLIC BLOOD PRESSURE: 79 MMHG | HEART RATE: 73 BPM | HEIGHT: 65 IN | WEIGHT: 166.2 LBS

## 2024-10-15 DIAGNOSIS — N18.32 STAGE 3B CHRONIC KIDNEY DISEASE (HCC): Chronic | ICD-10-CM

## 2024-10-15 DIAGNOSIS — I12.9 BENIGN HYPERTENSIVE RENAL DISEASE: Primary | Chronic | ICD-10-CM

## 2024-10-15 DIAGNOSIS — H90.3 SENSORINEURAL HEARING LOSS (SNHL) OF BOTH EARS: ICD-10-CM

## 2024-10-15 DIAGNOSIS — M54.16 LUMBAR RADICULOPATHY: ICD-10-CM

## 2024-10-15 DIAGNOSIS — E78.2 MIXED HYPERLIPIDEMIA: Chronic | ICD-10-CM

## 2024-10-15 DIAGNOSIS — G89.4 CHRONIC PAIN SYNDROME: ICD-10-CM

## 2024-10-15 DIAGNOSIS — Z23 NEED FOR IMMUNIZATION AGAINST INFLUENZA: ICD-10-CM

## 2024-10-15 PROCEDURE — G8400 PT W/DXA NO RESULTS DOC: HCPCS | Performed by: STUDENT IN AN ORGANIZED HEALTH CARE EDUCATION/TRAINING PROGRAM

## 2024-10-15 PROCEDURE — 1036F TOBACCO NON-USER: CPT | Performed by: STUDENT IN AN ORGANIZED HEALTH CARE EDUCATION/TRAINING PROGRAM

## 2024-10-15 PROCEDURE — 3078F DIAST BP <80 MM HG: CPT | Performed by: STUDENT IN AN ORGANIZED HEALTH CARE EDUCATION/TRAINING PROGRAM

## 2024-10-15 PROCEDURE — 3075F SYST BP GE 130 - 139MM HG: CPT | Performed by: STUDENT IN AN ORGANIZED HEALTH CARE EDUCATION/TRAINING PROGRAM

## 2024-10-15 PROCEDURE — 1123F ACP DISCUSS/DSCN MKR DOCD: CPT | Performed by: STUDENT IN AN ORGANIZED HEALTH CARE EDUCATION/TRAINING PROGRAM

## 2024-10-15 PROCEDURE — G8427 DOCREV CUR MEDS BY ELIG CLIN: HCPCS | Performed by: STUDENT IN AN ORGANIZED HEALTH CARE EDUCATION/TRAINING PROGRAM

## 2024-10-15 PROCEDURE — 1090F PRES/ABSN URINE INCON ASSESS: CPT | Performed by: STUDENT IN AN ORGANIZED HEALTH CARE EDUCATION/TRAINING PROGRAM

## 2024-10-15 PROCEDURE — G0008 ADMIN INFLUENZA VIRUS VAC: HCPCS | Performed by: STUDENT IN AN ORGANIZED HEALTH CARE EDUCATION/TRAINING PROGRAM

## 2024-10-15 PROCEDURE — G8417 CALC BMI ABV UP PARAM F/U: HCPCS | Performed by: STUDENT IN AN ORGANIZED HEALTH CARE EDUCATION/TRAINING PROGRAM

## 2024-10-15 PROCEDURE — 99214 OFFICE O/P EST MOD 30 MIN: CPT | Performed by: STUDENT IN AN ORGANIZED HEALTH CARE EDUCATION/TRAINING PROGRAM

## 2024-10-15 PROCEDURE — G8482 FLU IMMUNIZE ORDER/ADMIN: HCPCS | Performed by: STUDENT IN AN ORGANIZED HEALTH CARE EDUCATION/TRAINING PROGRAM

## 2024-10-15 PROCEDURE — 90653 IIV ADJUVANT VACCINE IM: CPT | Performed by: STUDENT IN AN ORGANIZED HEALTH CARE EDUCATION/TRAINING PROGRAM

## 2024-10-15 RX ORDER — MORPHINE SULFATE 30 MG/1
30 TABLET, FILM COATED, EXTENDED RELEASE ORAL 2 TIMES DAILY
COMMUNITY
Start: 2024-09-27 | End: 2024-10-15

## 2024-10-15 RX ORDER — ALPRAZOLAM 1 MG
1 TABLET ORAL 3 TIMES DAILY PRN
COMMUNITY

## 2024-10-15 NOTE — PROGRESS NOTES
Patient:  Amy Arizmendi 78 y.o. female     10/15/24      Chiefcomplaint:   Chief Complaint   Patient presents with    Hypertension    Hyperlipidemia     Problems and Overview   Htn losartan 50 and norvasc 5  BP Readings from Last 3 Encounters:   10/15/24 132/79   08/07/24 132/74   04/04/24 123/75     Hld on crestor 10  Lab Results   Component Value Date    CHOL 157 08/12/2024    TRIG 102 08/12/2024    HDL 46 08/12/2024    LDL 91 08/12/2024    VLDL 20 08/12/2024     Diagnosed with sensorineural hearing loss since last seen    Considering getting injection for back pain - has fu appointment to discuss this    Ckd -   Lab Results   Component Value Date    CREATININE 1.4 (H) 08/12/2024    CREATININE 1.2 (H) 02/05/2024    CREATININE 1.2 (H) 06/05/2023         Patient Active Problem List    Diagnosis Date Noted    Open-angle glaucoma of right eye 02/17/2023     Priority: Medium    Diastolic dysfunction 02/01/2023     Priority: Medium    History of cerebellar stroke 02/01/2023     Priority: Medium    Hyperlipidemia 01/05/2022     Priority: Medium    Benign hypertensive renal disease 06/01/2021     Priority: Medium    Lumbar radiculopathy 10/15/2024     Comment on above: bilateral      Neck pain 08/07/2024    Mitral regurgitation 04/04/2024    Platelets decreased ([facility]) 04/04/2024    Degeneration of intervertebral disc of lumbosacral region 12/05/2023    CKD (chronic kidney disease) stage 3, GFR 30-59 ml/min (HCC) 08/05/2020    Essential hypertension 02/16/2012      Prevention:  Health Maintenance Due   Topic Date Due    DTaP/Tdap/Td vaccine (1 - Tdap) Never done    DEXA (modify frequency per FRAX score)  Never done    Flu vaccine (1) 08/01/2024    COVID-19 Vaccine (6 - 2023-24 season) 09/01/2024      Meds prior:  Current Outpatient Medications   Medication Instructions    ALPRAZolam (XANAX) 1 mg, Oral, 3 TIMES DAILY PRN    amLODIPine (NORVASC) 5 mg, Oral, DAILY    aspirin EC 81 mg, Oral, DAILY    brimonidine (ALPHAGAN) 0.2

## 2024-10-23 DIAGNOSIS — F41.9 ANXIETY: Primary | ICD-10-CM

## 2024-10-23 NOTE — TELEPHONE ENCOUNTER
Last refill:  ALPRAZOLAM 1MG      TAB 09/04/2024 30 90 each Layo Ha DO [facility] ...   ALPRAZOLAM 1MG      TAB 07/09/2024 30 90 each Layo Ha DO [facility] ...   ALPRAZOLAM 1MG      TAB 05/20/2024 30 90 each Layo Ha DO [facility] ...   ALPRAZolam 1MG      TAB 03/12/2024 30 90 each Layo Ha DO [facility] ...   ALPRAZolam 1MG      TAB 01/03/2024 30 90 each Layo Ha DO [facility] ...             Name of Medication(s) Requested:  Requested Prescriptions     Pending Prescriptions Disp Refills    ALPRAZolam (XANAX) 1 MG tablet       Sig: Take 1 tablet by mouth 3 times daily as needed for Sleep. Max Daily Amount: 3 mg       Medication is on current medication list Yes    Dosage and directions were verified? Yes    Quantity verified: 30 day supply     Pharmacy Verified?  Yes    Last Appointment:  10/15/2024    Future appts:  Future Appointments   Date Time Provider Department Center   2/18/2025  2:00 PM Layo Ha DO [place] [facility]        (If no appt send self scheduling link. .REFILLAPPT)  Scheduling request sent?     [] Yes  [x] No    Does patient need updated?  [] Yes  [x] No

## 2024-10-23 NOTE — TELEPHONE ENCOUNTER
Pt called for a refill on this med.    ALPRAZolam (XANAX) 1 MG     [facility] [address] [place] [address] -    [phone] -  [phone]

## 2024-10-24 RX ORDER — ALPRAZOLAM 1 MG/1
1 TABLET ORAL 3 TIMES DAILY PRN
Qty: 90 TABLET | Refills: 0 | Status: SHIPPED | OUTPATIENT
Start: 2024-10-24 | End: 2024-11-23

## 2024-12-05 DIAGNOSIS — F41.9 ANXIETY: Primary | ICD-10-CM

## 2024-12-05 RX ORDER — ALPRAZOLAM 1 MG/1
1 TABLET ORAL 3 TIMES DAILY PRN
Qty: 30 TABLET | Refills: 0 | Status: SHIPPED
Start: 2024-12-05 | End: 2024-12-06 | Stop reason: SDUPTHER

## 2024-12-05 RX ORDER — ALPRAZOLAM 1 MG/1
1 TABLET ORAL 3 TIMES DAILY PRN
COMMUNITY
End: 2024-12-05 | Stop reason: SDUPTHER

## 2024-12-05 NOTE — TELEPHONE ENCOUNTER
Name of Medication(s) Requested:    ALPRAZolam (XANAX) 1 MG tablet       Medication is on current medication list Yes    Dosage and directions were verified? Yes    Quantity verified: 30 day supply     Pharmacy Verified?  Yes - [facility] [place]    Last Appointment:  10/15/2024    Future appts:  Future Appointments   Date Time Provider Department Center   2/18/2025  2:00 PM Layo Ha DO [place] [facility] DE[facility]        (If no appt send self scheduling link. .REFILLAPPT)  Scheduling request sent?     [] Yes  [] No    Does patient need updated?  [] Yes  [] No

## 2024-12-06 ENCOUNTER — CLINICAL DOCUMENTATION (OUTPATIENT)
Dept: FAMILY MEDICINE CLINIC | Age: 79
End: 2024-12-06

## 2024-12-06 ENCOUNTER — TELEPHONE (OUTPATIENT)
Dept: FAMILY MEDICINE CLINIC | Age: 79
End: 2024-12-06

## 2024-12-06 DIAGNOSIS — F41.9 ANXIETY: ICD-10-CM

## 2024-12-06 RX ORDER — ALPRAZOLAM 1 MG/1
1 TABLET ORAL 3 TIMES DAILY PRN
Qty: 90 TABLET | Refills: 0 | Status: SHIPPED | OUTPATIENT
Start: 2024-12-06 | End: 2025-01-05

## 2024-12-06 NOTE — TELEPHONE ENCOUNTER
Pt called in reports that Rx- Xanax was supposed to be 90 days supply not 30. Pt is wondering if something can be done to fix it.

## 2024-12-09 ENCOUNTER — APPOINTMENT (OUTPATIENT)
Dept: CT IMAGING | Age: 79
End: 2024-12-09
Payer: MEDICARE

## 2024-12-09 ENCOUNTER — APPOINTMENT (OUTPATIENT)
Dept: GENERAL RADIOLOGY | Age: 79
End: 2024-12-09
Payer: MEDICARE

## 2024-12-09 ENCOUNTER — TELEPHONE (OUTPATIENT)
Dept: FAMILY MEDICINE CLINIC | Age: 79
End: 2024-12-09

## 2024-12-09 ENCOUNTER — HOSPITAL ENCOUNTER (EMERGENCY)
Age: 79
Discharge: HOME | End: 2024-12-09
Attending: EMERGENCY MEDICINE
Payer: MEDICARE

## 2024-12-09 VITALS
SYSTOLIC BLOOD PRESSURE: 134 MMHG | RESPIRATION RATE: 16 BRPM | OXYGEN SATURATION: 100 % | WEIGHT: 163 LBS | TEMPERATURE: 98.8 F | HEART RATE: 73 BPM | BODY MASS INDEX: 27.16 KG/M2 | DIASTOLIC BLOOD PRESSURE: 76 MMHG | HEIGHT: 65 IN
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
## 2024-12-09 DIAGNOSIS — M87.052 AVASCULAR NECROSIS OF FEMORAL HEAD, LEFT: ICD-10-CM

## 2024-12-09 PROCEDURE — 73030 X-RAY EXAM OF SHOULDER: CPT

## 2024-12-09 PROCEDURE — 73590 X-RAY EXAM OF LOWER LEG: CPT

## 2024-12-09 PROCEDURE — 6370000000 HC RX 637 (ALT 250 FOR IP): Performed by: EMERGENCY MEDICINE

## 2024-12-09 PROCEDURE — 96372 THER/PROPH/DIAG INJ SC/IM: CPT

## 2024-12-09 PROCEDURE — 71250 CT THORAX DX C-: CPT

## 2024-12-09 PROCEDURE — 73502 X-RAY EXAM HIP UNI 2-3 VIEWS: CPT

## 2024-12-09 PROCEDURE — 73562 X-RAY EXAM OF KNEE 3: CPT

## 2024-12-09 PROCEDURE — 6360000002 HC RX W HCPCS: Performed by: EMERGENCY MEDICINE

## 2024-12-09 PROCEDURE — 73080 X-RAY EXAM OF ELBOW: CPT

## 2024-12-09 PROCEDURE — 73060 X-RAY EXAM OF HUMERUS: CPT

## 2024-12-09 PROCEDURE — 70450 CT HEAD/BRAIN W/O DYE: CPT

## 2024-12-09 PROCEDURE — 73552 X-RAY EXAM OF FEMUR 2/>: CPT

## 2024-12-09 PROCEDURE — 72192 CT PELVIS W/O DYE: CPT

## 2024-12-09 PROCEDURE — 73610 X-RAY EXAM OF ANKLE: CPT

## 2024-12-09 PROCEDURE — 99284 EMERGENCY DEPT VISIT MOD MDM: CPT

## 2024-12-09 PROCEDURE — 72125 CT NECK SPINE W/O DYE: CPT

## 2024-12-09 RX ORDER — OXYCODONE AND ACETAMINOPHEN 5; 325 MG/1; MG/1
1 TABLET ORAL ONCE
Status: COMPLETED | OUTPATIENT
Start: 2024-12-09 | End: 2024-12-09

## 2024-12-09 RX ORDER — FENTANYL CITRATE 50 UG/ML
25 INJECTION, SOLUTION INTRAMUSCULAR; INTRAVENOUS ONCE
Status: COMPLETED | OUTPATIENT
Start: 2024-12-09 | End: 2024-12-09

## 2024-12-09 RX ORDER — MORPHINE SULFATE 2 MG/ML
2 INJECTION, SOLUTION INTRAMUSCULAR; INTRAVENOUS ONCE
Status: DISCONTINUED | OUTPATIENT
Start: 2024-12-09 | End: 2024-12-09

## 2024-12-09 RX ADMIN — OXYCODONE HYDROCHLORIDE AND ACETAMINOPHEN 1 TABLET: 5; 325 TABLET ORAL at 16:23

## 2024-12-09 RX ADMIN — FENTANYL CITRATE 25 MCG: 50 INJECTION INTRAMUSCULAR; INTRAVENOUS at 18:24

## 2024-12-09 ASSESSMENT — PAIN SCALES - GENERAL
PAINLEVEL_OUTOF10: 8

## 2024-12-09 ASSESSMENT — PAIN DESCRIPTION - ORIENTATION: ORIENTATION: LEFT

## 2024-12-09 ASSESSMENT — PAIN - FUNCTIONAL ASSESSMENT: PAIN_FUNCTIONAL_ASSESSMENT: 0-10

## 2024-12-09 ASSESSMENT — PAIN DESCRIPTION - LOCATION: LOCATION: ELBOW;SHOULDER;HIP

## 2024-12-09 NOTE — ED PROVIDER NOTES
there is osteonecrosis of the left normal head.  I spoke with patient.  She has an orthopedist but has not had chronic hip issues she does not is not aware of any known avascular necrosis.  I spoke with her orthopedic group they recommended outpatient follow-up.  Patient is able to ambulate to the bathroom without assistance.  She is able to ambulate on that hip minimal discomfort.  She is comfortable plan for outpatient follow-up with orthopedics.  She was made aware of these new findings are incidental in nature.  These were not caused by the fall.  She is comfortable with plan for follow-up with her PCP as well as she will return for nursing symptoms stable for discharge.        Social determinants affecting disposition:   Patient has PCP for follow-up.  She has orthopedics as well.      ED Course as of 12/09/24 2203   Mon Dec 09, 2024   2113 Patient was able to ambulate to the bathroom without assistance. [KK]   2129 I spoke to Dr. Ian Wood on-call for Dr. Suero for orthopedics.  He is comfortable patient following up as an outpatient regarding avascular necrosis of the femoral head, as this is a chronic condition. [KK]   2202 In speaking with patient further, she states she does have waxing and waning left hip pain has been ongoing for at least the past 9 months.  She has no hip pain at this time she is able to ambulate.  She has a friend here to take her home.  She will follow-up outpatient with orthopedics regarding findings of avascular necrosis on CT imaging.  She is comfortable with this plan. [KK]      ED Course User Index  [KK] Rashmi Garcia MD          CONSULTS: (Who and What was discussed)  IP CONSULT TO ORTHOPEDIC SURGERY  See ED course      I am the Primary Clinician of Record.    FINAL IMPRESSION      1. Fall, initial encounter    2. Avascular necrosis of femoral head, left          DISPOSITION/PLAN     DISPOSITION Decision To Discharge 12/09/2024 10:00:56 PM      PATIENT REFERRED

## 2024-12-09 NOTE — TELEPHONE ENCOUNTER
Pt called in reports that Rx- Alprazolam (Xanax) 1MG was supposed to be 90 days supply not 30. Pt is wondering if something can be done to fix it.       [facility] [address] [place] [address] -    [phone] - F [phone]

## 2024-12-10 NOTE — ED NOTES
Patient ambulated to and from the bathroom with standby assistance from RN. Reports feeling increasingly better at this time.

## 2024-12-11 ENCOUNTER — TELEPHONE (OUTPATIENT)
Dept: AUDIOLOGY | Age: 79
End: 2024-12-11

## 2024-12-11 NOTE — TELEPHONE ENCOUNTER
Patient called and left a voicemail message.   Returned patient message. Spoke to her. She wishes to inquire about hearing aids.  She was informed that since she has [facility] [employer] that she will need to call them for hearing aids.  Explained that hearing testing is good for 6 months for hearing aid purposes and that her hearing eval was done in August. She will call med records for her test results and then call her insurance to see about hearing aids. She will call us back as needed.     Electronically signed by Antonio Guevara on 12/11/2024 at 4:08 PM

## 2024-12-17 DIAGNOSIS — E78.2 MIXED HYPERLIPIDEMIA: ICD-10-CM

## 2024-12-17 RX ORDER — ROSUVASTATIN CALCIUM 10 MG/1
TABLET, COATED ORAL
Qty: 100 TABLET | Refills: 2 | Status: SHIPPED | OUTPATIENT
Start: 2024-12-17

## 2024-12-17 NOTE — TELEPHONE ENCOUNTER
Name of Medication(s) Requested:  Requested Prescriptions     Pending Prescriptions Disp Refills    rosuvastatin (CRESTOR) 10 MG tablet [Pharmacy Med Name: Rosuvastatin Calcium 10 MG Oral Tablet] 100 tablet 2     Sig: TAKE 1 TABLET BY MOUTH AT NIGHT       Medication is on current medication list Yes    Dosage and directions were verified? Yes    Quantity verified: 90 day supply     Pharmacy Verified?  Yes    Last Appointment:  10/15/2024    Future appts:  Future Appointments   Date Time Provider Department Center   12/19/2024 11:45 AM Layo Ha DO [place] [facility]   2/18/2025  2:00 PM Layo Ha DO [place] [facility]   3/10/2025  1:45 PM [place] DO [place] [facility]        (If no appt send self scheduling link. .REFILLAPPT)  Scheduling request sent?     [] Yes  [] No    Does patient need updated?  [] Yes  [] No

## 2024-12-19 ENCOUNTER — OFFICE VISIT (OUTPATIENT)
Dept: FAMILY MEDICINE CLINIC | Age: 79
End: 2024-12-19

## 2024-12-19 VITALS
DIASTOLIC BLOOD PRESSURE: 79 MMHG | TEMPERATURE: 99.1 F | BODY MASS INDEX: 27.79 KG/M2 | WEIGHT: 167 LBS | SYSTOLIC BLOOD PRESSURE: 133 MMHG | HEART RATE: 73 BPM

## 2024-12-19 DIAGNOSIS — M25.512 ACUTE PAIN OF LEFT SHOULDER: ICD-10-CM

## 2024-12-19 DIAGNOSIS — M25.552 PAIN OF LEFT HIP: Primary | ICD-10-CM

## 2024-12-19 NOTE — PROGRESS NOTES
Patient:  Amy Arizmendi 79 y.o. female     12/19/24      Chiefcomplaint:   Chief Complaint   Patient presents with    Follow-up     Pt here for f/u from fall she had on 12/9/24. Pt states she is still soar.      History of Present Illness   Fall, went to ED    Had a lot of imaging  Maybe avascular necrosis left femoral head. Other imaging ok  Hx rotator cuff surgery on left shoulder    CT pelvis  IMPRESSION:  1. Findings above may indicate advanced osteonecrosis involving left femoral  head.  2. No evidence of acute fracture or dislocation.     On chronic opiates  Taking tylenol in between    Health Maintenance Due   Topic Date Due    DTaP/Tdap/Td vaccine (1 - Tdap) Never done    DEXA (modify frequency per FRAX score)  Never done    COVID-19 Vaccine (6 - 2023-24 season) 09/01/2024      History:  Prior to Visit Medications    Medication Sig Taking? Authorizing Provider   rosuvastatin (CRESTOR) 10 MG tablet TAKE 1 TABLET BY MOUTH AT NIGHT Yes Layo Ha DO   ALPRAZolam (XANAX) 1 MG tablet Take 1 tablet by mouth 3 times daily as needed for Anxiety for up to 30 days. Max Daily Amount: 3 mg Yes Layo Ha DO   amLODIPine (NORVASC) 5 MG tablet TAKE 1 TABLET BY MOUTH DAILY Yes Layo Ha DO   losartan (COZAAR) 50 MG tablet TAKE 1 TABLET BY MOUTH DAILY Yes Layo Ha DO   clopidogrel (PLAVIX) 75 MG tablet TAKE 1 TABLET BY MOUTH DAILY Yes Layo Ha DO   Handicap Placard MISC by Does not apply route Patient cannot walk 200 ft without stopping to rest.    Expiration 07/01/2028 Yes Layo Ha DO   brimonidine (ALPHAGAN) 0.2 % ophthalmic solution INSTILL 1 DROP INTO EACH EYE TWICE DAILY Yes ProviderJose MD   senna (SENOKOT) 8.6 MG tablet Take 1 tablet by mouth daily Yes ProviderJose MD   timolol (TIMOPTIC) 0.5 % ophthalmic solution INSTILL 1 DROP INTO EACH EYE TWICE DAILY Yes Provider, MD Jose   aspirin EC 81 MG EC tablet Take 1 tablet by mouth daily Yes

## 2025-01-30 DIAGNOSIS — F41.9 ANXIETY: ICD-10-CM

## 2025-01-30 RX ORDER — ALPRAZOLAM 1 MG/1
1 TABLET ORAL 3 TIMES DAILY PRN
Qty: 90 TABLET | Refills: 0 | Status: SHIPPED | OUTPATIENT
Start: 2025-01-30 | End: 2025-03-01

## 2025-01-30 NOTE — TELEPHONE ENCOUNTER
Name of Medication(s) Requested:  Requested Prescriptions     Pending Prescriptions Disp Refills    ALPRAZolam (XANAX) 1 MG tablet 90 tablet 0     Sig: Take 1 tablet by mouth 3 times daily as needed for Anxiety for up to 30 days. Max Daily Amount: 3 mg       Medication is on current medication list Yes    Dosage and directions were verified? Yes    Quantity verified: 90 day supply     Pharmacy Verified?  Yes    Last Appointment:  12/19/2024    Future appts:  Future Appointments   Date Time Provider Department Center   2/18/2025  2:00 PM Layo Ha DO [place] [facility] DEP   3/10/2025  1:45 PM [place] [facility]        (If no appt send self scheduling link. .REFILLAPPT)  Scheduling request sent?     [] Yes  [x] No    Does patient need updated?  [] Yes  [x] No

## 2025-02-25 RX ORDER — CLOPIDOGREL BISULFATE 75 MG/1
75 TABLET ORAL DAILY
Qty: 100 TABLET | Refills: 1 | Status: SHIPPED | OUTPATIENT
Start: 2025-02-25

## 2025-02-25 NOTE — TELEPHONE ENCOUNTER
Name of Medication(s) Requested:  Requested Prescriptions     Pending Prescriptions Disp Refills    clopidogrel (PLAVIX) 75 MG tablet [Pharmacy Med Name: Clopidogrel Bisulfate 75 MG Oral Tablet] 100 tablet 2     Sig: TAKE 1 TABLET BY MOUTH DAILY       Medication is on current medication list Yes    Dosage and directions were verified? Yes    Quantity verified: 90 day supply     Pharmacy Verified?  Yes    Last Appointment:  12/19/2024    Future appts:  Future Appointments   Date Time Provider Department Center   2/28/2025 11:30 AM Layo Ha DO [place] [facility]   3/10/2025  1:45 PM [place] [facility]        (If no appt send self scheduling link. .REFILLAPPT)  Scheduling request sent?     [] Yes  [] No    Does patient need updated?  [] Yes  [] No

## 2025-02-28 ENCOUNTER — OFFICE VISIT (OUTPATIENT)
Dept: FAMILY MEDICINE CLINIC | Age: 80
End: 2025-02-28

## 2025-02-28 VITALS
TEMPERATURE: 97.3 F | HEART RATE: 64 BPM | WEIGHT: 164.4 LBS | HEIGHT: 65 IN | RESPIRATION RATE: 18 BRPM | BODY MASS INDEX: 27.39 KG/M2 | DIASTOLIC BLOOD PRESSURE: 84 MMHG | SYSTOLIC BLOOD PRESSURE: 129 MMHG | OXYGEN SATURATION: 97 %

## 2025-02-28 DIAGNOSIS — N18.32 STAGE 3B CHRONIC KIDNEY DISEASE (HCC): Chronic | ICD-10-CM

## 2025-02-28 DIAGNOSIS — I10 ESSENTIAL HYPERTENSION: Primary | Chronic | ICD-10-CM

## 2025-02-28 DIAGNOSIS — R73.03 PREDIABETES: Chronic | ICD-10-CM

## 2025-02-28 DIAGNOSIS — E78.2 MIXED HYPERLIPIDEMIA: Chronic | ICD-10-CM

## 2025-02-28 PROBLEM — N25.81 SECONDARY HYPERPARATHYROIDISM OF RENAL ORIGIN: Status: ACTIVE | Noted: 2025-02-28

## 2025-02-28 RX ORDER — MORPHINE SULFATE 30 MG/1
30 TABLET, FILM COATED, EXTENDED RELEASE ORAL 2 TIMES DAILY
COMMUNITY
Start: 2025-02-26

## 2025-02-28 RX ORDER — AMLODIPINE BESYLATE 5 MG/1
5 TABLET ORAL DAILY
Qty: 90 TABLET | Refills: 3 | Status: SHIPPED | OUTPATIENT
Start: 2025-02-28

## 2025-02-28 SDOH — ECONOMIC STABILITY: FOOD INSECURITY: WITHIN THE PAST 12 MONTHS, YOU WORRIED THAT YOUR FOOD WOULD RUN OUT BEFORE YOU GOT MONEY TO BUY MORE.: NEVER TRUE

## 2025-02-28 SDOH — ECONOMIC STABILITY: FOOD INSECURITY: WITHIN THE PAST 12 MONTHS, THE FOOD YOU BOUGHT JUST DIDN'T LAST AND YOU DIDN'T HAVE MONEY TO GET MORE.: NEVER TRUE

## 2025-02-28 ASSESSMENT — PATIENT HEALTH QUESTIONNAIRE - PHQ9
SUM OF ALL RESPONSES TO PHQ QUESTIONS 1-9: 2
2. FEELING DOWN, DEPRESSED OR HOPELESS: SEVERAL DAYS
SUM OF ALL RESPONSES TO PHQ QUESTIONS 1-9: 2
SUM OF ALL RESPONSES TO PHQ9 QUESTIONS 1 & 2: 2
1. LITTLE INTEREST OR PLEASURE IN DOING THINGS: SEVERAL DAYS
SUM OF ALL RESPONSES TO PHQ QUESTIONS 1-9: 2
SUM OF ALL RESPONSES TO PHQ QUESTIONS 1-9: 2

## 2025-02-28 NOTE — PROGRESS NOTES
Patient:  Amy Arizmendi 79 y.o. female     02/28/25      Chiefcomplaint:   Chief Complaint   Patient presents with    Follow-up     Problems and Overview   Still sore from fall a few months ago  In PT  Following yoa. Dr Trevino, Dr Suero  Sometimes has to get into recliner bc can't sleep  Hip with left femoral avascular necrosis  R shoulder also sore    Htn losartan 50 and norvasc 5  BP Readings from Last 3 Encounters:   02/28/25 129/84   12/19/24 133/79   12/09/24 134/76     Hld on crestor 10  Lab Results   Component Value Date    CHOL 157 08/12/2024    TRIG 102 08/12/2024    HDL 46 08/12/2024    LDL 91 08/12/2024    VLDL 20 08/12/2024     Diagnosed with sensorineural hearing loss since last seen    Ckd  Lab Results   Component Value Date    CREATININE 1.4 (H) 08/12/2024    CREATININE 1.2 (H) 02/05/2024    CREATININE 1.2 (H) 06/05/2023     Patient Active Problem List    Diagnosis Date Noted    Open-angle glaucoma of right eye 02/17/2023     Priority: Medium    Diastolic dysfunction 02/01/2023     Priority: Medium    History of cerebellar stroke 02/01/2023     Priority: Medium    Hyperlipidemia 01/05/2022     Priority: Medium    Benign hypertensive renal disease 06/01/2021     Priority: Medium    Secondary hyperparathyroidism of renal origin 02/28/2025    Prediabetes 02/28/2025    Lumbar radiculopathy 10/15/2024     Comment on above: bilateral      Neck pain 08/07/2024    Mitral regurgitation 04/04/2024    Platelets decreased 04/04/2024    Degeneration of intervertebral disc of lumbosacral region 12/05/2023    Stage 3b chronic kidney disease (HCC) 08/05/2020    Essential hypertension 02/16/2012      Prevention:  Health Maintenance Due   Topic Date Due    DTaP/Tdap/Td vaccine (1 - Tdap) Never done    DEXA (modify frequency per FRAX score)  Never done    COVID-19 Vaccine (6 - 2024-25 season) 09/01/2024    Annual Wellness Visit (Medicare Advantage)  01/01/2025      Meds prior:  Current Outpatient Medications

## 2025-03-06 ENCOUNTER — TELEPHONE (OUTPATIENT)
Dept: ENT CLINIC | Age: 80
End: 2025-03-06

## 2025-03-06 NOTE — TELEPHONE ENCOUNTER
Contacted patient to discuss rescheduling, states she is busy with other appointments right now and will call back to reschedule at a later time.

## 2025-03-07 DIAGNOSIS — F41.9 ANXIETY: ICD-10-CM

## 2025-03-07 RX ORDER — ALPRAZOLAM 1 MG/1
1 TABLET ORAL 3 TIMES DAILY PRN
Qty: 90 TABLET | Refills: 0 | Status: SHIPPED | OUTPATIENT
Start: 2025-03-07 | End: 2025-04-06

## 2025-03-07 NOTE — TELEPHONE ENCOUNTER
Name of Medication(s) Requested:  Requested Prescriptions      No prescriptions requested or ordered in this encounter     Alprazolam  Medication is on current medication list Yes    Dosage and directions were verified? Yes    Quantity verified: 30 day supply     Pharmacy Verified?  Yes [facility] [address]    Last Appointment:  2/28/2025    Future appts:  Future Appointments   Date Time Provider Department Center   7/8/2025  1:30 PM Layo Ha DO [place] [facility] DEP        (If no appt send self scheduling link. .REFILLAPPT)  Scheduling request sent?     [] Yes  [] No    Does patient need updated?  [] Yes  [] No

## 2025-03-11 ENCOUNTER — HOSPITAL ENCOUNTER (OUTPATIENT)
Age: 80
Discharge: HOME | End: 2025-03-11
Payer: MEDICARE

## 2025-03-11 LAB
25(OH)D3 SERPL-MCNC: 38.7 NG/ML (ref 30–100)
ALBUMIN SERPL-MCNC: 3.9 G/DL (ref 3.5–5.2)
ALP SERPL-CCNC: 88 U/L (ref 35–104)
ALT SERPL-CCNC: 12 U/L (ref 0–32)
AMORPH SED URNS QL MICRO: PRESENT
ANION GAP SERPL CALCULATED.3IONS-SCNC: 8 MMOL/L (ref 7–16)
AST SERPL-CCNC: 12 U/L (ref 0–31)
BASOPHILS # BLD: 0.02 K/UL (ref 0–0.2)
BASOPHILS NFR BLD: 0 % (ref 0–2)
BILIRUB SERPL-MCNC: 0.5 MG/DL (ref 0–1.2)
BILIRUB UR QL STRIP: ABNORMAL
BUN SERPL-MCNC: 23 MG/DL (ref 6–23)
CALCIUM SERPL-MCNC: 8.6 MG/DL (ref 8.6–10.2)
CHLORIDE SERPL-SCNC: 107 MMOL/L (ref 98–107)
CLARITY UR: CLEAR
CO2 SERPL-SCNC: 27 MMOL/L (ref 22–29)
COLOR UR: YELLOW
CREAT SERPL-MCNC: 1.4 MG/DL (ref 0.5–1)
CREAT UR-MCNC: 483 MG/DL (ref 29–226)
EOSINOPHIL # BLD: 0.14 K/UL (ref 0.05–0.5)
EOSINOPHILS RELATIVE PERCENT: 3 % (ref 0–6)
EPI CELLS #/AREA URNS HPF: ABNORMAL /HPF
ERYTHROCYTE [DISTWIDTH] IN BLOOD BY AUTOMATED COUNT: 16.2 % (ref 11.5–15)
GFR, ESTIMATED: 40 ML/MIN/1.73M2
GLUCOSE SERPL-MCNC: 102 MG/DL (ref 74–99)
GLUCOSE UR STRIP-MCNC: NEGATIVE MG/DL
HBA1C MFR BLD: 5.9 % (ref 4–5.6)
HCT VFR BLD AUTO: 37.2 % (ref 34–48)
HGB BLD-MCNC: 11.9 G/DL (ref 11.5–15.5)
HGB UR QL STRIP.AUTO: NEGATIVE
IMM GRANULOCYTES # BLD AUTO: <0.03 K/UL (ref 0–0.58)
IMM GRANULOCYTES NFR BLD: 0 % (ref 0–5)
KETONES UR STRIP-MCNC: NEGATIVE MG/DL
LEUKOCYTE ESTERASE UR QL STRIP: NEGATIVE
LYMPHOCYTES NFR BLD: 2.28 K/UL (ref 1.5–4)
LYMPHOCYTES RELATIVE PERCENT: 44 % (ref 20–42)
MAGNESIUM SERPL-MCNC: 2.2 MG/DL (ref 1.6–2.6)
MCH RBC QN AUTO: 26 PG (ref 26–35)
MCHC RBC AUTO-ENTMCNC: 32 G/DL (ref 32–34.5)
MCV RBC AUTO: 81.4 FL (ref 80–99.9)
MICROALBUMIN UR-MCNC: 39 MG/L (ref 0–19)
MICROALBUMIN/CREAT UR-RTO: 8 MCG/MG CREAT (ref 0–30)
MONOCYTES NFR BLD: 0.35 K/UL (ref 0.1–0.95)
MONOCYTES NFR BLD: 7 % (ref 2–12)
MUCOUS THREADS URNS QL MICRO: PRESENT
NEUTROPHILS NFR BLD: 46 % (ref 43–80)
NEUTS SEG NFR BLD: 2.41 K/UL (ref 1.8–7.3)
NITRITE UR QL STRIP: NEGATIVE
PH UR STRIP: 5.5 [PH] (ref 5–8)
PHOSPHATE SERPL-MCNC: 3.2 MG/DL (ref 2.5–4.5)
PLATELET # BLD AUTO: 131 K/UL (ref 130–450)
PMV BLD AUTO: 9.3 FL (ref 7–12)
POTASSIUM SERPL-SCNC: 4.3 MMOL/L (ref 3.5–5)
PROT SERPL-MCNC: 6.4 G/DL (ref 6.4–8.3)
PROT UR STRIP-MCNC: ABNORMAL MG/DL
PTH-INTACT SERPL-MCNC: 115.1 PG/ML (ref 15–65)
RBC # BLD AUTO: 4.57 M/UL (ref 3.5–5.5)
RBC #/AREA URNS HPF: ABNORMAL /HPF
SODIUM SERPL-SCNC: 142 MMOL/L (ref 132–146)
SP GR UR STRIP: >1.03 (ref 1–1.03)
URATE SERPL-MCNC: 3.9 MG/DL (ref 2.4–5.7)
UROBILINOGEN UR STRIP-ACNC: 0.2 EU/DL (ref 0–1)
WBC #/AREA URNS HPF: ABNORMAL /HPF
WBC OTHER # BLD: 5.2 K/UL (ref 4.5–11.5)

## 2025-03-11 PROCEDURE — 82043 UR ALBUMIN QUANTITATIVE: CPT

## 2025-03-11 PROCEDURE — 82570 ASSAY OF URINE CREATININE: CPT

## 2025-03-11 PROCEDURE — 83970 ASSAY OF PARATHORMONE: CPT

## 2025-03-11 PROCEDURE — 81001 URINALYSIS AUTO W/SCOPE: CPT

## 2025-03-11 PROCEDURE — 84550 ASSAY OF BLOOD/URIC ACID: CPT

## 2025-03-11 PROCEDURE — 83036 HEMOGLOBIN GLYCOSYLATED A1C: CPT

## 2025-03-11 PROCEDURE — 36415 COLL VENOUS BLD VENIPUNCTURE: CPT

## 2025-03-11 PROCEDURE — 84100 ASSAY OF PHOSPHORUS: CPT

## 2025-03-11 PROCEDURE — 80053 COMPREHEN METABOLIC PANEL: CPT

## 2025-03-11 PROCEDURE — 82306 VITAMIN D 25 HYDROXY: CPT

## 2025-03-11 PROCEDURE — 83735 ASSAY OF MAGNESIUM: CPT

## 2025-03-11 PROCEDURE — 85025 COMPLETE CBC W/AUTO DIFF WBC: CPT

## 2025-04-17 ENCOUNTER — TELEPHONE (OUTPATIENT)
Dept: FAMILY MEDICINE CLINIC | Age: 80
End: 2025-04-17

## 2025-04-17 DIAGNOSIS — F41.9 ANXIETY: Primary | ICD-10-CM

## 2025-04-17 RX ORDER — ALPRAZOLAM 1 MG/1
1 TABLET ORAL 3 TIMES DAILY PRN
Qty: 90 TABLET | Refills: 0 | Status: SHIPPED | OUTPATIENT
Start: 2025-04-17 | End: 2025-05-17

## 2025-04-17 NOTE — TELEPHONE ENCOUNTER
Name of Medication(s) Requested:    ALPRAZolam (XANAX) 1 MG     Medication is on current medication list Yes    Dosage and directions were verified? Yes    Quantity verified: 30 day supply     Pharmacy Verified?  Yes    [address] [phone] -  [phone]       Last Appointment:  2/28/2025    Future appts:  Future Appointments   Date Time Provider Department Center   7/8/2025  1:30 PM Layo Ha DO [place] [facility] DEP        (If no appt send self scheduling link. .REFILLAPPT)  Scheduling request sent?     [] Yes  [] No    Does patient need updated?  [] Yes  [] No

## 2025-06-02 ENCOUNTER — TELEPHONE (OUTPATIENT)
Dept: FAMILY MEDICINE CLINIC | Age: 80
End: 2025-06-02

## 2025-06-02 DIAGNOSIS — F41.9 ANXIETY: Primary | ICD-10-CM

## 2025-06-02 NOTE — TELEPHONE ENCOUNTER
Name of Medication(s) Requested:  Requested Prescriptions      No prescriptions requested or ordered in this encounter     Alprazolam 1 mg  Medication is on current medication list Yes    Dosage and directions were verified? Yes    Quantity verified: 30 day supply     Pharmacy Verified?  Yes [facility] [place]    Last Appointment:  2/28/2025    Future appts:  Future Appointments   Date Time Provider Department Center   7/8/2025  1:30 PM Layo Ha DO [place] [facility] DEP        (If no appt send self scheduling link. .REFILLAPPT)  Scheduling request sent?     [] Yes  [x] No    Does patient need updated?  [] Yes  [x] No

## 2025-06-02 NOTE — TELEPHONE ENCOUNTER
Name of Medication(s) Requested:  Requested Prescriptions     Pending Prescriptions Disp Refills    losartan (COZAAR) 50 MG tablet [Pharmacy Med Name: Losartan Potassium 50 MG Oral Tablet] 100 tablet 2     Sig: TAKE 1 TABLET BY MOUTH DAILY       Medication is on current medication list Yes    Dosage and directions were verified? Yes    Quantity verified: 90 day supply     Pharmacy Verified?  Yes    Last Appointment:  2/28/2025    Future appts:  Future Appointments   Date Time Provider Department Center   7/8/2025  1:30 PM Layo Ha DO [place] [facility] DEP        (If no appt send self scheduling link. .REFILLAPPT)  Scheduling request sent?     [] Yes  [] No    Does patient need updated?  [] Yes  [] No

## 2025-06-03 RX ORDER — LOSARTAN POTASSIUM 50 MG/1
50 TABLET ORAL DAILY
Qty: 100 TABLET | Refills: 2 | Status: SHIPPED | OUTPATIENT
Start: 2025-06-03

## 2025-06-03 RX ORDER — ALPRAZOLAM 1 MG/1
1 TABLET ORAL 3 TIMES DAILY PRN
Qty: 90 TABLET | Refills: 2 | Status: SHIPPED | OUTPATIENT
Start: 2025-06-03 | End: 2025-09-01

## 2025-06-07 ENCOUNTER — TELEPHONE (OUTPATIENT)
Dept: FAMILY MEDICINE CLINIC | Age: 80
End: 2025-06-07

## 2025-06-07 DIAGNOSIS — G89.29 CHRONIC BACK PAIN GREATER THAN 3 MONTHS DURATION: Primary | ICD-10-CM

## 2025-06-07 DIAGNOSIS — M54.9 CHRONIC BACK PAIN GREATER THAN 3 MONTHS DURATION: Primary | ICD-10-CM

## 2025-06-07 RX ORDER — MORPHINE SULFATE 30 MG/1
30 TABLET, FILM COATED, EXTENDED RELEASE ORAL 2 TIMES DAILY
Qty: 6 TABLET | Refills: 0 | Status: SHIPPED | OUTPATIENT
Start: 2025-06-07 | End: 2025-06-10

## 2025-06-07 NOTE — TELEPHONE ENCOUNTER
Patient called the on-call physician line today. She states that she picked up her morphine 30 mg BID prescription a few days ago from [facility] and misplaced it. She is asking for a refill of medication. Per her chart, this medication is prescribed by [facility] for chronic back pain. Her last refill of morphine per PDMP review was on 04/27/2025. She is also on Xanax. I refilled patient's prescription of morphine for 3 days only, and I advised her that she call her Pain Medicine physician's office on Monday for full refill. Patient voiced understanding.

## 2025-07-17 ENCOUNTER — OFFICE VISIT (OUTPATIENT)
Dept: FAMILY MEDICINE CLINIC | Age: 80
End: 2025-07-17

## 2025-07-17 VITALS
HEIGHT: 66 IN | OXYGEN SATURATION: 99 % | SYSTOLIC BLOOD PRESSURE: 130 MMHG | HEART RATE: 67 BPM | DIASTOLIC BLOOD PRESSURE: 72 MMHG | BODY MASS INDEX: 27.8 KG/M2 | WEIGHT: 173 LBS | TEMPERATURE: 97.7 F | RESPIRATION RATE: 18 BRPM

## 2025-07-17 DIAGNOSIS — F41.9 ANXIETY: ICD-10-CM

## 2025-07-17 DIAGNOSIS — I10 ESSENTIAL HYPERTENSION: Chronic | ICD-10-CM

## 2025-07-17 DIAGNOSIS — Z00.00 MEDICARE ANNUAL WELLNESS VISIT, SUBSEQUENT: Primary | ICD-10-CM

## 2025-07-17 RX ORDER — MORPHINE SULFATE 30 MG/1
30 TABLET, FILM COATED, EXTENDED RELEASE ORAL 2 TIMES DAILY
COMMUNITY
Start: 2025-07-14

## 2025-07-17 RX ORDER — AMLODIPINE BESYLATE 5 MG/1
5 TABLET ORAL DAILY
Qty: 90 TABLET | Refills: 3 | Status: SHIPPED | OUTPATIENT
Start: 2025-07-17

## 2025-07-17 RX ORDER — ALPRAZOLAM 1 MG/1
1 TABLET ORAL 3 TIMES DAILY PRN
Qty: 90 TABLET | Refills: 0 | Status: SHIPPED | OUTPATIENT
Start: 2025-07-17 | End: 2025-08-16

## 2025-07-17 ASSESSMENT — PATIENT HEALTH QUESTIONNAIRE - PHQ9
SUM OF ALL RESPONSES TO PHQ QUESTIONS 1-9: 1
SUM OF ALL RESPONSES TO PHQ QUESTIONS 1-9: 1
2. FEELING DOWN, DEPRESSED OR HOPELESS: SEVERAL DAYS
1. LITTLE INTEREST OR PLEASURE IN DOING THINGS: NOT AT ALL
SUM OF ALL RESPONSES TO PHQ QUESTIONS 1-9: 1
SUM OF ALL RESPONSES TO PHQ QUESTIONS 1-9: 1

## 2025-07-17 NOTE — PATIENT INSTRUCTIONS
problems such as diabetes, high blood pressure, and high cholesterol. If you think you may have a problem with alcohol or drug use, talk to your doctor.   Medicines    Take your medicines exactly as prescribed. Call your doctor if you think you are having a problem with your medicine.     If your doctor recommends aspirin, take the amount directed each day. Make sure you take aspirin and not another kind of pain reliever, such as acetaminophen (Tylenol).   When should you call for help?   Call 911 if you have symptoms of a heart attack. These may include:    Chest pain or pressure, or a strange feeling in the chest.     Sweating.     Shortness of breath.     Pain, pressure, or a strange feeling in the back, neck, jaw, or upper belly or in one or both shoulders or arms.     Lightheadedness or sudden weakness.     A fast or irregular heartbeat.   After you call 911, the  may tell you to chew 1 adult-strength or 2 to 4 low-dose aspirin. Wait for an ambulance. Do not try to drive yourself.  Watch closely for changes in your health, and be sure to contact your doctor if you have any problems.  Where can you learn more?  Go to https://www.[employer]net/patientEd and enter F075 to learn more about \"A Healthy Heart: Care Instructions.\"  Current as of: July 31, 2024  Content Version: 14.5  © 9020-0375 [employer].   Care instructions adapted under license by [employer]. If you have questions about a medical condition or this instruction, always ask your healthcare professional. [employer], disclaims any warranty or liability for your use of this information.    Personalized Preventive Plan for Amy Arizmendi - 7/17/2025  Medicare offers a range of preventive health benefits. Some of the tests and screenings are paid in full while other may be subject to a deductible, co-insurance, and/or copay.  Some of these benefits include a comprehensive review of your medical history including lifestyle,

## 2025-07-17 NOTE — PROGRESS NOTES
Medicare Annual Wellness Visit    Amy Arizmendi is here for Medicare AWV    Assessment & Plan   Medicare annual wellness visit, subsequent  Anxiety  -     ALPRAZolam (XANAX) 1 MG tablet; Take 1 tablet by mouth 3 times daily as needed for Anxiety for up to 30 days. Max Daily Amount: 3 mg, Disp-90 tablet, R-0Normal  Essential hypertension  -     amLODIPine (NORVASC) 5 MG tablet; Take 1 tablet by mouth daily, Disp-90 tablet, R-3Normal       Return for Medicare Annual Wellness Visit in 1 year.     Subjective       Patient's complete Health Risk Assessment and screening values have been reviewed and are found in Flowsheets. The following problems were reviewed today and where indicated follow up appointments were made and/or referrals ordered.    Positive Risk Factor Screenings with Interventions:    Fall Risk:  Do you feel unsteady or are you worried about falling? : no  2 or more falls in past year?: no  Fall with injury in past year?: (!) yes  Interventions:    Reviewed medications, home hazards, visual acuity, and co-morbidities that can increase risk for falls  Has fu with pain management, hip, shoulder specialists    Cognitive:   Clock Drawing Test (CDT): Normal  Words recalled: 1 Word Recalled     Total Score Interpretation: Abnormal Mini-Cog  Interventions:  Patient declines any further evaluation or treatment  No concerns with memory outside office.         Controlled Medication Review:    Today's Pain Level: No data recorded   Opioid Risk: (Low risk score <55) Opioid risk score: 12    Patient is low risk for opioid use disorder or overdose.    Last PDMP [place] as Reviewed:  Review User Review Instant Review Result   BA BORGES 6/7/2025  1:02 PM     Reviewed PDMP [1]     Last Controlled Substance Monitoring Documentation      Flowsheet Row Office Visit from 8/7/2024 in [facility]   Periodic Controlled Substance Monitoring No signs of potential drug abuse or diversion

## 2025-08-12 RX ORDER — CLOPIDOGREL BISULFATE 75 MG/1
75 TABLET ORAL DAILY
Qty: 100 TABLET | Refills: 3 | Status: SHIPPED | OUTPATIENT
Start: 2025-08-12

## 2025-08-13 DIAGNOSIS — I10 ESSENTIAL HYPERTENSION: Chronic | ICD-10-CM

## 2025-08-13 RX ORDER — AMLODIPINE BESYLATE 5 MG/1
5 TABLET ORAL DAILY
Qty: 90 TABLET | Refills: 3 | Status: SHIPPED | OUTPATIENT
Start: 2025-08-13

## 2025-08-26 ENCOUNTER — HOSPITAL ENCOUNTER (OUTPATIENT)
Dept: LAB | Age: 80
Discharge: HOME | End: 2025-08-26
Payer: MEDICARE

## 2025-08-26 LAB
25(OH)D3 SERPL-MCNC: 41.3 NG/ML (ref 30–100)
ALBUMIN SERPL-MCNC: 4.2 G/DL (ref 3.5–5.2)
ALP SERPL-CCNC: 98 U/L (ref 35–104)
ALT SERPL-CCNC: 8 U/L (ref 0–35)
AMORPH SED URNS QL MICRO: PRESENT
ANION GAP SERPL CALCULATED.3IONS-SCNC: 11 MMOL/L (ref 7–16)
AST SERPL-CCNC: 17 U/L (ref 0–35)
BASOPHILS # BLD: 0.03 K/UL (ref 0–0.2)
BASOPHILS NFR BLD: 1 % (ref 0–2)
BILIRUB SERPL-MCNC: 0.4 MG/DL (ref 0–1.2)
BILIRUB UR QL STRIP: ABNORMAL
BUN SERPL-MCNC: 13 MG/DL (ref 8–23)
CALCIUM SERPL-MCNC: 8.9 MG/DL (ref 8.8–10.2)
CHLORIDE SERPL-SCNC: 105 MMOL/L (ref 98–107)
CLARITY UR: CLEAR
CO2 SERPL-SCNC: 26 MMOL/L (ref 22–29)
COLOR UR: YELLOW
CREAT SERPL-MCNC: 1.5 MG/DL (ref 0.5–1)
CREAT UR-MCNC: 822 MG/DL (ref 29–226)
EOSINOPHIL # BLD: 0.16 K/UL (ref 0.05–0.5)
EOSINOPHILS RELATIVE PERCENT: 3 % (ref 0–6)
EPI CELLS #/AREA URNS HPF: ABNORMAL /HPF
ERYTHROCYTE [DISTWIDTH] IN BLOOD BY AUTOMATED COUNT: 15 % (ref 11.5–15)
GFR, ESTIMATED: 37 ML/MIN/1.73M2
GLUCOSE SERPL-MCNC: 116 MG/DL (ref 74–99)
GLUCOSE UR STRIP-MCNC: NEGATIVE MG/DL
HBA1C MFR BLD: 6.2 % (ref 4–5.6)
HCT VFR BLD AUTO: 38.1 % (ref 34–48)
HGB BLD-MCNC: 12.2 G/DL (ref 11.5–15.5)
HGB UR QL STRIP.AUTO: NEGATIVE
IMM GRANULOCYTES # BLD AUTO: <0.03 K/UL (ref 0–0.58)
IMM GRANULOCYTES NFR BLD: 0 % (ref 0–5)
KETONES UR STRIP-MCNC: NEGATIVE MG/DL
LEUKOCYTE ESTERASE UR QL STRIP: NEGATIVE
LYMPHOCYTES NFR BLD: 2.44 K/UL (ref 1.5–4)
LYMPHOCYTES RELATIVE PERCENT: 47 % (ref 20–42)
MAGNESIUM SERPL-MCNC: 2.3 MG/DL (ref 1.6–2.4)
MCH RBC QN AUTO: 25.3 PG (ref 26–35)
MCHC RBC AUTO-ENTMCNC: 32 G/DL (ref 32–34.5)
MCV RBC AUTO: 78.9 FL (ref 80–99.9)
MICROALBUMIN UR-MCNC: 82 MG/L (ref 0–20)
MICROALBUMIN/CREAT UR-RTO: 10 MCG/MG CREAT (ref 0–30)
MONOCYTES NFR BLD: 0.33 K/UL (ref 0.1–0.95)
MONOCYTES NFR BLD: 6 % (ref 2–12)
MUCOUS THREADS URNS QL MICRO: PRESENT
NEUTROPHILS NFR BLD: 42 % (ref 43–80)
NEUTS SEG NFR BLD: 2.19 K/UL (ref 1.8–7.3)
NITRITE UR QL STRIP: NEGATIVE
PH UR STRIP: 5.5 [PH] (ref 5–8)
PHOSPHATE SERPL-MCNC: 3.3 MG/DL (ref 2.5–4.5)
PLATELET # BLD AUTO: 143 K/UL (ref 130–450)
PMV BLD AUTO: 9.1 FL (ref 7–12)
POTASSIUM SERPL-SCNC: 3.9 MMOL/L (ref 3.5–5.1)
PROT SERPL-MCNC: 6.7 G/DL (ref 6.4–8.3)
PROT UR STRIP-MCNC: 30 MG/DL
PTH-INTACT SERPL-MCNC: 111 PG/ML (ref 15–65)
RBC # BLD AUTO: 4.83 M/UL (ref 3.5–5.5)
RBC #/AREA URNS HPF: ABNORMAL /HPF
SODIUM SERPL-SCNC: 142 MMOL/L (ref 136–145)
SP GR UR STRIP: >1.03 (ref 1–1.03)
URATE SERPL-MCNC: 4.6 MG/DL (ref 2.4–5.7)
UROBILINOGEN UR STRIP-ACNC: 0.2 EU/DL (ref 0–1)
WBC #/AREA URNS HPF: ABNORMAL /HPF
WBC OTHER # BLD: 5.2 K/UL (ref 4.5–11.5)

## 2025-08-26 PROCEDURE — 36415 COLL VENOUS BLD VENIPUNCTURE: CPT

## 2025-08-26 PROCEDURE — 80053 COMPREHEN METABOLIC PANEL: CPT

## 2025-08-26 PROCEDURE — 81001 URINALYSIS AUTO W/SCOPE: CPT

## 2025-08-26 PROCEDURE — 82570 ASSAY OF URINE CREATININE: CPT

## 2025-08-26 PROCEDURE — 84100 ASSAY OF PHOSPHORUS: CPT

## 2025-08-26 PROCEDURE — 83036 HEMOGLOBIN GLYCOSYLATED A1C: CPT

## 2025-08-26 PROCEDURE — 83970 ASSAY OF PARATHORMONE: CPT

## 2025-08-26 PROCEDURE — 82306 VITAMIN D 25 HYDROXY: CPT

## 2025-08-26 PROCEDURE — 84550 ASSAY OF BLOOD/URIC ACID: CPT

## 2025-08-26 PROCEDURE — 83735 ASSAY OF MAGNESIUM: CPT

## 2025-08-26 PROCEDURE — 82043 UR ALBUMIN QUANTITATIVE: CPT

## 2025-08-26 PROCEDURE — 85025 COMPLETE CBC W/AUTO DIFF WBC: CPT

## 2025-09-04 DIAGNOSIS — F41.9 ANXIETY: ICD-10-CM

## 2025-09-04 RX ORDER — ALPRAZOLAM 1 MG/1
1 TABLET ORAL 3 TIMES DAILY PRN
Qty: 90 TABLET | Refills: 0 | Status: SHIPPED | OUTPATIENT
Start: 2025-09-04 | End: 2025-10-04